# Patient Record
Sex: FEMALE | Race: WHITE | NOT HISPANIC OR LATINO
[De-identification: names, ages, dates, MRNs, and addresses within clinical notes are randomized per-mention and may not be internally consistent; named-entity substitution may affect disease eponyms.]

---

## 2017-09-03 ENCOUNTER — HEALTH MAINTENANCE LETTER (OUTPATIENT)
Age: 32
End: 2017-09-03

## 2020-08-10 ENCOUNTER — HOSPITAL ENCOUNTER (INPATIENT)
Facility: HOSPITAL | Age: 35
LOS: 8 days | Discharge: HOME | DRG: 957 | End: 2020-08-18
Attending: EMERGENCY MEDICINE | Admitting: SURGERY
Payer: COMMERCIAL

## 2020-08-10 ENCOUNTER — APPOINTMENT (OUTPATIENT)
Dept: RADIOLOGY | Facility: HOSPITAL | Age: 35
DRG: 957 | End: 2020-08-10
Payer: COMMERCIAL

## 2020-08-10 ENCOUNTER — APPOINTMENT (OUTPATIENT)
Dept: CT IMAGING | Facility: HOSPITAL | Age: 35
DRG: 957 | End: 2020-08-10
Payer: COMMERCIAL

## 2020-08-10 ENCOUNTER — HOSPITAL ENCOUNTER (OUTPATIENT)
Facility: HOSPITAL | Age: 35
Discharge: OTHER INSTITUTION | End: 2020-08-10
Payer: OTHER MISCELLANEOUS

## 2020-08-10 DIAGNOSIS — R41.82 AMS (ALTERED MENTAL STATUS): ICD-10-CM
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
## 2020-08-10 DIAGNOSIS — S27.0XXA TRAUMATIC FRACTURE OF RIBS WITH PNEUMOTHORAX, LEFT, CLOSED, INITIAL ENCOUNTER: ICD-10-CM

## 2020-08-10 DIAGNOSIS — S42.022A CLOSED DISPLACED FRACTURE OF SHAFT OF LEFT CLAVICLE, INITIAL ENCOUNTER: ICD-10-CM

## 2020-08-10 DIAGNOSIS — H53.8 BLURRED VISION, BILATERAL: ICD-10-CM

## 2020-08-10 DIAGNOSIS — S22.42XA TRAUMATIC FRACTURE OF RIBS WITH PNEUMOTHORAX, LEFT, CLOSED, INITIAL ENCOUNTER: ICD-10-CM

## 2020-08-10 DIAGNOSIS — S22.49XA MULTIPLE RIB FRACTURES: Primary | ICD-10-CM

## 2020-08-10 DIAGNOSIS — S36.112A CONTUSION OF LIVER, INITIAL ENCOUNTER: ICD-10-CM

## 2020-08-10 DIAGNOSIS — S37.019A: ICD-10-CM

## 2020-08-10 DIAGNOSIS — S09.90XA CLOSED HEAD INJURY, INITIAL ENCOUNTER: ICD-10-CM

## 2020-08-10 DIAGNOSIS — S27.329A PULMONARY CONTUSION: ICD-10-CM

## 2020-08-10 DIAGNOSIS — H53.2 DIPLOPIA: ICD-10-CM

## 2020-08-10 DIAGNOSIS — S42.101A RIGHT SCAPULA FRACTURE: ICD-10-CM

## 2020-08-10 PROBLEM — Z97.8 ENDOTRACHEAL TUBE PRESENT: Status: ACTIVE | Noted: 2020-08-10

## 2020-08-10 PROBLEM — E87.20 LACTIC ACID ACIDOSIS: Status: ACTIVE | Noted: 2020-08-10

## 2020-08-10 PROBLEM — J93.9 PNEUMOTHORAX, LEFT: Status: ACTIVE | Noted: 2020-08-10

## 2020-08-10 PROBLEM — R79.89 LFT ELEVATION: Status: ACTIVE | Noted: 2020-08-10

## 2020-08-10 PROBLEM — J96.01 ACUTE HYPOXEMIC RESPIRATORY FAILURE (CMS/HCC): Status: ACTIVE | Noted: 2020-08-10

## 2020-08-10 PROBLEM — S27.322A CONTUSION OF BOTH LUNGS: Status: ACTIVE | Noted: 2020-08-10

## 2020-08-10 LAB
ABO GROUP (TYPE) IN BLOOD: NORMAL
ALBUMIN SERPL-MCNC: 4.1 G/DL (ref 3.5–5.3)
ALP SERPL-CCNC: 86 U/L (ref 55–142)
ALT SERPL-CCNC: 720 U/L (ref 7–52)
AMPHET UR QL SCN: NORMAL
ANION GAP SERPL CALC-SCNC: 12 MMOL/L (ref 3–11)
ANTIBODY SCREEN: NORMAL
APTT PPP: 24.8 SECONDS (ref 25.1–36.5)
AST SERPL-CCNC: 1165 U/L
BACTERIA #/AREA URNS AUTO: ABNORMAL /HPF
BARBITURATES UR QL SCN: NORMAL
BASE EXCESS BLDA CALC-SCNC: -8.2 MMOL/L (ref -2–2)
BASOPHILS # BLD AUTO: 0.1 10*3/UL
BASOPHILS NFR BLD AUTO: 0 % (ref 0–2)
BENZODIAZ UR QL SCN: NORMAL
BILIRUB SERPL-MCNC: 0.45 MG/DL (ref 0.2–1.4)
BILIRUB UR QL STRIP.AUTO: NEGATIVE
BUN SERPL-MCNC: 14 MG/DL (ref 7–25)
CALCIUM ALBUM COR SERPL-MCNC: 7.9 MG/DL (ref 8.6–10.3)
CALCIUM SERPL-MCNC: 8 MG/DL (ref 8.6–10.3)
CANNABINOIDS UR QL SCN: NORMAL
CHLORIDE SERPL-SCNC: 106 MMOL/L (ref 98–107)
CLARITY UR: CLEAR
CO2 BLDA-SCNC: 16.7 MMOL/L (ref 19–24)
CO2 SERPL-SCNC: 18 MMOL/L (ref 21–32)
COCAINE UR QL SCN: NORMAL
COLOR UR: ABNORMAL
CREAT SERPL-MCNC: 0.97 MG/DL (ref 0.6–1.1)
D AG BLD QL: NORMAL
DELTA HIGH SENSITIVE TROPONIN I, LATE DRAW 2 HOUR: 5
DELTA HIGH SENSITIVITY TROPONIN I, 1 HOUR: 7.3
DEVICE (RT): ABNORMAL
EOSINOPHIL # BLD AUTO: 0.1 10*3/UL
EOSINOPHIL NFR BLD AUTO: 0 % (ref 0–3)
ERYTHROCYTE [DISTWIDTH] IN BLOOD BY AUTOMATED COUNT: 14.3 % (ref 11.5–14)
ERYTHROCYTE [DISTWIDTH] IN BLOOD BY AUTOMATED COUNT: 14.5 % (ref 11.5–14)
ETHANOL SERPL-MCNC: 32 MG/DL (ref 0–10)
FIO2: 40 %
GFR SERPL CREATININE-BSD FRML MDRD: 42 ML/MIN/1.73M*2
GLUCOSE BLDC GLUCOMTR-MCNC: 95 MG/DL (ref 70–105)
GLUCOSE SERPL-MCNC: 120 MG/DL (ref 70–105)
GLUCOSE UR STRIP.AUTO-MCNC: NEGATIVE MG/DL
HCG SERPL QL: NEGATIVE
HCO3 BLDA-SCNC: 17.9 MMOL/L (ref 23–29)
HCT VFR BLD AUTO: 35 % (ref 34–45)
HCT VFR BLD AUTO: 41.7 % (ref 34–45)
HGB BLD-MCNC: 11.9 G/DL (ref 11.5–15.5)
HGB BLD-MCNC: 13.9 G/DL (ref 11.5–15.5)
HGB UR QL STRIP.AUTO: ABNORMAL
INR BLD: 1
KETONES UR STRIP.AUTO-MCNC: NEGATIVE MG/DL
LACTATE BLDV-SCNC: 4.55 MMOL/L (ref 0.5–1.99)
LEUKOCYTE ESTERASE UR QL STRIP: NEGATIVE
LIPASE SERPL-CCNC: 27 U/L (ref 11–82)
LYMPHOCYTES # BLD AUTO: 2.1 10*3/UL
LYMPHOCYTES NFR BLD AUTO: 10 % (ref 11–47)
MCH RBC QN AUTO: 31.1 PG (ref 28–33)
MCH RBC QN AUTO: 31.7 PG (ref 28–33)
MCHC RBC AUTO-ENTMCNC: 33.3 G/DL (ref 32–36)
MCHC RBC AUTO-ENTMCNC: 34 G/DL (ref 32–36)
MCV RBC AUTO: 93.3 FL (ref 81–97)
MCV RBC AUTO: 93.5 FL (ref 81–97)
METHADONE UR QL SCN: NORMAL
METHAMPHET UR QL SCN: NORMAL
MODE (RT): ABNORMAL
MONOCYTES # BLD AUTO: 1.1 10*3/UL
MONOCYTES NFR BLD AUTO: 5 % (ref 3–11)
MV (RT): 7 L/MIN
NEUTROPHILS # BLD AUTO: 17.5 10*3/UL
NEUTROPHILS NFR BLD AUTO: 84 % (ref 41–81)
NITRITE UR QL STRIP.AUTO: NEGATIVE
OPIATES UR QL SCN: NORMAL
OXYCODONE UR QL SCN: NORMAL
PCO2 BLDA: 37.9 MMHG (ref 35–45)
PCP UR QL SCN: NORMAL
PEEP SET (RT): 8 CM/H2O
PH BLDA: 7.28 PH (ref 7.35–7.45)
PH UR STRIP.AUTO: 7 PH
PIP OBSERVED (RT): 26 CM/H2O
PLATELET # BLD AUTO: 233 10*3/UL (ref 140–350)
PLATELET # BLD AUTO: 351 10*3/UL (ref 140–350)
PMV BLD AUTO: 7.2 FL (ref 6.9–10.8)
PMV BLD AUTO: 7.2 FL (ref 6.9–10.8)
PO2 BLDA: 126 MMHG (ref 60–80)
POCT CTO2, ARTERIAL: 16.8 ML/DL (ref 15–24)
POCT HEMOGLOBIN (MEASURED), ARTERIAL: 12.3 G/DL (ref 11.5–15.5)
POCT OXYHEMOGLOBIN, ARTERIAL: 95.8 %
POTASSIUM SERPL-SCNC: 3.4 MMOL/L (ref 3.5–5.1)
PROPOXYPH UR QL SCN: NORMAL
PROT SERPL-MCNC: 6.7 G/DL (ref 6–8.3)
PROT UR STRIP.AUTO-MCNC: 100 MG/DL
PROTHROMBIN TIME: 11.2 SECONDS (ref 9.4–12.5)
RBC # BLD AUTO: 3.74 10*6/ΜL (ref 3.7–5.3)
RBC # BLD AUTO: 4.47 10*6/ΜL (ref 3.7–5.3)
RBC #/AREA URNS AUTO: ABNORMAL /HPF
RR SET (RT): 18 B/MIN
RR TOTAL (RT): 18 B/MIN
SARS-COV-2 RNA RESP QL NAA+PROBE: NEGATIVE
SECOND/CONFIRMATORY ABORH PERFORMED: NORMAL
SODIUM SERPL-SCNC: 136 MMOL/L (ref 135–145)
SP GR UR STRIP.AUTO: 1.01 (ref 1–1.03)
SPO2 (RT): 100 %
SQUAMOUS #/AREA URNS AUTO: ABNORMAL /HPF
TRICYCLICS UR QL SCN: NORMAL
TROPONIN I SERPL-MCNC: 10.1 PG/ML
TROPONIN I SERPL-MCNC: 12.4 PG/ML
TROPONIN I SERPL-MCNC: 17.4 PG/ML
UROBILINOGEN UR STRIP.AUTO-MCNC: <2 E.U./DL
VT OBSERVED (RT): 388 ML
VT SET (RT): 400 ML
WBC # BLD AUTO: 16.4 10*3/UL (ref 4.5–10.5)
WBC # BLD AUTO: 20.8 10*3/UL (ref 4.5–10.5)
WBC #/AREA URNS AUTO: ABNORMAL /HPF

## 2020-08-10 PROCEDURE — (BLANK) HC ROOM ICU SURGERY

## 2020-08-10 PROCEDURE — G0480 DRUG TEST DEF 1-7 CLASSES: HCPCS

## 2020-08-10 PROCEDURE — A0390 ADVANCED LIFE SUPPORT MILEAG: HCPCS | Mod: SI,QN

## 2020-08-10 PROCEDURE — 80053 COMPREHEN METABOLIC PANEL: CPT | Performed by: INTERNAL MEDICINE

## 2020-08-10 PROCEDURE — 5A1935Z RESPIRATORY VENTILATION, LESS THAN 24 CONSECUTIVE HOURS: ICD-10-PCS | Performed by: INTERNAL MEDICINE

## 2020-08-10 PROCEDURE — 83605 ASSAY OF LACTIC ACID: CPT

## 2020-08-10 PROCEDURE — 83690 ASSAY OF LIPASE: CPT

## 2020-08-10 PROCEDURE — 85730 THROMBOPLASTIN TIME PARTIAL: CPT

## 2020-08-10 PROCEDURE — 87635 SARS-COV-2 COVID-19 AMP PRB: CPT | Performed by: INTERNAL MEDICINE

## 2020-08-10 PROCEDURE — 6360000200 HC RX 636 W HCPCS (ALT 250 FOR IP): Performed by: EMERGENCY MEDICINE

## 2020-08-10 PROCEDURE — 85027 COMPLETE CBC AUTOMATED: CPT | Performed by: INTERNAL MEDICINE

## 2020-08-10 PROCEDURE — 2590000100 HC RX 259: Performed by: INTERNAL MEDICINE

## 2020-08-10 PROCEDURE — 83605 ASSAY OF LACTIC ACID: CPT | Performed by: INTERNAL MEDICINE

## 2020-08-10 PROCEDURE — 85610 PROTHROMBIN TIME: CPT

## 2020-08-10 PROCEDURE — 86885 COOMBS TEST INDIRECT QUAL: CPT | Performed by: EMERGENCY MEDICINE

## 2020-08-10 PROCEDURE — 99291 CRITICAL CARE FIRST HOUR: CPT | Performed by: INTERNAL MEDICINE

## 2020-08-10 PROCEDURE — 80306 DRUG TEST PRSMV INSTRMNT: CPT

## 2020-08-10 PROCEDURE — 36415 COLL VENOUS BLD VENIPUNCTURE: CPT

## 2020-08-10 PROCEDURE — 99221 1ST HOSP IP/OBS SF/LOW 40: CPT | Mod: NC | Performed by: PHYSICIAN ASSISTANT

## 2020-08-10 PROCEDURE — 2580000300 HC RX 258

## 2020-08-10 PROCEDURE — 84484 ASSAY OF TROPONIN QUANT: CPT

## 2020-08-10 PROCEDURE — 2500000200 HC RX 250 WO HCPCS: Performed by: EMERGENCY MEDICINE

## 2020-08-10 PROCEDURE — 71045 X-RAY EXAM CHEST 1 VIEW: CPT

## 2020-08-10 PROCEDURE — 94002 VENT MGMT INPAT INIT DAY: CPT

## 2020-08-10 PROCEDURE — 99291 CRITICAL CARE FIRST HOUR: CPT | Performed by: EMERGENCY MEDICINE

## 2020-08-10 PROCEDURE — 71260 CT THORAX DX C+: CPT | Mod: MG

## 2020-08-10 PROCEDURE — 6360000200 HC RX 636 W HCPCS (ALT 250 FOR IP)

## 2020-08-10 PROCEDURE — 6370000100 HC RX 637 (ALT 250 FOR IP): Performed by: NURSE PRACTITIONER

## 2020-08-10 PROCEDURE — (BLANK) HC ROOM ICU TRAUMA

## 2020-08-10 PROCEDURE — 96365 THER/PROPH/DIAG IV INF INIT: CPT

## 2020-08-10 PROCEDURE — 70450 CT HEAD/BRAIN W/O DYE: CPT | Mod: MG

## 2020-08-10 PROCEDURE — 84703 CHORIONIC GONADOTROPIN ASSAY: CPT

## 2020-08-10 PROCEDURE — 2580000300 HC RX 258: Performed by: NURSE PRACTITIONER

## 2020-08-10 PROCEDURE — 6360000200 HC RX 636 W HCPCS (ALT 250 FOR IP): Performed by: NURSE PRACTITIONER

## 2020-08-10 PROCEDURE — G0390 TRAUMA RESPONS W/HOSP CRITI: HCPCS

## 2020-08-10 PROCEDURE — 82947 ASSAY GLUCOSE BLOOD QUANT: CPT | Mod: QW

## 2020-08-10 PROCEDURE — 96375 TX/PRO/DX INJ NEW DRUG ADDON: CPT

## 2020-08-10 PROCEDURE — 80053 COMPREHEN METABOLIC PANEL: CPT

## 2020-08-10 PROCEDURE — 72125 CT NECK SPINE W/O DYE: CPT | Mod: MG

## 2020-08-10 PROCEDURE — 6360000200 HC RX 636 W HCPCS (ALT 250 FOR IP): Performed by: INTERNAL MEDICINE

## 2020-08-10 PROCEDURE — 82805 BLOOD GASES W/O2 SATURATION: CPT

## 2020-08-10 PROCEDURE — 81001 URINALYSIS AUTO W/SCOPE: CPT

## 2020-08-10 PROCEDURE — 73030 X-RAY EXAM OF SHOULDER: CPT | Mod: RT

## 2020-08-10 PROCEDURE — 2550000100 HC RX 255: Mod: JW | Performed by: EMERGENCY MEDICINE

## 2020-08-10 PROCEDURE — C9803 HOPD COVID-19 SPEC COLLECT: HCPCS | Performed by: INTERNAL MEDICINE

## 2020-08-10 PROCEDURE — 36600 WITHDRAWAL OF ARTERIAL BLOOD: CPT

## 2020-08-10 PROCEDURE — 85610 PROTHROMBIN TIME: CPT | Performed by: INTERNAL MEDICINE

## 2020-08-10 PROCEDURE — 99221 1ST HOSP IP/OBS SF/LOW 40: CPT | Performed by: SURGERY

## 2020-08-10 PROCEDURE — 85025 COMPLETE CBC W/AUTO DIFF WBC: CPT

## 2020-08-10 PROCEDURE — A0427 ALS1-EMERGENCY: HCPCS | Mod: SI,QN

## 2020-08-10 PROCEDURE — 80320 DRUG SCREEN QUANTALCOHOLS: CPT

## 2020-08-10 PROCEDURE — 99291 CRITICAL CARE FIRST HOUR: CPT

## 2020-08-10 PROCEDURE — 6370000100 HC RX 637 (ALT 250 FOR IP): Performed by: INTERNAL MEDICINE

## 2020-08-10 RX ORDER — LIDOCAINE 560 MG/1
1 PATCH PERCUTANEOUS; TOPICAL; TRANSDERMAL DAILY
Status: DISCONTINUED | OUTPATIENT
Start: 2020-08-10 | End: 2020-08-18 | Stop reason: HOSPADM

## 2020-08-10 RX ORDER — SODIUM CHLORIDE, SODIUM LACTATE, POTASSIUM CHLORIDE, CALCIUM CHLORIDE 600; 310; 30; 20 MG/100ML; MG/100ML; MG/100ML; MG/100ML
200 INJECTION, SOLUTION INTRAVENOUS CONTINUOUS
Status: DISCONTINUED | OUTPATIENT
Start: 2020-08-10 | End: 2020-08-11

## 2020-08-10 RX ORDER — DOCUSATE SODIUM 50 MG/5ML
100 LIQUID ORAL DAILY
Status: DISCONTINUED | OUTPATIENT
Start: 2020-08-10 | End: 2020-08-11

## 2020-08-10 RX ORDER — POTASSIUM CHLORIDE 7.45 MG/ML
10 INJECTION INTRAVENOUS ONCE
Status: COMPLETED | OUTPATIENT
Start: 2020-08-10 | End: 2020-08-11

## 2020-08-10 RX ORDER — POTASSIUM CHLORIDE 7.45 MG/ML
10 INJECTION INTRAVENOUS ONCE
Status: DISCONTINUED | OUTPATIENT
Start: 2020-08-11 | End: 2020-08-11

## 2020-08-10 RX ORDER — SODIUM CHLORIDE 9 MG/ML
25-50 INJECTION, SOLUTION INTRAVENOUS AS NEEDED
Status: DISCONTINUED | OUTPATIENT
Start: 2020-08-10 | End: 2020-08-18 | Stop reason: HOSPADM

## 2020-08-10 RX ORDER — FENTANYL CITRATE/PF 50 MCG/ML
100 PLASTIC BAG, INJECTION (ML) INTRAVENOUS
Status: DISCONTINUED | OUTPATIENT
Start: 2020-08-10 | End: 2020-08-11

## 2020-08-10 RX ORDER — CYCLOBENZAPRINE HCL 10 MG
10 TABLET ORAL 3 TIMES DAILY PRN
Status: DISCONTINUED | OUTPATIENT
Start: 2020-08-10 | End: 2020-08-11

## 2020-08-10 RX ORDER — ENOXAPARIN SODIUM 100 MG/ML
40 INJECTION SUBCUTANEOUS
Status: DISCONTINUED | OUTPATIENT
Start: 2020-08-10 | End: 2020-08-18 | Stop reason: HOSPADM

## 2020-08-10 RX ORDER — PROPOFOL 10 MG/ML
INJECTION, EMULSION INTRAVENOUS
Status: COMPLETED
Start: 2020-08-10 | End: 2020-08-10

## 2020-08-10 RX ORDER — HYDROMORPHONE HYDROCHLORIDE 1 MG/ML
.4-.6 INJECTION, SOLUTION INTRAMUSCULAR; INTRAVENOUS; SUBCUTANEOUS
Status: DISCONTINUED | OUTPATIENT
Start: 2020-08-10 | End: 2020-08-11

## 2020-08-10 RX ORDER — ONDANSETRON 4 MG/1
4 TABLET, FILM COATED ORAL EVERY 6 HOURS PRN
Status: DISCONTINUED | OUTPATIENT
Start: 2020-08-10 | End: 2020-08-18 | Stop reason: HOSPADM

## 2020-08-10 RX ORDER — POTASSIUM CHLORIDE 7.45 MG/ML
10 INJECTION INTRAVENOUS ONCE
Status: COMPLETED | OUTPATIENT
Start: 2020-08-11 | End: 2020-08-11

## 2020-08-10 RX ORDER — KETAMINE HYDROCHLORIDE 10 MG/ML
70 INJECTION, SOLUTION INTRAMUSCULAR; INTRAVENOUS ONCE
Status: DISCONTINUED | OUTPATIENT
Start: 2020-08-10 | End: 2020-08-10 | Stop reason: SDUPTHER

## 2020-08-10 RX ORDER — SODIUM CHLORIDE, SODIUM LACTATE, POTASSIUM CHLORIDE, AND CALCIUM CHLORIDE .6; .31; .03; .02 G/100ML; G/100ML; G/100ML; G/100ML
1000 INJECTION, SOLUTION INTRAVENOUS ONCE
Status: COMPLETED | OUTPATIENT
Start: 2020-08-10 | End: 2020-08-10

## 2020-08-10 RX ORDER — ACETAMINOPHEN 325 MG/1
650 TABLET ORAL EVERY 6 HOURS SCHEDULED
Status: DISCONTINUED | OUTPATIENT
Start: 2020-08-10 | End: 2020-08-10

## 2020-08-10 RX ORDER — DOCUSATE SODIUM 100 MG/1
100 CAPSULE, LIQUID FILLED ORAL DAILY
Status: DISCONTINUED | OUTPATIENT
Start: 2020-08-10 | End: 2020-08-10

## 2020-08-10 RX ORDER — SODIUM CHLORIDE 0.9 % (FLUSH) 0.9 %
3 SYRINGE (ML) INJECTION AS NEEDED
Status: DISCONTINUED | OUTPATIENT
Start: 2020-08-10 | End: 2020-08-18 | Stop reason: HOSPADM

## 2020-08-10 RX ORDER — ACETAMINOPHEN 650 MG/20.3ML
650 LIQUID ORAL EVERY 6 HOURS SCHEDULED
Status: DISCONTINUED | OUTPATIENT
Start: 2020-08-10 | End: 2020-08-11

## 2020-08-10 RX ORDER — KETAMINE HCL IN 0.9 % NACL 50 MG/5 ML
70 SYRINGE (ML) INTRAVENOUS ONCE
Status: COMPLETED | OUTPATIENT
Start: 2020-08-10 | End: 2020-08-10

## 2020-08-10 RX ORDER — SODIUM CHLORIDE AND POTASSIUM CHLORIDE 150; 900 MG/100ML; MG/100ML
100 INJECTION, SOLUTION INTRAVENOUS CONTINUOUS
Status: DISCONTINUED | OUTPATIENT
Start: 2020-08-10 | End: 2020-08-10

## 2020-08-10 RX ORDER — IOPAMIDOL 755 MG/ML
120 INJECTION, SOLUTION INTRAVASCULAR ONCE
Status: COMPLETED | OUTPATIENT
Start: 2020-08-10 | End: 2020-08-10

## 2020-08-10 RX ORDER — ONDANSETRON HYDROCHLORIDE 2 MG/ML
4 INJECTION, SOLUTION INTRAVENOUS EVERY 6 HOURS PRN
Status: DISCONTINUED | OUTPATIENT
Start: 2020-08-10 | End: 2020-08-18 | Stop reason: HOSPADM

## 2020-08-10 RX ORDER — PROPOFOL 10 MG/ML
5-50 INJECTION, EMULSION INTRAVENOUS
Status: DISCONTINUED | OUTPATIENT
Start: 2020-08-10 | End: 2020-08-11

## 2020-08-10 RX ORDER — POTASSIUM CHLORIDE 7.45 MG/ML
10 INJECTION INTRAVENOUS ONCE
Status: COMPLETED | OUTPATIENT
Start: 2020-08-10 | End: 2020-08-10

## 2020-08-10 RX ADMIN — PROPOFOL 50 MCG/KG/MIN: 10 INJECTION, EMULSION INTRAVENOUS at 21:50

## 2020-08-10 RX ADMIN — FENTANYL CITRATE 100 MCG: 50 INJECTION, SOLUTION INTRAMUSCULAR; INTRAVENOUS at 17:46

## 2020-08-10 RX ADMIN — SODIUM CHLORIDE, POTASSIUM CHLORIDE, SODIUM LACTATE AND CALCIUM CHLORIDE 1000 ML: 600; 310; 30; 20 INJECTION, SOLUTION INTRAVENOUS at 17:45

## 2020-08-10 RX ADMIN — PROPOFOL 20 MCG/KG/MIN: 10 INJECTION, EMULSION INTRAVENOUS at 18:30

## 2020-08-10 RX ADMIN — FENTANYL CITRATE 100 MCG: 50 INJECTION, SOLUTION INTRAMUSCULAR; INTRAVENOUS at 19:17

## 2020-08-10 RX ADMIN — FENTANYL CITRATE 100 MCG: 50 INJECTION, SOLUTION INTRAMUSCULAR; INTRAVENOUS at 21:06

## 2020-08-10 RX ADMIN — ONDANSETRON 4 MG: 2 INJECTION INTRAMUSCULAR; INTRAVENOUS at 22:23

## 2020-08-10 RX ADMIN — ENOXAPARIN SODIUM 40 MG: 40 INJECTION SUBCUTANEOUS at 21:53

## 2020-08-10 RX ADMIN — LIDOCAINE 1 PATCH: 560 PATCH PERCUTANEOUS; TOPICAL; TRANSDERMAL at 21:06

## 2020-08-10 RX ADMIN — POTASSIUM CHLORIDE AND SODIUM CHLORIDE 100 ML/HR: 900; 150 INJECTION, SOLUTION INTRAVENOUS at 20:15

## 2020-08-10 RX ADMIN — POTASSIUM CHLORIDE 10 MEQ: 7.46 INJECTION, SOLUTION INTRAVENOUS at 23:01

## 2020-08-10 RX ADMIN — HYDROMORPHONE HYDROCHLORIDE 0.5 MG: 1 INJECTION, SOLUTION INTRAMUSCULAR; INTRAVENOUS; SUBCUTANEOUS at 22:23

## 2020-08-10 RX ADMIN — Medication 70 MG: at 17:47

## 2020-08-10 RX ADMIN — ACETAMINOPHEN 650 MG: 650 LIQUID ORAL at 21:08

## 2020-08-10 RX ADMIN — IOPAMIDOL 120 ML: 755 INJECTION, SOLUTION INTRAVENOUS at 18:25

## 2020-08-10 RX ADMIN — DOCUSATE SODIUM 100 MG: 50 LIQUID ORAL at 21:08

## 2020-08-10 RX ADMIN — SODIUM CHLORIDE, POTASSIUM CHLORIDE, SODIUM LACTATE AND CALCIUM CHLORIDE 200 ML/HR: 600; 310; 30; 20 INJECTION, SOLUTION INTRAVENOUS at 21:49

## 2020-08-10 RX ADMIN — CYCLOBENZAPRINE 10 MG: 10 TABLET, FILM COATED ORAL at 21:53

## 2020-08-10 RX ADMIN — PROPOFOL 50 MCG/KG/MIN: 10 INJECTION, EMULSION INTRAVENOUS at 22:24

## 2020-08-10 RX ADMIN — POTASSIUM BICARBONATE 20 MEQ: 782 TABLET, EFFERVESCENT ORAL at 21:53
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
## 2020-08-11 ENCOUNTER — APPOINTMENT (OUTPATIENT)
Dept: RADIOLOGY | Facility: HOSPITAL | Age: 35
DRG: 957 | End: 2020-08-11
Payer: COMMERCIAL

## 2020-08-11 PROBLEM — S22.43XA MULTIPLE FRACTURES OF RIBS, BILATERAL, INITIAL ENCOUNTER FOR CLOSED FRACTURE: Status: ACTIVE | Noted: 2020-08-11

## 2020-08-11 PROBLEM — S37.019A RENAL CONTUSION: Status: ACTIVE | Noted: 2020-08-11

## 2020-08-11 PROBLEM — D72.829 LEUKOCYTOSIS: Status: ACTIVE | Noted: 2020-08-11

## 2020-08-11 PROBLEM — S42.101A RIGHT SCAPULA FRACTURE: Status: ACTIVE | Noted: 2020-08-11

## 2020-08-11 PROBLEM — S42.002A CLOSED FRACTURE OF LEFT CLAVICLE: Status: ACTIVE | Noted: 2020-08-11

## 2020-08-11 PROBLEM — R73.9 HYPERGLYCEMIA: Status: ACTIVE | Noted: 2020-08-11

## 2020-08-11 PROBLEM — E83.51 HYPOCALCEMIA: Status: ACTIVE | Noted: 2020-08-11

## 2020-08-11 PROBLEM — S00.03XA SCALP HEMATOMA: Status: ACTIVE | Noted: 2020-08-11

## 2020-08-11 LAB
ALBUMIN SERPL-MCNC: 3.1 G/DL (ref 3.5–5.3)
ALBUMIN SERPL-MCNC: 3.4 G/DL (ref 3.5–5.3)
ALP SERPL-CCNC: 60 U/L (ref 55–142)
ALP SERPL-CCNC: 66 U/L (ref 55–142)
ALT SERPL-CCNC: 453 U/L (ref 7–52)
ALT SERPL-CCNC: 528 U/L (ref 7–52)
ANION GAP SERPL CALC-SCNC: 10 MMOL/L (ref 3–11)
ANION GAP SERPL CALC-SCNC: 11 MMOL/L (ref 3–11)
AST SERPL-CCNC: 656 U/L
AST SERPL-CCNC: 902 U/L
BASOPHILS # BLD AUTO: 0.1 10*3/UL
BASOPHILS NFR BLD AUTO: 0 % (ref 0–2)
BILIRUB SERPL-MCNC: 0.35 MG/DL (ref 0.2–1.4)
BILIRUB SERPL-MCNC: 0.41 MG/DL (ref 0.2–1.4)
BUN SERPL-MCNC: 13 MG/DL (ref 7–25)
BUN SERPL-MCNC: 14 MG/DL (ref 7–25)
CALCIUM ALBUM COR SERPL-MCNC: 8.1 MG/DL (ref 8.6–10.3)
CALCIUM ALBUM COR SERPL-MCNC: 8.2 MG/DL (ref 8.6–10.3)
CALCIUM SERPL-MCNC: 7.4 MG/DL (ref 8.6–10.3)
CALCIUM SERPL-MCNC: 7.7 MG/DL (ref 8.6–10.3)
CHLORIDE SERPL-SCNC: 105 MMOL/L (ref 98–107)
CHLORIDE SERPL-SCNC: 106 MMOL/L (ref 98–107)
CO2 SERPL-SCNC: 21 MMOL/L (ref 21–32)
CO2 SERPL-SCNC: 22 MMOL/L (ref 21–32)
CREAT SERPL-MCNC: 0.79 MG/DL (ref 0.6–1.1)
CREAT SERPL-MCNC: 0.93 MG/DL (ref 0.6–1.1)
EOSINOPHIL # BLD AUTO: 0 10*3/UL
EOSINOPHIL NFR BLD AUTO: 0 % (ref 0–3)
ERYTHROCYTE [DISTWIDTH] IN BLOOD BY AUTOMATED COUNT: 13.9 % (ref 11.5–14)
GFR SERPL CREATININE-BSD FRML MDRD: 44 ML/MIN/1.73M*2
GFR SERPL CREATININE-BSD FRML MDRD: 53 ML/MIN/1.73M*2
GLUCOSE BLDC GLUCOMTR-MCNC: 109 MG/DL (ref 70–105)
GLUCOSE BLDC GLUCOMTR-MCNC: 115 MG/DL (ref 70–105)
GLUCOSE BLDC GLUCOMTR-MCNC: 124 MG/DL (ref 70–105)
GLUCOSE BLDC GLUCOMTR-MCNC: 136 MG/DL (ref 70–105)
GLUCOSE SERPL-MCNC: 120 MG/DL (ref 70–105)
GLUCOSE SERPL-MCNC: 126 MG/DL (ref 70–105)
HCT VFR BLD AUTO: 31.3 % (ref 34–45)
HGB BLD-MCNC: 10.7 G/DL (ref 11.5–15.5)
INR BLD: 1.1
LACTATE SERPL-SCNC: 2.7 MMOL/L (ref 0.5–2.2)
LYMPHOCYTES # BLD AUTO: 0.6 10*3/UL
LYMPHOCYTES NFR BLD AUTO: 4 % (ref 11–47)
MCH RBC QN AUTO: 31.5 PG (ref 28–33)
MCHC RBC AUTO-ENTMCNC: 34.2 G/DL (ref 32–36)
MCV RBC AUTO: 92.1 FL (ref 81–97)
MONOCYTES # BLD AUTO: 1.3 10*3/UL
MONOCYTES NFR BLD AUTO: 9 % (ref 3–11)
NEUTROPHILS # BLD AUTO: 12.8 10*3/UL
NEUTROPHILS NFR BLD AUTO: 87 % (ref 41–81)
PLATELET # BLD AUTO: 227 10*3/UL (ref 140–350)
PMV BLD AUTO: 7.1 FL (ref 6.9–10.8)
POTASSIUM SERPL-SCNC: 4.1 MMOL/L (ref 3.5–5.1)
POTASSIUM SERPL-SCNC: 4.5 MMOL/L (ref 3.5–5.1)
PROT SERPL-MCNC: 4.9 G/DL (ref 6–8.3)
PROT SERPL-MCNC: 5.4 G/DL (ref 6–8.3)
PROTHROMBIN TIME: 12.3 SECONDS (ref 9.4–12.5)
RBC # BLD AUTO: 3.4 10*6/ΜL (ref 3.7–5.3)
SODIUM SERPL-SCNC: 136 MMOL/L (ref 135–145)
SODIUM SERPL-SCNC: 139 MMOL/L (ref 135–145)
WBC # BLD AUTO: 14.8 10*3/UL (ref 4.5–10.5)

## 2020-08-11 PROCEDURE — 2580000300 HC RX 258

## 2020-08-11 PROCEDURE — 1110000100 HC ROOM PRIVATE W TELE

## 2020-08-11 PROCEDURE — 97162 PT EVAL MOD COMPLEX 30 MIN: CPT | Mod: GP

## 2020-08-11 PROCEDURE — 82947 ASSAY GLUCOSE BLOOD QUANT: CPT | Mod: QW

## 2020-08-11 PROCEDURE — 99233 SBSQ HOSP IP/OBS HIGH 50: CPT | Mod: GC | Performed by: STUDENT IN AN ORGANIZED HEALTH CARE EDUCATION/TRAINING PROGRAM

## 2020-08-11 PROCEDURE — 94799 UNLISTED PULMONARY SVC/PX: CPT

## 2020-08-11 PROCEDURE — 97530 THERAPEUTIC ACTIVITIES: CPT | Mod: GO | Performed by: OCCUPATIONAL THERAPIST

## 2020-08-11 PROCEDURE — 94003 VENT MGMT INPAT SUBQ DAY: CPT

## 2020-08-11 PROCEDURE — 6360000200 HC RX 636 W HCPCS (ALT 250 FOR IP): Performed by: EMERGENCY MEDICINE

## 2020-08-11 PROCEDURE — 6370000100 HC RX 637 (ALT 250 FOR IP): Performed by: NURSE PRACTITIONER

## 2020-08-11 PROCEDURE — 71045 X-RAY EXAM CHEST 1 VIEW: CPT

## 2020-08-11 PROCEDURE — 6360000200 HC RX 636 W HCPCS (ALT 250 FOR IP): Performed by: NURSE PRACTITIONER

## 2020-08-11 PROCEDURE — 2590000100 HC RX 259: Performed by: NURSE PRACTITIONER

## 2020-08-11 PROCEDURE — 80053 COMPREHEN METABOLIC PANEL: CPT | Performed by: NURSE PRACTITIONER

## 2020-08-11 PROCEDURE — 97166 OT EVAL MOD COMPLEX 45 MIN: CPT | Mod: GO | Performed by: OCCUPATIONAL THERAPIST

## 2020-08-11 PROCEDURE — 99222 1ST HOSP IP/OBS MODERATE 55: CPT | Mod: 57 | Performed by: PHYSICIAN ASSISTANT

## 2020-08-11 PROCEDURE — 6360000200 HC RX 636 W HCPCS (ALT 250 FOR IP)

## 2020-08-11 PROCEDURE — 85025 COMPLETE CBC W/AUTO DIFF WBC: CPT | Performed by: NURSE PRACTITIONER

## 2020-08-11 PROCEDURE — 2590000100 HC RX 259: Performed by: INTERNAL MEDICINE

## 2020-08-11 PROCEDURE — 97530 THERAPEUTIC ACTIVITIES: CPT | Mod: GP

## 2020-08-11 PROCEDURE — 6370000100 HC RX 637 (ALT 250 FOR IP): Performed by: INTERNAL MEDICINE

## 2020-08-11 PROCEDURE — 99232 SBSQ HOSP IP/OBS MODERATE 35: CPT | Performed by: NURSE PRACTITIONER

## 2020-08-11 PROCEDURE — 6360000200 HC RX 636 W HCPCS (ALT 250 FOR IP): Performed by: INTERNAL MEDICINE

## 2020-08-11 PROCEDURE — 36415 COLL VENOUS BLD VENIPUNCTURE: CPT | Performed by: NURSE PRACTITIONER

## 2020-08-11 PROCEDURE — 73120 X-RAY EXAM OF HAND: CPT | Mod: RT

## 2020-08-11 RX ORDER — HYDROCODONE BITARTRATE AND ACETAMINOPHEN 5; 325 MG/1; MG/1
1 TABLET ORAL EVERY 6 HOURS PRN
Status: DISCONTINUED | OUTPATIENT
Start: 2020-08-11 | End: 2020-08-11

## 2020-08-11 RX ORDER — PROPOFOL 10 MG/ML
INJECTION, EMULSION INTRAVENOUS
Status: COMPLETED
Start: 2020-08-11 | End: 2020-08-11

## 2020-08-11 RX ORDER — FENTANYL CITRATE/PF 50 MCG/ML
50 PLASTIC BAG, INJECTION (ML) INTRAVENOUS
Status: DISCONTINUED | OUTPATIENT
Start: 2020-08-11 | End: 2020-08-14

## 2020-08-11 RX ORDER — DOCUSATE SODIUM 100 MG/1
200 CAPSULE, LIQUID FILLED ORAL 2 TIMES DAILY
Status: DISCONTINUED | OUTPATIENT
Start: 2020-08-11 | End: 2020-08-11

## 2020-08-11 RX ORDER — SENNOSIDES 8.6 MG/1
8.6 TABLET ORAL 2 TIMES DAILY
Status: DISCONTINUED | OUTPATIENT
Start: 2020-08-11 | End: 2020-08-18

## 2020-08-11 RX ORDER — POLYETHYLENE GLYCOL (3350) 17 G/17G
17 POWDER, FOR SOLUTION ORAL DAILY
Status: DISCONTINUED | OUTPATIENT
Start: 2020-08-11 | End: 2020-08-18 | Stop reason: HOSPADM

## 2020-08-11 RX ORDER — GABAPENTIN 300 MG/1
300 CAPSULE ORAL 3 TIMES DAILY
Status: DISCONTINUED | OUTPATIENT
Start: 2020-08-11 | End: 2020-08-18

## 2020-08-11 RX ORDER — ACETAMINOPHEN 325 MG/1
650 TABLET ORAL EVERY 6 HOURS SCHEDULED
Status: DISCONTINUED | OUTPATIENT
Start: 2020-08-11 | End: 2020-08-18 | Stop reason: HOSPADM

## 2020-08-11 RX ORDER — OXYCODONE HYDROCHLORIDE 5 MG/1
5-10 TABLET ORAL EVERY 4 HOURS PRN
Status: DISCONTINUED | OUTPATIENT
Start: 2020-08-11 | End: 2020-08-17

## 2020-08-11 RX ADMIN — POTASSIUM CHLORIDE 10 MEQ: 7.46 INJECTION, SOLUTION INTRAVENOUS at 01:26

## 2020-08-11 RX ADMIN — LIDOCAINE 1 PATCH: 560 PATCH PERCUTANEOUS; TOPICAL; TRANSDERMAL at 08:00

## 2020-08-11 RX ADMIN — FENTANYL CITRATE 100 MCG: 50 INJECTION, SOLUTION INTRAMUSCULAR; INTRAVENOUS at 07:45

## 2020-08-11 RX ADMIN — POLYETHYLENE GLYCOL 3350 17 G: 17 POWDER, FOR SOLUTION ORAL at 14:25

## 2020-08-11 RX ADMIN — POTASSIUM CHLORIDE 10 MEQ: 7.46 INJECTION, SOLUTION INTRAVENOUS at 00:21

## 2020-08-11 RX ADMIN — GABAPENTIN 300 MG: 300 CAPSULE ORAL at 14:25

## 2020-08-11 RX ADMIN — Medication 8.6 MG: at 21:07

## 2020-08-11 RX ADMIN — Medication 8.6 MG: at 08:09

## 2020-08-11 RX ADMIN — DOCUSATE SODIUM 200 MG: 100 CAPSULE, LIQUID FILLED ORAL at 09:45

## 2020-08-11 RX ADMIN — ACETAMINOPHEN 650 MG: 325 TABLET ORAL at 17:02

## 2020-08-11 RX ADMIN — OXYCODONE HYDROCHLORIDE 10 MG: 5 TABLET ORAL at 21:24

## 2020-08-11 RX ADMIN — PROPOFOL 50 MCG/KG/MIN: 10 INJECTION, EMULSION INTRAVENOUS at 00:22

## 2020-08-11 RX ADMIN — GABAPENTIN 300 MG: 300 CAPSULE ORAL at 21:07

## 2020-08-11 RX ADMIN — GABAPENTIN 300 MG: 300 CAPSULE ORAL at 08:09

## 2020-08-11 RX ADMIN — FENTANYL CITRATE 50 MCG: 50 INJECTION, SOLUTION INTRAMUSCULAR; INTRAVENOUS at 04:54

## 2020-08-11 RX ADMIN — OXYCODONE HYDROCHLORIDE 5 MG: 5 TABLET ORAL at 10:04

## 2020-08-11 RX ADMIN — SODIUM CHLORIDE, POTASSIUM CHLORIDE, SODIUM LACTATE AND CALCIUM CHLORIDE 200 ML/HR: 600; 310; 30; 20 INJECTION, SOLUTION INTRAVENOUS at 02:52

## 2020-08-11 RX ADMIN — HYDROMORPHONE HYDROCHLORIDE 0.5 MG: 1 INJECTION, SOLUTION INTRAMUSCULAR; INTRAVENOUS; SUBCUTANEOUS at 01:27
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
## 2020-08-11 NOTE — PLAN OF CARE
Problem: Infection Control  Goal: MINIMIZE THE ACQUISITION AND TRANSMISSION OF INFECTIOUS AGENTS  Description: INTERVENTIONS:  1. Isolate patient with suspected/diagnosed communicable disease  2. Place on designated isolation precautions  3. Maintain isolation techniques  4. Perform hand hygiene before and after each patient care activity  5. [place] universal precautions  6. Wear PPE as directed for type of isolation  7. Administer antibiotic therapy as ordered  8. Clean the environment appropriately after each patient use  9. Clean patient care equipment after each patient use as it leaves the room  10. Limit number of visitors, as appropriate  Outcome: Progressing     Problem: Knowledge Deficit  Goal: Patient/family/caregiver demonstrates understanding of disease process, treatment plan, medications, and discharge instructions  Description: INTERVENTIONS:   1. Complete learning assessment and assess knowledge base  2. Provide teaching at level of understanding   3. Provide teaching via preferred learning methods  Outcome: Progressing     Problem: Potential for Compromised Skin Integrity  Goal: Skin Integrity is Maintained or Improved  Description: INTERVENTIONS:  1. Assess and monitor skin integrity  2. Collaborate with interdisciplinary team and initiate plans and interventions as needed  3. Alternate a full bath with partial baths for elderly   4. Monitor patient's hygiene practices   5. Collaborate with wound, ostomy, and continence nurse  Outcome: Progressing  Goal: Nutritional status is improving  Description: INTERVENTIONS:  1. Monitor and assess patient for malnutrition (ex- brittle hair, bruises, dry skin, pale skin and conjunctiva, muscle wasting, smooth red tongue, and disorientation)  2. Monitor patient's weight and dietary intake as ordered or per policy  3. Determine patient's food preferences and provide high-protein, high-caloric foods as appropriate  4. Assist patient with eating   5. Allow  adequate time for meals   6. Encourage patient to take dietary supplement as ordered   7. Collaborate with dietitian  8. Include patient/family/caregiver in decisions related to nutrition  Outcome: Progressing  Goal: MOBILITY IS MAINTAINED OR IMPROVED  Description: INTERVENTIONS  1. Collaborate with interdisciplinary team and initiate plan and interventions as ordered (PT/OT)  2. Encourage ambulation  3. Up to chair for meals  4. Monitor for signs of deconditioning  Outcome: Progressing     Problem: Urinary Incontinence  Goal: Perineal skin integrity is maintained or improved  Description: INTERVENTIONS:  1. Assess genitourinary system, perineal skin, labs (urinalysis), and history of incontinence to include past management, aggravating, and alleviating factors  2. Collaborate with interdisciplinary team including wound, ostomy, and continence nurse and initiate plans and interventions as needed  4. Consider urine containment device  5. Apply skin protectant   6. Develop skin care regimen  7. Provide privacy when changing patient's incontinence device to maintain their dignity  Outcome: Progressing     Problem: Neurosensory - Adult  Goal: Achieves stable or improved neurological status  Description: INTERVENTIONS  1. Assess for and report changes in neurological status  2. Initiate measures to prevent increased intracranial pressure  3. Maintain blood pressure and fluid volume within ordered parameters to optimize cerebral perfusion and minimize risk of hemorrhage  4. Monitor temperature, glucose, and sodium. Initiate appropriate interventions as ordered  Outcome: Progressing  Goal: Absence of seizures  Description: INTERVENTIONS  1. Monitor for seizure activity  2. Administer anti-seizure medications as ordered  3. Monitor neurological status  4. Assist patient in avoiding triggers, if identified  Outcome: Progressing  Goal: Remains free of injury related to seizures activity  Description: INTERVENTIONS  1.  Maintain airway, patient safety  and administer oxygen as ordered  2. Monitor patient for seizure activity, document and report duration and description of seizure to provider  3. If seizure occurs, turn patient to side and suction secretions as needed  4. Reorient patient post seizure  5. Seizure pads on all 4 side rails  6. Instruct patient/family to notify RN of any seizure activity  7. Instruct patient/family to call for assistance with activity based on assessment  Outcome: Progressing  Goal: Achieves maximal functionality and self care  Description: INTERVENTIONS  1. Monitor swallowing and airway patency with patient fatigue and changes in neurological status  2. Encourage and assist patient to increase activity and self care with guidance from PT/OT  3. Encourage visually impaired, hearing impaired and aphasic patients to use assistive/communication devices  Outcome: Progressing     Problem: Cardiovascular - Adult  Goal: Maintains optimal cardiac output and hemodynamic stability  Description: INTERVENTIONS:  1. Monitor vital signs and rhythm  2. Monitor for hypotension and other signs of decreased cardiac output  3. Administer and titrate ordered vasoactive medications to optimize hemodynamic stability  4. Monitor for fluid overload/dehydration, weight gain, shortness of breath and activity intolerance  5. Monitor arterial and/or venous puncture sites for bleeding and/or hematoma  6. Assess quality of pulses, capillary refill, edema, sensation, skin color and temperature  7. Assess for signs of decreased coronary artery perfusion - ex. angina  Outcome: Progressing  Goal: Absence of cardiac dysrhythmias or at baseline  Description: INTERVENTIONS:  1. Continuous cardiac monitoring, monitor vital signs, obtain 12 lead EKG if indicated  2. Administer antiarrhythmic and heart rate control medications as ordered  3. Initiate emergency measures for life threatening arrhythmias  4. Monitor electrolytes and administer  replacement therapy as ordered  Outcome: Progressing     Problem: Respiratory - Adult  Goal: Achieves optimal ventilation and oxygenation  Description: INTERVENTIONS:  1. Assess for changes in respiratory status  2. Assess for changes in mentation and behavior  3. Position to facilitate oxygenation and minimize respiratory effort  4. Oxygen supplementation based on oxygen saturation or ABGs  5. Assess patient's ability to cough effectively  6. Encourage broncho-pulmonary hygiene including cough, deep breathe  7. Assess the need for suctioning   8. Assess and instruct to report SOB or any respiratory difficulty  9. Respiratory Therapy support as indicated, including medications and treatment.  Outcome: Progressing  Goal: Achieves optimal ventilation and oxygenation with noninvasive CPAP/BiLEVEL support  Description: INTERVENTIONS:  1. Provide education to patient/family about rationale and expected outcomes associated with therapy  2. Position patient to facilitate optimal ventilation/oxygenation status and minimize respiratory effort  3. Position patient to reduce aspiration risk, elevate head of bed at least 35 degrees or higher, as applicable  4. Assess effectiveness of therapy on ventilation/oxygenation status based on oxygen saturation and/or arterial blood gases, as indicated  5. Assess patient for changes in respiratory and physiological status  6. Auscultate breath sounds and assess chest excursion, as indicated  7. Assess patient for changes in mentation and behavior  8. Routinely monitor equipment for proper performance and settings  9. Assess and monitor skin condition, in relationship to the respiratory interface  10. Assure equipment alarm volume is adequate for the patient's environment  11. Immediately respond to equipment alarm, to assess patient and/or cause for alarm  12. Follow universal infection control/hospital policy(ies)/standards  Outcome: Progressing     Problem: Gastrointestinal -  Adult  Goal: Minimal or absence of nausea and vomiting  Description: INTERVENTIONS:  1. Ensure adequate hydration  2. Monitor intake and output  3. Maintain NPO status until nausea and vomiting are resolved  4. Nasogastric tube to low intermittent suction as ordered  5. Administer ordered antiemetic medications as needed  6. Provide nonpharmacologic comfort measures as appropriate  7. Advance diet as ordered  8. Nutrition consult as indicated   Outcome: Progressing  Goal: Maintains or returns to baseline digestive function  Description: INTERVENTIONS:  1. Assess bowel function  2. Ensure adequate hydration  3. Administer ordered medications as needed  4. Encourage mobilization and activity  5. Nutrition consult as indicated  6. Assess hydration and nutritional status  7. Assess characteristics and frequency of stool  8. Monitor for metabolic panel imbalances  9. Assess for treatment effectiveness  Outcome: Progressing  Goal: Maintains adequate nutritional intake  Description: INTERVENTIONS:  1. Monitor percentage of each meal consumed  2. Identify factors contributing to decreased intake, treat as appropriate  3. Assist with meals as needed  4. Monitor I&O, weight and lab values  5. Obtain nutritional consult as indicated  6. Administer alternative nutrition interventions as ordered  Outcome: Progressing  Goal: Establish and maintain optimal ostomy function  Description: INTERVENTIONS:  1. Assess bowel function  2. Encourage mobilization and activity  3. Assess ostomy stoma characteristics  4. Assess skin surrounding ostomy stoma  5. Empty/Change ostomy pouch as needed  6. Provide ostomy care  7. Consult Wound Care/Ostomy Nurses as indicated  8. Nutrition consult as indicated  Outcome: Progressing  Goal: Maintains Optimal Tissue Perfusion  Description: INTERVENTIONS:  1. Monitor for increased abdominal girth, firmness or intra-abdominal pressure  2. Monitor nutritional intake, intake/output, and weight  3. Assess  for signs of dehydration  4. Assess blood pressure, heart rate, and oxygen saturation  5. Assess skin color, capillary refill and edema  6. Monitor metabolic panels, blood count panels, and coagulations panels as ordered  7. Assess bowel function  8. Assess for blood in emesis and stool  Outcome: Progressing  Goal: Nutrient intake appropriate for improving, restoring or maintaining nutritional needs  Description: INTERVENTIONS:  1. Assess nutritional status  2. Monitor oral intake, labs, and treatment plans  3. Provide appropriate diets, oral nutritional supplements, and vitamin/mineral supplements  4. Monitor, and adjust tube feedings and TPN/PPN based on assessed needs  5. Provide specific nutrition education as appropriate  Outcome: Progressing     Problem: Genitourinary - Adult  Goal: Absence of urinary retention  Description: INTERVENTIONS:  1. Assess patient’s ability to void and empty bladder.  2. Monitor intake/output and perform bladder scan if indicated.  3. Place urinary catheter per order and initiate and maintain CAUTI bundle.  4. Administer medications to alleviate retention as needed.  5. Discuss catheterization for long term situations as appropriate.    Outcome: Progressing  Goal: Urinary catheter remains patent  Description: INTERVENTIONS:  1. Assess patency of urinary catheter.  2. Irrigate catheter per order if indicated and notify provider if unable to irrigate.  3. Assess need for a larger catheter size or a 3-way catheter for continuous bladder irrigation.  Outcome: Progressing  Goal: Absence of Urinary Infection  Description: INTERVENTIONS:  1. Assess urinary status for burning, urgency, frequency, and pain  2. Assess urine for color, cloudiness, odor, and amount  3. Monitor intake/output  4. Monitor lab values  5. Obtain urinalysis as ordered  6. Assess for neurological status changes    Outcome: Progressing     Problem: Metabolic/Fluid and Electrolytes - Adult  Goal: Electrolytes maintained  within normal limits  Description: INTERVENTIONS:  1. Monitor labs and assess patient for signs and symptoms of electrolyte imbalances  2. Administer electrolyte replacement as ordered  3. Monitor response to electrolyte replacements, including repeat lab results as appropriate  4. Fluid restriction as ordered  5. Instruct patient on fluid and nutrition restrictions as appropriate  Outcome: Progressing  Goal: Maintain Optimal Renal Function and Hemodynamic Stability  Description: INTERVENTIONS:  1. Monitor labs and assess for signs and symptoms of volume excess or deficit  2. Monitor intake, output and patient weight  3. Monitor urine specific gravity, serum osmolarity and serum sodium as indicated or ordered  4. Monitor response to interventions for patient's volume status, including labs, urine output, blood pressure (other measures as available)  5. Encourage oral intake as appropriate  6. Instruct patient on fluid and nutrition restrictions as appropriate  Outcome: Progressing  Goal: Glucose maintained within prescribed range  Description: INTERVENTIONS:  1. Monitor blood glucose as ordered  2. Assess for signs and symptoms of hyperglycemia and hypoglycemia  3. Administer ordered medications to maintain glucose within target range  4. Assess barriers to adequate nutritional intake and initiate nutrition consult as needed  5. Assess baseline knowledge and provide education as indicated  6. Monitor exercise as may reduce the requirements for insulin  Outcome: Progressing     Problem: Skin/Tissue Integrity - Adult  Goal: Skin integrity remains intact  Description: INTERVENTIONS  1. Assess and document risk factors for pressure ulcer development  2. Assess and document skin integrity  3. Monitor for areas of redness and/or skin breakdown  4. Initiate pressure ulcer prevention measures as indicated  Outcome: Progressing  Goal: Incisions, wounds, or drain sites healing without S/S of infection  Description:  INTERVENTIONS  1. Assess and document risk factors for skin breakdown  2. Assess and document skin integrity  3. Assess and document dressing/incision, wound bed, drain sites and surrounding tissue  4. Implement wound care per orders  Outcome: Progressing  Goal: Oral mucous membranes remain intact  Description: INTERVENTIONS  1. Assess oral mucosa and hygiene practices  2. Implement preventative oral hygiene regimen  3. Implement oral medicated treatments as ordered  Outcome: Progressing     Problem: Hematologic - Adult  Goal: Maintains hematologic stability  Description: INTERVENTIONS  1. Assess for signs and symptoms of bleeding or hemorrhage  2. Monitor labs  3. Administer supportive blood products/factors as ordered and appropriate  4. Administer medications as ordered  5. Initiate bleeding precautions as indicated  6. Educate patient/family to report signs/symptoms of bleeding  Outcome: Progressing     Problem: Musculoskeletal - Adult  Goal: Return mobility to safest level of function  Description: INTERVENTIONS:  1. Assess patient stability and activity tolerance for standing, transferring and ambulating w/ or w/o assistive devices  2. Assist with transfers and ambulation using safe practices  3. Ensure adequate protection for wounds/incisions during mobilization  4. Obtain PT/OT and other consults as needed  5. Apply Continuous Passive Motion per order to increase flexion toward goal  6. Instruct patient/family in ordered activity level  Outcome: Progressing  Goal: Maintain proper alignment of affected body part  Description: INTERVENTIONS:  1. Support and protect limb and body alignment per provider order  2. Instruct and reinforce with patient and family use of appropriate assistive device and precautions (e.g. spinal or hip dislocation precautions)  Outcome: Progressing  Goal: Return ADL status to a safe level of function  Description: INTERVENTIONS:  1. Assess patient's ADL deficits and provide assistive  devices as needed  2. Obtain PT/OT consults as needed  3. Assist and instruct patient to increase activity and self care  Outcome: Progressing     Problem: Safety - Medical Restraint  Goal: Remains free of injury from restraints (Restraint for Interference with Medical Device)  Description: INTERVENTIONS:  1. Determine that other, less restrictive measures have been tried or would not be effective before applying the restraint  2. Evaluate the patient's condition at the time of restraint application  3. Inform patient/family regarding the reason for restraint  4. Monitor safety, psychosocial status, comfort, nutrition and hydration  5. Assess continued need for restraints  6. Identify and implement measures to help patient regain control  Outcome: Progressing
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
## 2020-08-11 NOTE — PLAN OF CARE
Problem: Knowledge Deficit  Goal: Patient/family/caregiver demonstrates understanding of disease process, treatment plan, medications, and discharge instructions  Description: INTERVENTIONS:   1. Complete learning assessment and assess knowledge base  2. Provide teaching at level of understanding   3. Provide teaching via preferred learning methods  Outcome: Progressing     Problem: Potential for Compromised Skin Integrity  Goal: Skin Integrity is Maintained or Improved  Description: INTERVENTIONS:  1. Assess and monitor skin integrity  2. Collaborate with interdisciplinary team and initiate plans and interventions as needed  3. Alternate a full bath with partial baths for elderly   4. Monitor patient's hygiene practices   5. Collaborate with wound, ostomy, and continence nurse  Outcome: Progressing  Goal: Nutritional status is improving  Description: INTERVENTIONS:  1. Monitor and assess patient for malnutrition (ex- brittle hair, bruises, dry skin, pale skin and conjunctiva, muscle wasting, smooth red tongue, and disorientation)  2. Monitor patient's weight and dietary intake as ordered or per policy  3. Determine patient's food preferences and provide high-protein, high-caloric foods as appropriate  4. Assist patient with eating   5. Allow adequate time for meals   6. Encourage patient to take dietary supplement as ordered   7. Collaborate with dietitian  8. Include patient/family/caregiver in decisions related to nutrition  Outcome: Progressing  Goal: MOBILITY IS MAINTAINED OR IMPROVED  Description: INTERVENTIONS  1. Collaborate with interdisciplinary team and initiate plan and interventions as ordered (PT/OT)  2. Encourage ambulation  3. Up to chair for meals  4. Monitor for signs of deconditioning  Outcome: Progressing     Problem: Neurosensory - Adult  Goal: Achieves stable or improved neurological status  Description: INTERVENTIONS  1. Assess for and report changes in neurological status  2. Initiate  measures to prevent increased intracranial pressure  3. Maintain blood pressure and fluid volume within ordered parameters to optimize cerebral perfusion and minimize risk of hemorrhage  4. Monitor temperature, glucose, and sodium. Initiate appropriate interventions as ordered  Outcome: Progressing  Goal: Absence of seizures  Description: INTERVENTIONS  1. Monitor for seizure activity  2. Administer anti-seizure medications as ordered  3. Monitor neurological status  4. Assist patient in avoiding triggers, if identified  Outcome: Progressing  Goal: Remains free of injury related to seizures activity  Description: INTERVENTIONS  1. Maintain airway, patient safety  and administer oxygen as ordered  2. Monitor patient for seizure activity, document and report duration and description of seizure to provider  3. If seizure occurs, turn patient to side and suction secretions as needed  4. Reorient patient post seizure  5. Seizure pads on all 4 side rails  6. Instruct patient/family to notify RN of any seizure activity  7. Instruct patient/family to call for assistance with activity based on assessment  Outcome: Progressing  Goal: Achieves maximal functionality and self care  Description: INTERVENTIONS  1. Monitor swallowing and airway patency with patient fatigue and changes in neurological status  2. Encourage and assist patient to increase activity and self care with guidance from PT/OT  3. Encourage visually impaired, hearing impaired and aphasic patients to use assistive/communication devices  Outcome: Progressing     Problem: Cardiovascular - Adult  Goal: Maintains optimal cardiac output and hemodynamic stability  Description: INTERVENTIONS:  1. Monitor vital signs and rhythm  2. Monitor for hypotension and other signs of decreased cardiac output  3. Administer and titrate ordered vasoactive medications to optimize hemodynamic stability  4. Monitor for fluid overload/dehydration, weight gain, shortness of breath and  activity intolerance  5. Monitor arterial and/or venous puncture sites for bleeding and/or hematoma  6. Assess quality of pulses, capillary refill, edema, sensation, skin color and temperature  7. Assess for signs of decreased coronary artery perfusion - ex. angina  Outcome: Progressing  Goal: Absence of cardiac dysrhythmias or at baseline  Description: INTERVENTIONS:  1. Continuous cardiac monitoring, monitor vital signs, obtain 12 lead EKG if indicated  2. Administer antiarrhythmic and heart rate control medications as ordered  3. Initiate emergency measures for life threatening arrhythmias  4. Monitor electrolytes and administer replacement therapy as ordered  Outcome: Progressing     Problem: Respiratory - Adult  Goal: Achieves optimal ventilation and oxygenation  Description: INTERVENTIONS:  1. Assess for changes in respiratory status  2. Assess for changes in mentation and behavior  3. Position to facilitate oxygenation and minimize respiratory effort  4. Oxygen supplementation based on oxygen saturation or ABGs  5. Assess patient's ability to cough effectively  6. Encourage broncho-pulmonary hygiene including cough, deep breathe  7. Assess the need for suctioning   8. Assess and instruct to report SOB or any respiratory difficulty  9. Respiratory Therapy support as indicated, including medications and treatment.  Outcome: Progressing     Problem: Gastrointestinal - Adult  Goal: Minimal or absence of nausea and vomiting  Description: INTERVENTIONS:  1. Ensure adequate hydration  2. Monitor intake and output  3. Maintain NPO status until nausea and vomiting are resolved  4. Nasogastric tube to low intermittent suction as ordered  5. Administer ordered antiemetic medications as needed  6. Provide nonpharmacologic comfort measures as appropriate  7. Advance diet as ordered  8. Nutrition consult as indicated   Outcome: Progressing  Goal: Maintains or returns to baseline digestive function  Description:  INTERVENTIONS:  1. Assess bowel function  2. Ensure adequate hydration  3. Administer ordered medications as needed  4. Encourage mobilization and activity  5. Nutrition consult as indicated  6. Assess hydration and nutritional status  7. Assess characteristics and frequency of stool  8. Monitor for metabolic panel imbalances  9. Assess for treatment effectiveness  Outcome: Progressing  Goal: Maintains adequate nutritional intake  Description: INTERVENTIONS:  1. Monitor percentage of each meal consumed  2. Identify factors contributing to decreased intake, treat as appropriate  3. Assist with meals as needed  4. Monitor I&O, weight and lab values  5. Obtain nutritional consult as indicated  6. Administer alternative nutrition interventions as ordered  Outcome: Progressing  Goal: Establish and maintain optimal ostomy function  Description: INTERVENTIONS:  1. Assess bowel function  2. Encourage mobilization and activity  3. Assess ostomy stoma characteristics  4. Assess skin surrounding ostomy stoma  5. Empty/Change ostomy pouch as needed  6. Provide ostomy care  7. Consult Wound Care/Ostomy Nurses as indicated  8. Nutrition consult as indicated  Outcome: Progressing  Goal: Maintains Optimal Tissue Perfusion  Description: INTERVENTIONS:  1. Monitor for increased abdominal girth, firmness or intra-abdominal pressure  2. Monitor nutritional intake, intake/output, and weight  3. Assess for signs of dehydration  4. Assess blood pressure, heart rate, and oxygen saturation  5. Assess skin color, capillary refill and edema  6. Monitor metabolic panels, blood count panels, and coagulations panels as ordered  7. Assess bowel function  8. Assess for blood in emesis and stool  Outcome: Progressing  Goal: Nutrient intake appropriate for improving, restoring or maintaining nutritional needs  Description: INTERVENTIONS:  1. Assess nutritional status  2. Monitor oral intake, labs, and treatment plans  3. Provide appropriate diets,  oral nutritional supplements, and vitamin/mineral supplements  4. Monitor, and adjust tube feedings and TPN/PPN based on assessed needs  5. Provide specific nutrition education as appropriate  Outcome: Progressing     Problem: Genitourinary - Adult  Goal: Absence of urinary retention  Description: INTERVENTIONS:  1. Assess patient’s ability to void and empty bladder.  2. Monitor intake/output and perform bladder scan if indicated.  3. Place urinary catheter per order and initiate and maintain CAUTI bundle.  4. Administer medications to alleviate retention as needed.  5. Discuss catheterization for long term situations as appropriate.    Outcome: Progressing  Goal: Urinary catheter remains patent  Description: INTERVENTIONS:  1. Assess patency of urinary catheter.  2. Irrigate catheter per order if indicated and notify provider if unable to irrigate.  3. Assess need for a larger catheter size or a 3-way catheter for continuous bladder irrigation.  Outcome: Progressing  Goal: Absence of Urinary Infection  Description: INTERVENTIONS:  1. Assess urinary status for burning, urgency, frequency, and pain  2. Assess urine for color, cloudiness, odor, and amount  3. Monitor intake/output  4. Monitor lab values  5. Obtain urinalysis as ordered  6. Assess for neurological status changes    Outcome: Progressing     Problem: Metabolic/Fluid and Electrolytes - Adult  Goal: Electrolytes maintained within normal limits  Description: INTERVENTIONS:  1. Monitor labs and assess patient for signs and symptoms of electrolyte imbalances  2. Administer electrolyte replacement as ordered  3. Monitor response to electrolyte replacements, including repeat lab results as appropriate  4. Fluid restriction as ordered  5. Instruct patient on fluid and nutrition restrictions as appropriate  Outcome: Progressing  Goal: Maintain Optimal Renal Function and Hemodynamic Stability  Description: INTERVENTIONS:  1. Monitor labs and assess for signs and  symptoms of volume excess or deficit  2. Monitor intake, output and patient weight  3. Monitor urine specific gravity, serum osmolarity and serum sodium as indicated or ordered  4. Monitor response to interventions for patient's volume status, including labs, urine output, blood pressure (other measures as available)  5. Encourage oral intake as appropriate  6. Instruct patient on fluid and nutrition restrictions as appropriate  Outcome: Progressing  Goal: Glucose maintained within prescribed range  Description: INTERVENTIONS:  1. Monitor blood glucose as ordered  2. Assess for signs and symptoms of hyperglycemia and hypoglycemia  3. Administer ordered medications to maintain glucose within target range  4. Assess barriers to adequate nutritional intake and initiate nutrition consult as needed  5. Assess baseline knowledge and provide education as indicated  6. Monitor exercise as may reduce the requirements for insulin  Outcome: Progressing     Problem: Skin/Tissue Integrity - Adult  Goal: Skin integrity remains intact  Description: INTERVENTIONS  1. Assess and document risk factors for pressure ulcer development  2. Assess and document skin integrity  3. Monitor for areas of redness and/or skin breakdown  4. Initiate pressure ulcer prevention measures as indicated  Outcome: Progressing  Goal: Incisions, wounds, or drain sites healing without S/S of infection  Description: INTERVENTIONS  1. Assess and document risk factors for skin breakdown  2. Assess and document skin integrity  3. Assess and document dressing/incision, wound bed, drain sites and surrounding tissue  4. Implement wound care per orders  Outcome: Progressing  Goal: Oral mucous membranes remain intact  Description: INTERVENTIONS  1. Assess oral mucosa and hygiene practices  2. Implement preventative oral hygiene regimen  3. Implement oral medicated treatments as ordered  Outcome: Progressing     Problem: Hematologic - Adult  Goal: Maintains  hematologic stability  Description: INTERVENTIONS  1. Assess for signs and symptoms of bleeding or hemorrhage  2. Monitor labs  3. Administer supportive blood products/factors as ordered and appropriate  4. Administer medications as ordered  5. Initiate bleeding precautions as indicated  6. Educate patient/family to report signs/symptoms of bleeding  Outcome: Progressing     Problem: Musculoskeletal - Adult  Goal: Return mobility to safest level of function  Description: INTERVENTIONS:  1. Assess patient stability and activity tolerance for standing, transferring and ambulating w/ or w/o assistive devices  2. Assist with transfers and ambulation using safe practices  3. Ensure adequate protection for wounds/incisions during mobilization  4. Obtain PT/OT and other consults as needed  5. Apply Continuous Passive Motion per order to increase flexion toward goal  6. Instruct patient/family in ordered activity level  Outcome: Progressing  Goal: Maintain proper alignment of affected body part  Description: INTERVENTIONS:  1. Support and protect limb and body alignment per provider order  2. Instruct and reinforce with patient and family use of appropriate assistive device and precautions (e.g. spinal or hip dislocation precautions)  Outcome: Progressing  Goal: Return ADL status to a safe level of function  Description: INTERVENTIONS:  1. Assess patient's ADL deficits and provide assistive devices as needed  2. Obtain PT/OT consults as needed  3. Assist and instruct patient to increase activity and self care  Outcome: Progressing     Problem: Safety Adult - Fall  Goal: Free from fall injury  Description: INTERVENTIONS:    Inpatient - Please reference Cares/Safety Flowsheet under [place] Fall Risk for interventions.  Pediatrics - Please reference Peds Daily Cares/Safety Flowsheet under [place] Pediatric Fall Assessment Fall Bundle for interventions  LD/OB - Please reference OB Shift Screening Flowsheet under OB Fall Risk for  interventions.  Outcome: Progressing

## 2020-08-11 NOTE — PLAN OF CARE
Problem: Safety - Medical Restraint  Goal: Remains free of injury from restraints (Restraint for Interference with Medical Device)  Description: INTERVENTIONS:  1. Determine that other, less restrictive measures have been tried or would not be effective before applying the restraint  2. Evaluate the patient's condition at the time of restraint application  3. Inform patient/family regarding the reason for restraint  4. Monitor safety, psychosocial status, comfort, nutrition and hydration  5. Assess continued need for restraints  6. Identify and implement measures to help patient regain control  Outcome: Progressing  Flowsheets (Taken 8/11/2020 0422)  Remains free of injury from restraints (restraint for interference with medical device):   Determine that other, less restrictive measures have been tried or would not be effective before applying the restraint   Evaluate the patient's condition at the time of restraint application   Monitor safety, psychosocial status, comfort, nutrition and hydration   Inform patient/family regarding the reason for restraint   Identify and implement measures to help patient regain control   Assess continued need for restraints

## 2020-08-11 NOTE — INTERDISCIPLINARY/THERAPY
08/11/20 0947   Time Calculation   Start Time 0947   Stop Time 1004   Time Calculation (min) 17 min   OT Last Visit   OT Received On 08/11/20   General   Chart Reviewed Yes   Therapy Treatment Diagnosis longterm, R scapula fx, L clavicle , rib fx   OT Treatment Duration (Min) 17 Minutes   Is this a Co-Treatment? Yes  (PT)   Precautions   Other Precautions NWB bilateral UE, possible fx R hand, check x-ray ( 8/10).    Home Living   Home Living Comments Pt lethargic,  provided minimal information .  Pt is from [place], lives in a house with SO,  stairs in house did not states.    Prior Function   Prior Function Comments Pt here for Hewlettremberto monsalve, was indep prior to admit.     Subjective Comments   Subjective Comments RN ok's OT tx, pt seen for initial eval.  pt in bed at start and end of tx with all needs within reach.     Pain Assessment   Pain Assessment Eyes Closed/Patient Asleep   Pain Score   (pain in bilateral UE, did not rate. )   Cognition   Arousal/Alertness Impaired   Cognition Comment Pt with eyes closed, slow to respond,  provided minimal information.     Bed Mobility   Bed Mobility Assessed   Bed Mobility 1   Bed Mobility From 1 Supine   Bed Mobility Type 1 To and from   Bed Mobility to 1 Short sit   Level of Assistance 1 Dependent   Bed Mobility Comments 1 Pt did not assist with transition,  ortho requested pt be NWB for bilateral UE at this time.    Transfers   Transfer Not Assessed   Ambulation   Ambulation Not Assessed   Balance   Balance Impaired   Static Sitting Balance   Static Sitting Balance Surface Compliant   Static Sitting-Balance Support No upper extremity supported   Static Sitting-Level of Assistance Min assist x 1   Static Sitting-Comment/# of Minutes sat EOB x 7 mins with min assist for balance.  cues for NWB in bilateral UE>    LE Dressing   LE Dressing   (dependent to don socks. )   RUE Assessment   RUE Assessment X   RUE AROM (degrees)   RUE AROM Comment pt did not lift against gravity.  R  scapula fx,  plan for surgical fixation    LUE Assessment   LUE Assessment X   LUE AROM (degrees)   LUE AROM Comment Did not lift against gravity,  clavicle fx.     Light Touch   RUE Light Touch Grossly intact   LUE Light Touch Grossly intact   ICU Early Mobility   Current ICU Mobility Level Level I   ICU Mobility Level I (Goals & Progression) Patient Does Not Meets Criteria to Advance to Mobility Level II?   ICU Early Mobility OT Only Comments sat EOB x 7 mins with min assist.     Activity Tolerance   Position Sitting   Standing Endurance Patient limited by fatigue;Patient limited by pain   Assessment   Rehab Potential Good   Progress   (eval 8/10 )   Problem List Decreased ADL status;Decreased upper extremity range of motion;Decreased upper extremity strength;Decreased endurance;Decreased functional mobility;Decreased fine motor control;Decreased gross motor control;Decreased IADLs   Recommendations for Therapy Continue skilled therapy   Assessment Comment Pt with decreased arousal,  pain and fatigue are limiting factors.   Pt is not lifting arms against gravity .  Decreased indep with bed mobility and sitting balance.  Pt will benefit from skilled OT to progress early mobility protocol    Therapeutic Interventions (Time Spent in Minutes)   $ Therapeutic Activity Dynamic 9   Plan   Plan Comment EM 1,  EOB<  fx transfers,  check x-ray for R hand fx.     Treatment Interventions ADL retraining;Therapeutic activity;Therapeutic exercise;UE strengthening/ROM;Endurance training   OT Frequency 3-5x/wk   Date POC Due 08/18/20
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
## 2020-08-11 NOTE — NURSING END OF SHIFT
"ICU Nursing Shift Summary:    Patient: [facility]  MRN: [id number]  : 1899, Age: 89 y.o.    Location: [address] [place] [address]    Admit date: 8/10/2020  Primary Care Provider: Pcp No  Attending Provider: Mehran Sierra MD    20    Nursing Goals         Narrative Summary of Progress Towards Clinical Goals:  Received right after shift change. Pt following simple commands. Dr. Hunt here to see patient and right shoulder xray ordered. Showed right scapular fracture apparently. Pt vomited once during the night. Undigested food. This was right after giving flexeril, potassium, and tylenol. OG back to LIS. Fio2 requirements unchanged. A woman named Yojana zepeda who is reportedly the patients mother. She said the patients name is \"Nabilafrances Booth with  1985\". Waiting until after extubation to verify her name    Nursing Concerns/Thoughts for MD:      New Patient or Family Concerns/Issues:      Shift Summary:    Major Event(s):     Brief Narratives  · Changes in VS and Rhythm: NSR. Bp wnl  · Changes in Exam:        Significant Events & Communications to Providers (last 12 hours)      Last 5 Values    No documentation.              Oxygen Usage (last 12 hours)      Last 5 Values     Row Name 08/10/20 1745 08/10/20 1750 08/10/20 1800 08/10/20 1810 08/10/20 1815       Oxygen Therapy    SpO2  95 %  98 %  100 %  100 %  100 %    Row Name 08/10/20 1820 08/10/20 1825 08/10/20 1830 08/10/20 1835 08/10/20 1845       Oxygen Therapy    SpO2  100 %  100 %  100 %  100 %  100 %    FiO2 (%)  --  --  --  --  40 %    Row Name 08/10/20 1900 08/10/20 1915 08/10/20 1930 08/10/20 1945 08/10/20 195       Oxygen Therapy    SpO2  100 %  100 %  100 %  100 %  100 %    O2 Delivery Interface  Endotracheal tube  Endotracheal tube  Endotracheal tube  Endotracheal tube  Endotracheal tube    FiO2 (%)  40 %  40 %  40 %  40 %  30 %    Vent Mode  --  --  --  --  VC/AC    Row Name 08/10/20 2000 08/10/20 2015 08/10/20 2030 08/10/20 2100 " 08/10/20 2200       Oxygen Therapy    SpO2  100 %  100 %  99 %  100 %  99 %    O2 Delivery Interface  Endotracheal tube  Endotracheal tube  Endotracheal tube  Endotracheal tube  Endotracheal tube    FiO2 (%)  30 %  30 %  30 %  30 %  30 %    Vent Mode  --  --  --  VC/AC  VC/AC    Row Name 08/10/20 2252 08/10/20 2300 08/11/20 0000 08/11/20 0100 08/11/20 0200       Oxygen Therapy    SpO2  100 %  99 %  100 %  100 %  100 %    O2 Delivery Interface  --  Endotracheal tube  Endotracheal tube  Endotracheal tube  Endotracheal tube    FiO2 (%)  30 %  30 %  30 %  30 %  30 %    Vent Mode  VC/AC  VC/AC  VC/AC  VC/AC  VC/AC    Row Name 08/11/20 0300 08/11/20 0309 08/11/20 0400 08/11/20 0447 08/11/20 0500       Oxygen Therapy    SpO2  98 %  99 %  99 %  99 %  97 %    O2 Delivery Interface  Endotracheal tube  --  Endotracheal tube  --  Endotracheal tube    FiO2 (%)  30 %  30 %  30 %  30 %  30 %    Vent Mode  VC/AC  VC/AC  VC/AC  SPN-CPAP/PS  SPN-CPAP/PS    Row Name 08/11/20 0525                   Oxygen Therapy    SpO2  100 %        O2 Delivery Interface  Nasal cannula        O2 Flow Rate (L/min)  2 L/min                   Mobility (last 12 hours)      Last 5 Values     Row Name 08/10/20 1958                   Mobility    Anti-Embolism Devices  Bilateral;AE calf pump        Anti-Embolism Intervention  On            Urethral Catheter    Active Urethral Catheter     Name:   Placement date:   Placement time:   Site:   Days:    Urethral Catheter Latex;Temperature probe 16 Fr.   08/10/20    1743     less than 1            Active Lines    Active Central venous catheter / Peripherally inserted central catheter / Implantable Port / Hemodialysis catheter / Midline Catheter     None              Infusing Medications   Medication Dose Last Rate   • LR  200 mL/hr 200 mL/hr (08/11/20 0525)   • propofol  5-50 mcg/kg/min 50 mcg/kg/min (08/11/20 0335)       Current LOC: Intensive Care    Mitchel Dailey RN  08/11/20 5:43 AM

## 2020-08-11 NOTE — CROSS COVER NOTE
Patient was reexamined post CT. CT demonstrates a scalp hematoma without evidence of any intracranial pathology, no cervical spine fractures, multiple bilateral rib fractures and a small left pneumothorax.  A left-sided chest tube was curled in the subcutaneous tissues and not into the chest cavity.  This was subsequently removed in the ED.  Patient also noted to have small liver hypodensities and renal hypodensities consistent with contusions.  She will be admitted to the ICU in the trauma service.

## 2020-08-11 NOTE — NURSING NOTE
"Yojana Caraballo called who claimed to be patients mother. She verified a couple of tattoos that the patient has and this matched the description. Yojana apparently lives in [place] and is planning to come visit. Patients name is reportedly \"Nabila Booth\" with  \"1985\". Pt does answer to the name Nabila by opening her eyes.   "
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
## 2020-08-11 NOTE — PLAN OF CARE
Problem: Knowledge Deficit  Goal: Patient/family/caregiver demonstrates understanding of disease process, treatment plan, medications, and discharge instructions  Description: INTERVENTIONS:   1. Complete learning assessment and assess knowledge base  2. Provide teaching at level of understanding   3. Provide teaching via preferred learning methods  Outcome: Progressing     Problem: Potential for Compromised Skin Integrity  Goal: Skin Integrity is Maintained or Improved  Description: INTERVENTIONS:  1. Assess and monitor skin integrity  2. Collaborate with interdisciplinary team and initiate plans and interventions as needed  3. Alternate a full bath with partial baths for elderly   4. Monitor patient's hygiene practices   5. Collaborate with wound, ostomy, and continence nurse  Outcome: Progressing  Goal: Nutritional status is improving  Description: INTERVENTIONS:  1. Monitor and assess patient for malnutrition (ex- brittle hair, bruises, dry skin, pale skin and conjunctiva, muscle wasting, smooth red tongue, and disorientation)  2. Monitor patient's weight and dietary intake as ordered or per policy  3. Determine patient's food preferences and provide high-protein, high-caloric foods as appropriate  4. Assist patient with eating   5. Allow adequate time for meals   6. Encourage patient to take dietary supplement as ordered   7. Collaborate with dietitian  8. Include patient/family/caregiver in decisions related to nutrition  Outcome: Progressing  Goal: MOBILITY IS MAINTAINED OR IMPROVED  Description: INTERVENTIONS  1. Collaborate with interdisciplinary team and initiate plan and interventions as ordered (PT/OT)  2. Encourage ambulation  3. Up to chair for meals  4. Monitor for signs of deconditioning  Outcome: Progressing     Problem: Neurosensory - Adult  Goal: Achieves stable or improved neurological status  Description: INTERVENTIONS  1. Assess for and report changes in neurological status  2. Initiate  measures to prevent increased intracranial pressure  3. Maintain blood pressure and fluid volume within ordered parameters to optimize cerebral perfusion and minimize risk of hemorrhage  4. Monitor temperature, glucose, and sodium. Initiate appropriate interventions as ordered  Outcome: Progressing  Goal: Absence of seizures  Description: INTERVENTIONS  1. Monitor for seizure activity  2. Administer anti-seizure medications as ordered  3. Monitor neurological status  4. Assist patient in avoiding triggers, if identified  Outcome: Progressing  Goal: Remains free of injury related to seizures activity  Description: INTERVENTIONS  1. Maintain airway, patient safety  and administer oxygen as ordered  2. Monitor patient for seizure activity, document and report duration and description of seizure to provider  3. If seizure occurs, turn patient to side and suction secretions as needed  4. Reorient patient post seizure  5. Seizure pads on all 4 side rails  6. Instruct patient/family to notify RN of any seizure activity  7. Instruct patient/family to call for assistance with activity based on assessment  Outcome: Progressing  Goal: Achieves maximal functionality and self care  Description: INTERVENTIONS  1. Monitor swallowing and airway patency with patient fatigue and changes in neurological status  2. Encourage and assist patient to increase activity and self care with guidance from PT/OT  3. Encourage visually impaired, hearing impaired and aphasic patients to use assistive/communication devices  Outcome: Progressing     Problem: Cardiovascular - Adult  Goal: Maintains optimal cardiac output and hemodynamic stability  Description: INTERVENTIONS:  1. Monitor vital signs and rhythm  2. Monitor for hypotension and other signs of decreased cardiac output  3. Administer and titrate ordered vasoactive medications to optimize hemodynamic stability  4. Monitor for fluid overload/dehydration, weight gain, shortness of breath and  activity intolerance  5. Monitor arterial and/or venous puncture sites for bleeding and/or hematoma  6. Assess quality of pulses, capillary refill, edema, sensation, skin color and temperature  7. Assess for signs of decreased coronary artery perfusion - ex. angina  Outcome: Progressing  Goal: Absence of cardiac dysrhythmias or at baseline  Description: INTERVENTIONS:  1. Continuous cardiac monitoring, monitor vital signs, obtain 12 lead EKG if indicated  2. Administer antiarrhythmic and heart rate control medications as ordered  3. Initiate emergency measures for life threatening arrhythmias  4. Monitor electrolytes and administer replacement therapy as ordered  Outcome: Progressing     Problem: Respiratory - Adult  Goal: Achieves optimal ventilation and oxygenation  Description: INTERVENTIONS:  1. Assess for changes in respiratory status  2. Assess for changes in mentation and behavior  3. Position to facilitate oxygenation and minimize respiratory effort  4. Oxygen supplementation based on oxygen saturation or ABGs  5. Assess patient's ability to cough effectively  6. Encourage broncho-pulmonary hygiene including cough, deep breathe  7. Assess the need for suctioning   8. Assess and instruct to report SOB or any respiratory difficulty  9. Respiratory Therapy support as indicated, including medications and treatment.  Outcome: Progressing     Problem: Gastrointestinal - Adult  Goal: Minimal or absence of nausea and vomiting  Description: INTERVENTIONS:  1. Ensure adequate hydration  2. Monitor intake and output  3. Maintain NPO status until nausea and vomiting are resolved  4. Nasogastric tube to low intermittent suction as ordered  5. Administer ordered antiemetic medications as needed  6. Provide nonpharmacologic comfort measures as appropriate  7. Advance diet as ordered  8. Nutrition consult as indicated   Outcome: Progressing  Goal: Maintains or returns to baseline digestive function  Description:  INTERVENTIONS:  1. Assess bowel function  2. Ensure adequate hydration  3. Administer ordered medications as needed  4. Encourage mobilization and activity  5. Nutrition consult as indicated  6. Assess hydration and nutritional status  7. Assess characteristics and frequency of stool  8. Monitor for metabolic panel imbalances  9. Assess for treatment effectiveness  Outcome: Progressing  Goal: Maintains adequate nutritional intake  Description: INTERVENTIONS:  1. Monitor percentage of each meal consumed  2. Identify factors contributing to decreased intake, treat as appropriate  3. Assist with meals as needed  4. Monitor I&O, weight and lab values  5. Obtain nutritional consult as indicated  6. Administer alternative nutrition interventions as ordered  Outcome: Progressing  Goal: Establish and maintain optimal ostomy function  Description: INTERVENTIONS:  1. Assess bowel function  2. Encourage mobilization and activity  3. Assess ostomy stoma characteristics  4. Assess skin surrounding ostomy stoma  5. Empty/Change ostomy pouch as needed  6. Provide ostomy care  7. Consult Wound Care/Ostomy Nurses as indicated  8. Nutrition consult as indicated  Outcome: Progressing  Goal: Maintains Optimal Tissue Perfusion  Description: INTERVENTIONS:  1. Monitor for increased abdominal girth, firmness or intra-abdominal pressure  2. Monitor nutritional intake, intake/output, and weight  3. Assess for signs of dehydration  4. Assess blood pressure, heart rate, and oxygen saturation  5. Assess skin color, capillary refill and edema  6. Monitor metabolic panels, blood count panels, and coagulations panels as ordered  7. Assess bowel function  8. Assess for blood in emesis and stool  Outcome: Progressing  Goal: Nutrient intake appropriate for improving, restoring or maintaining nutritional needs  Description: INTERVENTIONS:  1. Assess nutritional status  2. Monitor oral intake, labs, and treatment plans  3. Provide appropriate diets,  oral nutritional supplements, and vitamin/mineral supplements  4. Monitor, and adjust tube feedings and TPN/PPN based on assessed needs  5. Provide specific nutrition education as appropriate  Outcome: Progressing     Problem: Genitourinary - Adult  Goal: Absence of urinary retention  Description: INTERVENTIONS:  1. Assess patient’s ability to void and empty bladder.  2. Monitor intake/output and perform bladder scan if indicated.  3. Place urinary catheter per order and initiate and maintain CAUTI bundle.  4. Administer medications to alleviate retention as needed.  5. Discuss catheterization for long term situations as appropriate.    Outcome: Progressing  Goal: Urinary catheter remains patent  Description: INTERVENTIONS:  1. Assess patency of urinary catheter.  2. Irrigate catheter per order if indicated and notify provider if unable to irrigate.  3. Assess need for a larger catheter size or a 3-way catheter for continuous bladder irrigation.  Outcome: Progressing  Goal: Absence of Urinary Infection  Description: INTERVENTIONS:  1. Assess urinary status for burning, urgency, frequency, and pain  2. Assess urine for color, cloudiness, odor, and amount  3. Monitor intake/output  4. Monitor lab values  5. Obtain urinalysis as ordered  6. Assess for neurological status changes    Outcome: Progressing     Problem: Metabolic/Fluid and Electrolytes - Adult  Goal: Electrolytes maintained within normal limits  Description: INTERVENTIONS:  1. Monitor labs and assess patient for signs and symptoms of electrolyte imbalances  2. Administer electrolyte replacement as ordered  3. Monitor response to electrolyte replacements, including repeat lab results as appropriate  4. Fluid restriction as ordered  5. Instruct patient on fluid and nutrition restrictions as appropriate  Outcome: Progressing  Goal: Maintain Optimal Renal Function and Hemodynamic Stability  Description: INTERVENTIONS:  1. Monitor labs and assess for signs and  symptoms of volume excess or deficit  2. Monitor intake, output and patient weight  3. Monitor urine specific gravity, serum osmolarity and serum sodium as indicated or ordered  4. Monitor response to interventions for patient's volume status, including labs, urine output, blood pressure (other measures as available)  5. Encourage oral intake as appropriate  6. Instruct patient on fluid and nutrition restrictions as appropriate  Outcome: Progressing  Goal: Glucose maintained within prescribed range  Description: INTERVENTIONS:  1. Monitor blood glucose as ordered  2. Assess for signs and symptoms of hyperglycemia and hypoglycemia  3. Administer ordered medications to maintain glucose within target range  4. Assess barriers to adequate nutritional intake and initiate nutrition consult as needed  5. Assess baseline knowledge and provide education as indicated  6. Monitor exercise as may reduce the requirements for insulin  Outcome: Progressing     Problem: Skin/Tissue Integrity - Adult  Goal: Skin integrity remains intact  Description: INTERVENTIONS  1. Assess and document risk factors for pressure ulcer development  2. Assess and document skin integrity  3. Monitor for areas of redness and/or skin breakdown  4. Initiate pressure ulcer prevention measures as indicated  Outcome: Progressing  Goal: Incisions, wounds, or drain sites healing without S/S of infection  Description: INTERVENTIONS  1. Assess and document risk factors for skin breakdown  2. Assess and document skin integrity  3. Assess and document dressing/incision, wound bed, drain sites and surrounding tissue  4. Implement wound care per orders  Outcome: Progressing  Goal: Oral mucous membranes remain intact  Description: INTERVENTIONS  1. Assess oral mucosa and hygiene practices  2. Implement preventative oral hygiene regimen  3. Implement oral medicated treatments as ordered  Outcome: Progressing     Problem: Hematologic - Adult  Goal: Maintains  hematologic stability  Description: INTERVENTIONS  1. Assess for signs and symptoms of bleeding or hemorrhage  2. Monitor labs  3. Administer supportive blood products/factors as ordered and appropriate  4. Administer medications as ordered  5. Initiate bleeding precautions as indicated  6. Educate patient/family to report signs/symptoms of bleeding  Outcome: Progressing     Problem: Musculoskeletal - Adult  Goal: Return mobility to safest level of function  Description: INTERVENTIONS:  1. Assess patient stability and activity tolerance for standing, transferring and ambulating w/ or w/o assistive devices  2. Assist with transfers and ambulation using safe practices  3. Ensure adequate protection for wounds/incisions during mobilization  4. Obtain PT/OT and other consults as needed  5. Apply Continuous Passive Motion per order to increase flexion toward goal  6. Instruct patient/family in ordered activity level  Outcome: Progressing  Goal: Maintain proper alignment of affected body part  Description: INTERVENTIONS:  1. Support and protect limb and body alignment per provider order  2. Instruct and reinforce with patient and family use of appropriate assistive device and precautions (e.g. spinal or hip dislocation precautions)  Outcome: Progressing  Goal: Return ADL status to a safe level of function  Description: INTERVENTIONS:  1. Assess patient's ADL deficits and provide assistive devices as needed  2. Obtain PT/OT consults as needed  3. Assist and instruct patient to increase activity and self care  Outcome: Progressing

## 2020-08-12 ENCOUNTER — ANESTHESIA (OUTPATIENT)
Dept: OPERATING ROOM | Facility: HOSPITAL | Age: 35
DRG: 957 | End: 2020-08-12
Payer: COMMERCIAL

## 2020-08-12 ENCOUNTER — ANESTHESIA EVENT (OUTPATIENT)
Dept: OPERATING ROOM | Facility: HOSPITAL | Age: 35
DRG: 957 | End: 2020-08-12
Payer: COMMERCIAL

## 2020-08-12 LAB
ANION GAP SERPL CALC-SCNC: 6 MMOL/L (ref 3–11)
BACTERIA #/AREA URNS AUTO: NORMAL /HPF
BILIRUB UR QL STRIP.AUTO: NEGATIVE
BUN SERPL-MCNC: 9 MG/DL (ref 7–25)
C TRACH DNA SPEC QL NAA+PROBE: NEGATIVE
CALCIUM SERPL-MCNC: 8.5 MG/DL (ref 8.6–10.3)
CHLORIDE SERPL-SCNC: 105 MMOL/L (ref 98–107)
CLARITY UR: ABNORMAL
CO2 SERPL-SCNC: 26 MMOL/L (ref 21–32)
COLOR UR: YELLOW
CREAT SERPL-MCNC: 0.53 MG/DL (ref 0.6–1.1)
ERYTHROCYTE [DISTWIDTH] IN BLOOD BY AUTOMATED COUNT: 14.1 % (ref 11.5–14)
GFR SERPL CREATININE-BSD FRML MDRD: 125 ML/MIN/1.73M*2
GLUCOSE SERPL-MCNC: 120 MG/DL (ref 70–105)
GLUCOSE UR STRIP.AUTO-MCNC: NEGATIVE MG/DL
HCT VFR BLD AUTO: 28.7 % (ref 34–45)
HGB BLD-MCNC: 9.8 G/DL (ref 11.5–15.5)
HGB UR QL STRIP.AUTO: ABNORMAL
KETONES UR STRIP.AUTO-MCNC: NEGATIVE MG/DL
LEUKOCYTE ESTERASE UR QL STRIP: NEGATIVE
MCH RBC QN AUTO: 31.8 PG (ref 28–33)
MCHC RBC AUTO-ENTMCNC: 34.1 G/DL (ref 32–36)
MCV RBC AUTO: 93.2 FL (ref 81–97)
N GONORRHOEA DNA SPEC QL NAA+PROBE: NEGATIVE
NITRITE UR QL STRIP.AUTO: NEGATIVE
PH UR STRIP.AUTO: 7 PH
PLATELET # BLD AUTO: 191 10*3/UL (ref 140–350)
PMV BLD AUTO: 7.3 FL (ref 6.9–10.8)
POTASSIUM SERPL-SCNC: 3.7 MMOL/L (ref 3.5–5.1)
PROT UR STRIP.AUTO-MCNC: NEGATIVE MG/DL
RBC # BLD AUTO: 3.08 10*6/ΜL (ref 3.7–5.3)
RBC #/AREA URNS AUTO: NORMAL /HPF
SODIUM SERPL-SCNC: 137 MMOL/L (ref 135–145)
SP GR UR STRIP.AUTO: 1.01 (ref 1–1.03)
SQUAMOUS #/AREA URNS AUTO: NORMAL /HPF
T VAGINALIS DNA VAG QL PROBE+SIG AMP: NEGATIVE
UROBILINOGEN UR STRIP.AUTO-MCNC: <2 E.U./DL
WBC # BLD AUTO: 11.3 10*3/UL (ref 4.5–10.5)
WBC #/AREA URNS AUTO: NORMAL /HPF

## 2020-08-12 PROCEDURE — 2580000300 HC RX 258: Performed by: NURSE PRACTITIONER

## 2020-08-12 PROCEDURE — 2580000300 HC RX 258: Performed by: PHYSICIAN ASSISTANT

## 2020-08-12 PROCEDURE — 6360000200 HC RX 636 W HCPCS (ALT 250 FOR IP): Performed by: NURSE PRACTITIONER

## 2020-08-12 PROCEDURE — 87591 N.GONORRHOEAE DNA AMP PROB: CPT | Performed by: INTERNAL MEDICINE

## 2020-08-12 PROCEDURE — 6370000100 HC RX 637 (ALT 250 FOR IP): Performed by: NURSE PRACTITIONER

## 2020-08-12 PROCEDURE — 6360000200 HC RX 636 W HCPCS (ALT 250 FOR IP): Performed by: INTERNAL MEDICINE

## 2020-08-12 PROCEDURE — 99232 SBSQ HOSP IP/OBS MODERATE 35: CPT | Performed by: INTERNAL MEDICINE

## 2020-08-12 PROCEDURE — 94799 UNLISTED PULMONARY SVC/PX: CPT

## 2020-08-12 PROCEDURE — 6370000100 HC RX 637 (ALT 250 FOR IP): Performed by: INTERNAL MEDICINE

## 2020-08-12 PROCEDURE — 81001 URINALYSIS AUTO W/SCOPE: CPT | Performed by: INTERNAL MEDICINE

## 2020-08-12 PROCEDURE — 2590000100 HC RX 259: Performed by: INTERNAL MEDICINE

## 2020-08-12 PROCEDURE — 1110000100 HC ROOM PRIVATE W TELE

## 2020-08-12 PROCEDURE — 80048 BASIC METABOLIC PNL TOTAL CA: CPT | Performed by: NURSE PRACTITIONER

## 2020-08-12 PROCEDURE — 36415 COLL VENOUS BLD VENIPUNCTURE: CPT | Performed by: NURSE PRACTITIONER

## 2020-08-12 PROCEDURE — 85027 COMPLETE CBC AUTOMATED: CPT | Performed by: NURSE PRACTITIONER

## 2020-08-12 PROCEDURE — 99232 SBSQ HOSP IP/OBS MODERATE 35: CPT | Performed by: NURSE PRACTITIONER

## 2020-08-12 PROCEDURE — 87661 TRICHOMONAS VAGINALIS AMPLIF: CPT | Performed by: INTERNAL MEDICINE

## 2020-08-12 PROCEDURE — 87491 CHLMYD TRACH DNA AMP PROBE: CPT | Performed by: INTERNAL MEDICINE

## 2020-08-12 RX ORDER — SODIUM CHLORIDE 0.9 % (FLUSH) 0.9 %
2 SYRINGE (ML) INJECTION AS NEEDED
Status: DISCONTINUED | OUTPATIENT
Start: 2020-08-12 | End: 2020-08-12 | Stop reason: HOSPADM

## 2020-08-12 RX ORDER — SODIUM CHLORIDE, SODIUM LACTATE, POTASSIUM CHLORIDE, CALCIUM CHLORIDE 600; 310; 30; 20 MG/100ML; MG/100ML; MG/100ML; MG/100ML
15 INJECTION, SOLUTION INTRAVENOUS CONTINUOUS
Status: DISCONTINUED | OUTPATIENT
Start: 2020-08-12 | End: 2020-08-15

## 2020-08-12 RX ORDER — SODIUM CHLORIDE, SODIUM LACTATE, POTASSIUM CHLORIDE, CALCIUM CHLORIDE 600; 310; 30; 20 MG/100ML; MG/100ML; MG/100ML; MG/100ML
100 INJECTION, SOLUTION INTRAVENOUS CONTINUOUS
Status: DISCONTINUED | OUTPATIENT
Start: 2020-08-12 | End: 2020-08-13

## 2020-08-12 RX ORDER — SODIUM CHLORIDE 0.9 % (FLUSH) 0.9 %
2 SYRINGE (ML) INJECTION EVERY 8 HOURS SCHEDULED
Status: DISCONTINUED | OUTPATIENT
Start: 2020-08-12 | End: 2020-08-12 | Stop reason: HOSPADM

## 2020-08-12 RX ORDER — TAMSULOSIN HYDROCHLORIDE 0.4 MG/1
0.4 CAPSULE ORAL
Status: DISCONTINUED | OUTPATIENT
Start: 2020-08-12 | End: 2020-08-18 | Stop reason: HOSPADM

## 2020-08-12 RX ADMIN — ACETAMINOPHEN 650 MG: 325 TABLET ORAL at 00:16

## 2020-08-12 RX ADMIN — ACETAMINOPHEN 650 MG: 325 TABLET ORAL at 11:22

## 2020-08-12 RX ADMIN — SODIUM CHLORIDE, POTASSIUM CHLORIDE, SODIUM LACTATE AND CALCIUM CHLORIDE 125 ML/HR: 600; 310; 30; 20 INJECTION, SOLUTION INTRAVENOUS at 16:10

## 2020-08-12 RX ADMIN — ONDANSETRON HYDROCHLORIDE 4 MG: 4 TABLET, FILM COATED ORAL at 19:56

## 2020-08-12 RX ADMIN — FENTANYL CITRATE 50 MCG: 50 INJECTION, SOLUTION INTRAMUSCULAR; INTRAVENOUS at 22:07

## 2020-08-12 RX ADMIN — ACETAMINOPHEN 650 MG: 325 TABLET ORAL at 07:14

## 2020-08-12 RX ADMIN — Medication 8.6 MG: at 20:00

## 2020-08-12 RX ADMIN — GABAPENTIN 300 MG: 300 CAPSULE ORAL at 20:00

## 2020-08-12 RX ADMIN — GABAPENTIN 300 MG: 300 CAPSULE ORAL at 15:08

## 2020-08-12 RX ADMIN — FENTANYL CITRATE 50 MCG: 50 INJECTION, SOLUTION INTRAMUSCULAR; INTRAVENOUS at 07:15

## 2020-08-12 RX ADMIN — TAMSULOSIN HYDROCHLORIDE 0.4 MG: 0.4 CAPSULE ORAL at 10:46

## 2020-08-12 RX ADMIN — OXYCODONE HYDROCHLORIDE 10 MG: 5 TABLET ORAL at 11:21

## 2020-08-12 RX ADMIN — ACETAMINOPHEN 650 MG: 325 TABLET ORAL at 18:06

## 2020-08-12 RX ADMIN — ENOXAPARIN SODIUM 40 MG: 40 INJECTION SUBCUTANEOUS at 12:57

## 2020-08-12 RX ADMIN — OXYCODONE HYDROCHLORIDE 10 MG: 5 TABLET ORAL at 19:55

## 2020-08-12 RX ADMIN — SODIUM CHLORIDE, POTASSIUM CHLORIDE, SODIUM LACTATE AND CALCIUM CHLORIDE 125 ML/HR: 600; 310; 30; 20 INJECTION, SOLUTION INTRAVENOUS at 08:26

## 2020-08-12 RX ADMIN — FENTANYL CITRATE 50 MCG: 50 INJECTION, SOLUTION INTRAMUSCULAR; INTRAVENOUS at 12:56

## 2020-08-12 ASSESSMENT — ENCOUNTER SYMPTOMS: SHORTNESS OF BREATH: 0
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
## 2020-08-12 NOTE — NURSING END OF SHIFT
Nursing End of Shift Summary:    Patient: Nabila Booth  MRN: [id number]  : 1985, Age: 34 y.o.    Location: [address] [place] [address]    Nursing Goals  Clinical Goals for the Shift: Maintain safety, manage pain, promote rest.     Narrative Summary of Progress Toward Clinical Goals:  Patient remained safe all shift. NPO after midnight. Pain controled with PRN medication. Turn Q2H.     Barriers to Goals/Nursing Concerns:  Yes    New Patient or Family Concerns/Issues:  No    Shift Summary:      Significant Events & Communications to Providers (last 12 hours)      Last 5 Values    No documentation.              Oxygen Usage (last 12 hours)      Last 5 Values     Row Name 20                   Oxygen Weaning Trial by Nursing    Is Patient on Room Air OR on the Same Amount of O2 as at Home?  No        [facility]  No                   Mobility (last 12 hours)      Last 5 Values     Row Name 20                   Mobility    Anti-Embolism Devices  Bilateral;AE calf pump        Anti-Embolism Intervention  On            Urethral Catheter    Active Urethral Catheter     None            Active Lines    Active Central venous catheter / Peripherally inserted central catheter / Implantable Port / Hemodialysis catheter / Midline Catheter     None              Infusing Medications   Medication Dose Last Rate     PRN Medications   Medication Dose Last Dose   • fentaNYL citrate (PF)  50 mcg     • oxyCODONE  5-10 mg 10 mg at 204   • sodium chloride  3 mL     • ondansetron  4 mg      Or   • ondansetron  4 mg 4 mg at 08/10/20 2223   • sodium chloride 0.9% (NS)  25-50 mL       _________________________  Samia Herr RN  20 5:17 AM

## 2020-08-12 NOTE — MEDICATION HISTORY SPECIALIST NOTES
[facility] IPOF [id number]    CSN: [id number]  : [id number]    Patient unable to verify medications at time of interview.   Med list updated per resources available at this time:      Medication History Specialist will continue to follow-up for 3 days. See scanned documents for medication resources to date. Informed provider of unable to assess status.
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
## 2020-08-12 NOTE — NURSING END OF SHIFT
ICU Nursing Shift Summary:    Patient: Nabila Booth  MRN: [id number]  : 1985, Age: 34 y.o.    [facility]    Admit date: 8/10/2020  Primary Care Provider: Pcp No  Attending Provider: Mehran Sierra MD    20    Nursing Goals     Promote Comfort, manage pain.    Narrative Summary of Progress Towards Clinical Goals:   Pt. Slept most of the day, but was overall still slightly lethargic.    Nursing Concerns/Thoughts for MD:  None    New Patient or Family Concerns/Issues:  None    Shift Summary:    Major Event(s): None    Brief Narratives  · Changes in VS and Rhythm: None   · Changes in Exam: None       Significant Events & Communications to Providers (last 12 hours)      Last 5 Values    No documentation.              Oxygen Usage (last 12 hours)      Last 5 Values     Row Name 20 0700 20 0800 20 0900 20 1000 20 1100       Oxygen Therapy    SpO2  98 %  91 %  93 %  91 %  94 %    O2 Delivery Interface  Nasal cannula  Nasal cannula  Nasal cannula  Nasal cannula  --    O2 Flow Rate (L/min)  2 L/min  0.5 L/min  0.5 L/min  0.5 L/min  --    Row Name 20 1200 20 1300 20 1400 20 1500 20 1600       Oxygen Therapy    SpO2  91 %  (!) 89 %  90 %  92 %  91 %    O2 Delivery Interface  Nasal cannula  --  Nasal cannula  Nasal cannula  Nasal cannula    O2 Flow Rate (L/min)  --  --  0.5 L/min  0.5 L/min  0.5 L/min               Mobility (last 12 hours)      Last 5 Values     Row Name 20 0900                   Mobility    Activity  Dangle        Anti-Embolism Devices  Bilateral;AE calf pump        Anti-Embolism Intervention  On            Urethral Catheter    Active Urethral Catheter     None            Active Lines    Active Central venous catheter / Peripherally inserted central catheter / Implantable Port / Hemodialysis catheter / Midline Catheter     None              Infusing Medications   Medication Dose Last Rate       Current LOC: General  Med/Surg    Kierra Barclay RN  08/11/20 6:24 PM
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
## 2020-08-12 NOTE — ANESTHESIA PREPROCEDURE EVALUATION
"Pre-Procedure Assessment    Patient: Nabila Booth, female, 34 y.o.    Ht Readings from Last 1 Encounters:   08/10/20 1.676 m (5' 6\")     Wt Readings from Last 1 Encounters:   08/11/20 81.7 kg (180 lb 1.9 oz)       Last Vitals  BP      Temp      Pulse     Resp      SpO2      Pain Score 8 (08/12/20 0715)       Problem list reviewed and Medical history reviewed    No history of anesthetic complications:          Airway   Mallampati: IV  TM distance: >3 FB  Neck ROM: full      Dental      Pulmonary - negative ROS    breath sounds clear to auscultation  (-) shortness of breath    ROS comment: Has been on 1L NC, stable per nursing  Cardiovascular - negative ROS  (-) past MI, angina    Rhythm: regular  Rate: normal  ROS comment: Hemodynamically stable per nurse    Mental Status/Neuro/Psych    Pt is lethargic.        Comments: Baseline cognitive delay? (nursing)    GI/Hepatic/Renal    (+) liver disease,   (-) renal disease    Comments: Urinary retention    Cr slightly low    Endo/Other      Comments: anemic  Abdominal      Other findings: Poor effort from an exam point     Social History     Tobacco Use   • Smoking status: Not on file   Substance Use Topics   • Alcohol use: Not on file      Hematology   WBC   Date Value Ref Range Status   08/12/2020 11.3 (H) 4.5 - 10.5 10*3/uL Final     RBC   Date Value Ref Range Status   08/12/2020 3.08 (L) 3.70 - 5.30 10*6/µL Final     MCV   Date Value Ref Range Status   08/12/2020 93.2 81.0 - 97.0 fL Final     Hemoglobin   Date Value Ref Range Status   08/12/2020 9.8 (L) 11.5 - 15.5 g/dL Final     Hematocrit   Date Value Ref Range Status   08/12/2020 28.7 (L) 34.0 - 45.0 % Final     Platelets   Date Value Ref Range Status   08/12/2020 191 140 - 350 10*3/uL Final      Coagulation   Protime   Date Value Ref Range Status   08/10/2020 12.3 9.4 - 12.5 seconds Final     PTT   Date Value Ref Range Status   08/10/2020 24.8 (L) 25.1 - 36.5 SECONDS Final     INR   Date Value Ref Range Status "   08/10/2020 1.1 <=1.1 Final      General Chemistry   POC Glucose   Date Value Ref Range Status   08/11/2020 115 (H) 70 - 105 mg/dL Final     Calcium   Date Value Ref Range Status   08/12/2020 8.5 (L) 8.6 - 10.3 mg/dL Final     BUN   Date Value Ref Range Status   08/12/2020 9 7 - 25 mg/dL Final     Creatinine   Date Value Ref Range Status   08/12/2020 0.53 (L) 0.60 - 1.10 mg/dL Final     Glucose   Date Value Ref Range Status   08/12/2020 120 (H) 70 - 105 mg/dL Final     Sodium   Date Value Ref Range Status   08/12/2020 137 135 - 145 mmol/L Final     Potassium   Date Value Ref Range Status   08/12/2020 3.7 3.5 - 5.1 mmol/L Final     CO2   Date Value Ref Range Status   08/12/2020 26 21 - 32 mmol/L Final     Chloride   Date Value Ref Range Status   08/12/2020 105 98 - 107 mmol/L Final     Anesthesia Plan    ASA 2   NPO status reviewed: > 6 hours    General         Induction: intravenous   Airway Planning: oral ET tube    Additional Comments: Have Glidescope available      Plan for postoperative opioid use.    Anesthetic plan and risks discussed with patient.      Plan discussed with CRNA.
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
## 2020-08-12 NOTE — PLAN OF CARE
Problem: Knowledge Deficit  Goal: Patient/family/caregiver demonstrates understanding of disease process, treatment plan, medications, and discharge instructions  Description: INTERVENTIONS:   1. Complete learning assessment and assess knowledge base  2. Provide teaching at level of understanding   3. Provide teaching via preferred learning methods  8/11/2020 2203 by Samia Herr RN  Outcome: Progressing  8/11/2020 2201 by Samia Herr RN  Flowsheets (Taken 8/11/2020 2201)  Patient/family/caregiver demonstrates understanding of disease process, treatment plan, medications, and discharge instructions:   Complete learning assessment and assess knowledge base   Provide teaching at level of understanding   Provide teaching via preferred learning methods     Problem: Potential for Compromised Skin Integrity  Goal: Skin Integrity is Maintained or Improved  Description: INTERVENTIONS:  1. Assess and monitor skin integrity  2. Collaborate with interdisciplinary team and initiate plans and interventions as needed  3. Alternate a full bath with partial baths for elderly   4. Monitor patient's hygiene practices   5. Collaborate with wound, ostomy, and continence nurse  8/11/2020 2203 by Samia Herr RN  Outcome: Progressing  8/11/2020 2201 by Samia Herr RN  Flowsheets (Taken 8/11/2020 2201)  Skin integrity is maintained or improved:   Assess and monitor skin integrity   Collaborate with interdisciplinary team and initiate plans and interventions as needed   Alternate a full bath with partial baths for elderly   Monitor patient's hygiene practices   Collaborate with wound, ostomy, and continence nurse  Goal: Nutritional status is improving  Description: INTERVENTIONS:  1. Monitor and assess patient for malnutrition (ex- brittle hair, bruises, dry skin, pale skin and conjunctiva, muscle wasting, smooth red tongue, and disorientation)  2. Monitor patient's weight and dietary intake as ordered or per policy  3.  Determine patient's food preferences and provide high-protein, high-caloric foods as appropriate  4. Assist patient with eating   5. Allow adequate time for meals   6. Encourage patient to take dietary supplement as ordered   7. Collaborate with dietitian  8. Include patient/family/caregiver in decisions related to nutrition  8/11/2020 2203 by Samia Herr RN  Outcome: Progressing  8/11/2020 2201 by Samia Herr RN  Flowsheets (Taken 8/11/2020 2201)  Nutritional status is improving:   Monitor and assess patient for malnutrition (ex- brittle hair, bruises, dry skin, pale skin and conjunctiva, muscle wasting, smooth red tongue, and disorientation)   Monitor patient's weight and dietary intake as ordered or per policy   Determine patient's food preferences and provide high-protein, high-caloric foods as appropriate   Assist patient with eating   Allow adequate time for meals   Encourage patient to take dietary supplement as ordered   Collaborate with dietitian   Include patient/family/caregiver in decisions related to nutrition  Goal: MOBILITY IS MAINTAINED OR IMPROVED  Description: INTERVENTIONS  1. Collaborate with interdisciplinary team and initiate plan and interventions as ordered (PT/OT)  2. Encourage ambulation  3. Up to chair for meals  4. Monitor for signs of deconditioning  8/11/2020 2203 by Samia Herr RN  Outcome: Progressing  8/11/2020 2201 by Samia Herr RN  Flowsheets (Taken 8/11/2020 2201)  Mobility is Maintained or Improved:   Collaborate with interdisciplinary team and initiate plan and interventions as ordered (PT/OT)   Encourage ambulation   Up to chair for meals   Monitor for signs of deconditioning     Problem: Neurosensory - Adult  Goal: Achieves stable or improved neurological status  Description: INTERVENTIONS  1. Assess for and report changes in neurological status  2. Initiate measures to prevent increased intracranial pressure  3. Maintain blood pressure and fluid volume  within ordered parameters to optimize cerebral perfusion and minimize risk of hemorrhage  4. Monitor temperature, glucose, and sodium. Initiate appropriate interventions as ordered  8/11/2020 2203 by Samia Herr RN  Outcome: Progressing  8/11/2020 2201 by Samia Herr RN  Flowsheets (Taken 8/11/2020 2201)  Achieves stable or improved neurological status:   Assess for and report changes in neurological status   Initiate measures to prevent increased intracranial pressure   Monitor temperature, glucose, and sodium. Initiate appropriate interventions as ordered   Maintain blood pressure and fluid volume within ordered parameters to optimize cerebral perfusion and minimize risk of hemorrhage  Goal: Absence of seizures  Description: INTERVENTIONS  1. Monitor for seizure activity  2. Administer anti-seizure medications as ordered  3. Monitor neurological status  4. Assist patient in avoiding triggers, if identified  8/11/2020 2203 by Samia Herr RN  Outcome: Progressing  8/11/2020 2201 by Samia Herr RN  Flowsheets (Taken 8/11/2020 2201)  Absence of seizures:   Monitor for seizure activity   Administer anti-seizure medications as ordered   Monitor neurological status   Assist patient in avoiding triggers, if identified  Goal: Remains free of injury related to seizures activity  Description: INTERVENTIONS  1. Maintain airway, patient safety  and administer oxygen as ordered  2. Monitor patient for seizure activity, document and report duration and description of seizure to provider  3. If seizure occurs, turn patient to side and suction secretions as needed  4. Reorient patient post seizure  5. Seizure pads on all 4 side rails  6. Instruct patient/family to notify RN of any seizure activity  7. Instruct patient/family to call for assistance with activity based on assessment  8/11/2020 2203 by Samia Herr RN  Outcome: Progressing  8/11/2020 2201 by Samia Herr RN  Flowsheets (Taken 8/11/2020  2201)  Remains free of injury related to seizure activity:   Maintain airway, patient safety  and administer oxygen as ordered   Monitor patient for seizure activity, document and report duration and description of seizure to provider   If seizure occurs, turn patient to side and suction secretions as needed   Reorient patient post seizure   Seizure pads on all 4 side rails   Instruct patient/family to notify RN of any seizure activity   Instruct patient/family to call for assistance with activity based on assessment  Goal: Achieves maximal functionality and self care  Description: INTERVENTIONS  1. Monitor swallowing and airway patency with patient fatigue and changes in neurological status  2. Encourage and assist patient to increase activity and self care with guidance from PT/OT  3. Encourage visually impaired, hearing impaired and aphasic patients to use assistive/communication devices  8/11/2020 2203 by Samia Herr RN  Outcome: Progressing  8/11/2020 2201 by Samia Herr RN  Flowsheets (Taken 8/11/2020 2201)  Achieves maximal functionality and self care:   Monitor swallowing and airway patency with patient fatigue and changes in neurological status   Encourage and assist patient to increase activity and self care with guidance from PT/OT   Encourage visually impaired, hearing impaired and aphasic patients to use assistive/communication devices     Problem: Cardiovascular - Adult  Goal: Maintains optimal cardiac output and hemodynamic stability  Description: INTERVENTIONS:  1. Monitor vital signs and rhythm  2. Monitor for hypotension and other signs of decreased cardiac output  3. Administer and titrate ordered vasoactive medications to optimize hemodynamic stability  4. Monitor for fluid overload/dehydration, weight gain, shortness of breath and activity intolerance  5. Monitor arterial and/or venous puncture sites for bleeding and/or hematoma  6. Assess quality of pulses, capillary refill, edema,  sensation, skin color and temperature  7. Assess for signs of decreased coronary artery perfusion - ex. angina  8/11/2020 2203 by Samia Herr RN  Outcome: Progressing  8/11/2020 2201 by Samia Herr RN  Flowsheets (Taken 8/11/2020 2201)  Maintain optimal cardiac output and hemodynamic stability:   Monitor vital signs and rhythm   Monitor for hypotension and other signs of decreased cardiac output   Administer and titrate ordered vasoactive medications to optimize hemodynamic stability   Monitor for fluid overload/dehydration, weight gain, shortness of breath and activity intolerance   Assess quality of pulses, capillary refill, edema, sensation, skin color and temperature   Monitor arterial and/or venous puncture sites for bleeding and/or hematoma   Assess for signs of decreased coronary artery perfusion - ex. angina  Goal: Absence of cardiac dysrhythmias or at baseline  Description: INTERVENTIONS:  1. Continuous cardiac monitoring, monitor vital signs, obtain 12 lead EKG if indicated  2. Administer antiarrhythmic and heart rate control medications as ordered  3. Initiate emergency measures for life threatening arrhythmias  4. Monitor electrolytes and administer replacement therapy as ordered  8/11/2020 2203 by Samia Herr RN  Outcome: Progressing  8/11/2020 2201 by Samia Herr RN  Flowsheets (Taken 8/11/2020 2201)  Absence of cardiac dysrhythmias or at baseline:   Continuous cardiac monitoring, monitor vital signs, obtain 12 lead EKG if indicated   Administer antiarrhythmic and heart rate control medications as ordered   Initiate emergency measures for life threatening arrhythmias   Monitor electrolytes and administer replacement therapy as ordered     Problem: Respiratory - Adult  Goal: Achieves optimal ventilation and oxygenation  Description: INTERVENTIONS:  1. Assess for changes in respiratory status  2. Assess for changes in mentation and behavior  3. Position to facilitate oxygenation and  minimize respiratory effort  4. Oxygen supplementation based on oxygen saturation or ABGs  5. Assess patient's ability to cough effectively  6. Encourage broncho-pulmonary hygiene including cough, deep breathe  7. Assess the need for suctioning   8. Assess and instruct to report SOB or any respiratory difficulty  9. Respiratory Therapy support as indicated, including medications and treatment.  8/11/2020 2203 by Samia Herr RN  Outcome: Progressing  8/11/2020 2201 by Samia Herr RN  Flowsheets (Taken 8/11/2020 2201)  Achieves optimal ventilation and oxygenation:   Assess for changes in respiratory status   Assess for changes in mentation and behavior   Position to facilitate oxygenation and minimize respiratory effort   Oxygen supplementation based on oxygen saturation or arterial blood gases   Assess patient's ability to cough effectively   Encourage broncho-pulmonary hygiene including cough, deep breathe   Assess the need for suctioning   Assess and instruct to report shortness of breath or any respiratory difficulty   Respiratory Therapy support as indicated, including medications and treatment     Problem: Gastrointestinal - Adult  Goal: Minimal or absence of nausea and vomiting  Description: INTERVENTIONS:  1. Ensure adequate hydration  2. Monitor intake and output  3. Maintain NPO status until nausea and vomiting are resolved  4. Nasogastric tube to low intermittent suction as ordered  5. Administer ordered antiemetic medications as needed  6. Provide nonpharmacologic comfort measures as appropriate  7. Advance diet as ordered  8. Nutrition consult as indicated   8/11/2020 2203 by Samia Herr RN  Outcome: Progressing  8/11/2020 2201 by Samia Herr RN  Flowsheets (Taken 8/11/2020 2201)  Minimal or absence of nausea and vomiting:   Ensure adequate hydration   Monitor intake and output   Maintain NPO status until nausea and vomiting are resolved   Nasogastric tube to low intermittent suction  as ordered   Administer ordered antiemetic medications as needed   Provide nonpharmacologic comfort measures as appropriate   Advance diet as ordered   Nutrition consult as indicated  Goal: Maintains or returns to baseline digestive function  Description: INTERVENTIONS:  1. Assess bowel function  2. Ensure adequate hydration  3. Administer ordered medications as needed  4. Encourage mobilization and activity  5. Nutrition consult as indicated  6. Assess hydration and nutritional status  7. Assess characteristics and frequency of stool  8. Monitor for metabolic panel imbalances  9. Assess for treatment effectiveness  8/11/2020 2203 by Samia Herr RN  Outcome: Progressing  8/11/2020 2201 by Samia Herr RN  Flowsheets (Taken 8/11/2020 2201)  Maintains or returns to baseline bowel function:   Assess bowel function   Ensure adequate hydration   Administer ordered medications as needed   Encourage mobilization and activity   Nutrition consult as indicated   Assess hydration and nutritional status   Assess characteristics and frequency of stool   Assess for treatment effectiveness   Monitor for metabolic panel imbalances  Goal: Maintains adequate nutritional intake  Description: INTERVENTIONS:  1. Monitor percentage of each meal consumed  2. Identify factors contributing to decreased intake, treat as appropriate  3. Assist with meals as needed  4. Monitor I&O, weight and lab values  5. Obtain nutritional consult as indicated  6. Administer alternative nutrition interventions as ordered  8/11/2020 2203 by Samia Herr RN  Outcome: Progressing  8/11/2020 2201 by Samia Herr RN  Flowsheets (Taken 8/11/2020 2201)  Maintains adequate nutritional intake:   Monitor percentage of each meal consumed   Identify factors contributing to decreased intake, treat as appropriate   Assist with meals as needed   Monitor I&O, weight and lab values   Obtain nutritional consult as indicated   Administer alternative nutrition  interventions as ordered  Goal: Establish and maintain optimal ostomy function  Description: INTERVENTIONS:  1. Assess bowel function  2. Encourage mobilization and activity  3. Assess ostomy stoma characteristics  4. Assess skin surrounding ostomy stoma  5. Empty/Change ostomy pouch as needed  6. Provide ostomy care  7. Consult Wound Care/Ostomy Nurses as indicated  8. Nutrition consult as indicated  8/11/2020 2203 by Samia Herr RN  Outcome: Progressing  8/11/2020 2201 by Samia Herr RN  Flowsheets (Taken 8/11/2020 2201)  Establish and maintain optimal ostomy function:   Assess bowel function   Encourage mobilization and activity   Assess ostomy stoma characteristics   Assess skin surrounding ostomy stoma   Empty/Change ostomy pouch as needed   Provide ostomy care   Consult Wound Care/Ostomy Nurse as indicated   Nutrition consult as indicated  Goal: Maintains Optimal Tissue Perfusion  Description: INTERVENTIONS:  1. Monitor for increased abdominal girth, firmness or intra-abdominal pressure  2. Monitor nutritional intake, intake/output, and weight  3. Assess for signs of dehydration  4. Assess blood pressure, heart rate, and oxygen saturation  5. Assess skin color, capillary refill and edema  6. Monitor metabolic panels, blood count panels, and coagulations panels as ordered  7. Assess bowel function  8. Assess for blood in emesis and stool  8/11/2020 2203 by Samia Herr RN  Outcome: Progressing  8/11/2020 2201 by Samia Herr RN  Flowsheets (Taken 8/11/2020 2201)  Maintains Optimal Tissue Perfusion:   Monitor for increased abdominal girth, firmness or intra-abdominal pressure   Monitor nutritional intake, intake/output, and weight   Assess for signs of dehydration   Assess blood pressure, heart rate, and oxygen saturation   Assess skin color, capillary refill and edema  Goal: Nutrient intake appropriate for improving, restoring or maintaining nutritional needs  Description: INTERVENTIONS:  1.  Assess nutritional status  2. Monitor oral intake, labs, and treatment plans  3. Provide appropriate diets, oral nutritional supplements, and vitamin/mineral supplements  4. Monitor, and adjust tube feedings and TPN/PPN based on assessed needs  5. Provide specific nutrition education as appropriate  Outcome: Progressing     Problem: Genitourinary - Adult  Goal: Absence of urinary retention  Description: INTERVENTIONS:  1. Assess patient’s ability to void and empty bladder.  2. Monitor intake/output and perform bladder scan if indicated.  3. Place urinary catheter per order and initiate and maintain CAUTI bundle.  4. Administer medications to alleviate retention as needed.  5. Discuss catheterization for long term situations as appropriate.    Outcome: Progressing  Goal: Urinary catheter remains patent  Description: INTERVENTIONS:  1. Assess patency of urinary catheter.  2. Irrigate catheter per order if indicated and notify provider if unable to irrigate.  3. Assess need for a larger catheter size or a 3-way catheter for continuous bladder irrigation.  Outcome: Progressing  Goal: Absence of Urinary Infection  Description: INTERVENTIONS:  1. Assess urinary status for burning, urgency, frequency, and pain  2. Assess urine for color, cloudiness, odor, and amount  3. Monitor intake/output  4. Monitor lab values  5. Obtain urinalysis as ordered  6. Assess for neurological status changes    Outcome: Progressing     Problem: Metabolic/Fluid and Electrolytes - Adult  Goal: Electrolytes maintained within normal limits  Description: INTERVENTIONS:  1. Monitor labs and assess patient for signs and symptoms of electrolyte imbalances  2. Administer electrolyte replacement as ordered  3. Monitor response to electrolyte replacements, including repeat lab results as appropriate  4. Fluid restriction as ordered  5. Instruct patient on fluid and nutrition restrictions as appropriate  Outcome: Progressing  Goal: Maintain Optimal Renal  Function and Hemodynamic Stability  Description: INTERVENTIONS:  1. Monitor labs and assess for signs and symptoms of volume excess or deficit  2. Monitor intake, output and patient weight  3. Monitor urine specific gravity, serum osmolarity and serum sodium as indicated or ordered  4. Monitor response to interventions for patient's volume status, including labs, urine output, blood pressure (other measures as available)  5. Encourage oral intake as appropriate  6. Instruct patient on fluid and nutrition restrictions as appropriate  Outcome: Progressing  Goal: Glucose maintained within prescribed range  Description: INTERVENTIONS:  1. Monitor blood glucose as ordered  2. Assess for signs and symptoms of hyperglycemia and hypoglycemia  3. Administer ordered medications to maintain glucose within target range  4. Assess barriers to adequate nutritional intake and initiate nutrition consult as needed  5. Assess baseline knowledge and provide education as indicated  6. Monitor exercise as may reduce the requirements for insulin  Outcome: Progressing     Problem: Skin/Tissue Integrity - Adult  Goal: Skin integrity remains intact  Description: INTERVENTIONS  1. Assess and document risk factors for pressure ulcer development  2. Assess and document skin integrity  3. Monitor for areas of redness and/or skin breakdown  4. Initiate pressure ulcer prevention measures as indicated  Outcome: Progressing  Goal: Incisions, wounds, or drain sites healing without S/S of infection  Description: INTERVENTIONS  1. Assess and document risk factors for skin breakdown  2. Assess and document skin integrity  3. Assess and document dressing/incision, wound bed, drain sites and surrounding tissue  4. Implement wound care per orders  Outcome: Progressing  Goal: Oral mucous membranes remain intact  Description: INTERVENTIONS  1. Assess oral mucosa and hygiene practices  2. Implement preventative oral hygiene regimen  3. Implement oral  medicated treatments as ordered  Outcome: Progressing     Problem: Hematologic - Adult  Goal: Maintains hematologic stability  Description: INTERVENTIONS  1. Assess for signs and symptoms of bleeding or hemorrhage  2. Monitor labs  3. Administer supportive blood products/factors as ordered and appropriate  4. Administer medications as ordered  5. Initiate bleeding precautions as indicated  6. Educate patient/family to report signs/symptoms of bleeding  Outcome: Progressing     Problem: Musculoskeletal - Adult  Goal: Return mobility to safest level of function  Description: INTERVENTIONS:  1. Assess patient stability and activity tolerance for standing, transferring and ambulating w/ or w/o assistive devices  2. Assist with transfers and ambulation using safe practices  3. Ensure adequate protection for wounds/incisions during mobilization  4. Obtain PT/OT and other consults as needed  5. Apply Continuous Passive Motion per order to increase flexion toward goal  6. Instruct patient/family in ordered activity level  Outcome: Progressing  Goal: Maintain proper alignment of affected body part  Description: INTERVENTIONS:  1. Support and protect limb and body alignment per provider order  2. Instruct and reinforce with patient and family use of appropriate assistive device and precautions (e.g. spinal or hip dislocation precautions)  Outcome: Progressing  Goal: Return ADL status to a safe level of function  Description: INTERVENTIONS:  1. Assess patient's ADL deficits and provide assistive devices as needed  2. Obtain PT/OT consults as needed  3. Assist and instruct patient to increase activity and self care  Outcome: Progressing     Problem: Safety Adult - Fall  Goal: Free from fall injury  Description: INTERVENTIONS:    Inpatient - Please reference Cares/Safety Flowsheet under [place] Fall Risk for interventions.  Pediatrics - Please reference Peds Daily Cares/Safety Flowsheet under [place] Pediatric Fall Assessment  Fall Bundle for interventions  LD/OB - Please reference OB Shift Screening Flowsheet under OB Fall Risk for interventions.  Outcome: Progressing

## 2020-08-12 NOTE — INTERDISCIPLINARY/THERAPY
08/12/20 0846   Subjective Comments   Subjective Comments Pt having ORIF of R scapula today. PT will continue to treat once post-op orders have been received.    Plan   Plan Comment Need new post-op orders
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
## 2020-08-13 ENCOUNTER — APPOINTMENT (OUTPATIENT)
Dept: FLUOROSCOPY | Facility: HOSPITAL | Age: 35
DRG: 957 | End: 2020-08-13
Payer: COMMERCIAL

## 2020-08-13 ENCOUNTER — APPOINTMENT (OUTPATIENT)
Dept: RADIOLOGY | Facility: HOSPITAL | Age: 35
DRG: 957 | End: 2020-08-13
Payer: COMMERCIAL

## 2020-08-13 LAB
ANION GAP SERPL CALC-SCNC: 5 MMOL/L (ref 3–11)
BUN SERPL-MCNC: 7 MG/DL (ref 7–25)
CALCIUM SERPL-MCNC: 8.8 MG/DL (ref 8.6–10.3)
CHLORIDE SERPL-SCNC: 105 MMOL/L (ref 98–107)
CO2 SERPL-SCNC: 27 MMOL/L (ref 21–32)
CREAT SERPL-MCNC: 0.5 MG/DL (ref 0.6–1.1)
ERYTHROCYTE [DISTWIDTH] IN BLOOD BY AUTOMATED COUNT: 13.9 % (ref 11.5–14)
GFR SERPL CREATININE-BSD FRML MDRD: 127 ML/MIN/1.73M*2
GLUCOSE SERPL-MCNC: 103 MG/DL (ref 70–105)
HCT VFR BLD AUTO: 27 % (ref 34–45)
HGB BLD-MCNC: 9.3 G/DL (ref 11.5–15.5)
MAGNESIUM SERPL-MCNC: 1.7 MG/DL (ref 1.8–2.4)
MCH RBC QN AUTO: 32 PG (ref 28–33)
MCHC RBC AUTO-ENTMCNC: 34.4 G/DL (ref 32–36)
MCV RBC AUTO: 93 FL (ref 81–97)
PLATELET # BLD AUTO: 194 10*3/UL (ref 140–350)
PMV BLD AUTO: 7.4 FL (ref 6.9–10.8)
POTASSIUM SERPL-SCNC: 3.8 MMOL/L (ref 3.5–5.1)
RBC # BLD AUTO: 2.9 10*6/ΜL (ref 3.7–5.3)
SODIUM SERPL-SCNC: 137 MMOL/L (ref 135–145)
WBC # BLD AUTO: 10.3 10*3/UL (ref 4.5–10.5)

## 2020-08-13 PROCEDURE — 2500000200 HC RX 250 WO HCPCS: Performed by: NURSE ANESTHETIST, CERTIFIED REGISTERED

## 2020-08-13 PROCEDURE — 23585 OPTX SCAPULAR FX W/INT FIXJ: CPT | Performed by: ORTHOPAEDIC SURGERY

## 2020-08-13 PROCEDURE — 6360000200 HC RX 636 W HCPCS (ALT 250 FOR IP): Performed by: NURSE PRACTITIONER

## 2020-08-13 PROCEDURE — 6360000200 HC RX 636 W HCPCS (ALT 250 FOR IP)

## 2020-08-13 PROCEDURE — 83735 ASSAY OF MAGNESIUM: CPT | Performed by: NURSE PRACTITIONER

## 2020-08-13 PROCEDURE — 2580000300 HC RX 258: Performed by: NURSE PRACTITIONER

## 2020-08-13 PROCEDURE — 0PS504Z REPOSITION RIGHT SCAPULA WITH INTERNAL FIXATION DEVICE, OPEN APPROACH: ICD-10-PCS | Performed by: ORTHOPAEDIC SURGERY

## 2020-08-13 PROCEDURE — 36415 COLL VENOUS BLD VENIPUNCTURE: CPT | Performed by: NURSE PRACTITIONER

## 2020-08-13 PROCEDURE — (BLANK) HC RECOVERY PHASE-1 1ST  HOUR ACUITY LEVEL 3: Performed by: ORTHOPAEDIC SURGERY

## 2020-08-13 PROCEDURE — 1110000100 HC ROOM PRIVATE W TELE

## 2020-08-13 PROCEDURE — 6360000200 HC RX 636 W HCPCS (ALT 250 FOR IP): Mod: JW | Performed by: NURSE ANESTHETIST, CERTIFIED REGISTERED

## 2020-08-13 PROCEDURE — 99231 SBSQ HOSP IP/OBS SF/LOW 25: CPT | Performed by: PHYSICIAN ASSISTANT

## 2020-08-13 PROCEDURE — (BLANK) HC RECOVERY PHASE-1 EACH ADDITIONAL  1/2 HOUR ACUITY LEVEL 3: Performed by: ORTHOPAEDIC SURGERY

## 2020-08-13 PROCEDURE — (BLANK) HC OR LEVEL 3 PROC EACH ADDITIONAL MIN: Performed by: ORTHOPAEDIC SURGERY

## 2020-08-13 PROCEDURE — 2580000300 HC RX 258: Performed by: PHYSICIAN ASSISTANT

## 2020-08-13 PROCEDURE — 00450 ANES PX CLAV&SCAPULA NOS: CPT | Performed by: NURSE ANESTHETIST, CERTIFIED REGISTERED

## 2020-08-13 PROCEDURE — 6370000100 HC RX 637 (ALT 250 FOR IP): Performed by: NURSE PRACTITIONER

## 2020-08-13 PROCEDURE — 6370000100 HC RX 637 (ALT 250 FOR IP): Performed by: INTERNAL MEDICINE

## 2020-08-13 PROCEDURE — (BLANK) HC OR LEVEL 3 PROC 1ST 15MIN: Performed by: ORTHOPAEDIC SURGERY

## 2020-08-13 PROCEDURE — 2590000100 HC RX 259: Performed by: INTERNAL MEDICINE

## 2020-08-13 PROCEDURE — 6360000200 HC RX 636 W HCPCS (ALT 250 FOR IP): Performed by: INTERNAL MEDICINE

## 2020-08-13 PROCEDURE — 76000 FLUOROSCOPY <1 HR PHYS/QHP: CPT | Mod: NO READ

## 2020-08-13 PROCEDURE — (BLANK) HC GENERAL ANESTHESIA FACILITY CHARGE EACH ADDITIONAL MIN: Performed by: ORTHOPAEDIC SURGERY

## 2020-08-13 PROCEDURE — C1713 ANCHOR/SCREW BN/BN,TIS/BN: HCPCS | Performed by: ORTHOPAEDIC SURGERY

## 2020-08-13 PROCEDURE — 73030 X-RAY EXAM OF SHOULDER: CPT | Mod: RT

## 2020-08-13 PROCEDURE — 2590000100 HC RX 259: Performed by: ORTHOPAEDIC SURGERY

## 2020-08-13 PROCEDURE — 6360000200 HC RX 636 W HCPCS (ALT 250 FOR IP): Performed by: NURSE ANESTHETIST, CERTIFIED REGISTERED

## 2020-08-13 PROCEDURE — 80048 BASIC METABOLIC PNL TOTAL CA: CPT | Performed by: NURSE PRACTITIONER

## 2020-08-13 PROCEDURE — 85027 COMPLETE CBC AUTOMATED: CPT | Performed by: NURSE PRACTITIONER

## 2020-08-13 PROCEDURE — (BLANK) HC GENERAL ANESTHESIA FACILITY CHARGE 1ST 15 MIN: Performed by: ORTHOPAEDIC SURGERY

## 2020-08-13 DEVICE — IMPLANTABLE DEVICE: Type: IMPLANTABLE DEVICE | Site: SCAPULA | Status: FUNCTIONAL

## 2020-08-13 DEVICE — SCREW CORT 2.7 X 22MM SLF TAP W/T8 STARDRIVE RECESS: Type: IMPLANTABLE DEVICE | Site: SCAPULA | Status: FUNCTIONAL

## 2020-08-13 DEVICE — SCREW CORTEX SS 2.7MM X 12MM T8 STARDRIVE RECESS: Type: IMPLANTABLE DEVICE | Site: SCAPULA | Status: FUNCTIONAL

## 2020-08-13 DEVICE — SCREW CORT 2.7 X 10MM SLF TAP W/T25 STARDRIVE RECESS: Type: IMPLANTABLE DEVICE | Site: SCAPULA | Status: FUNCTIONAL

## 2020-08-13 DEVICE — SCREW CORT 2.7 X 24MM SLF TAP W/T8 STARDRIVE RECESS: Type: IMPLANTABLE DEVICE | Site: SCAPULA | Status: FUNCTIONAL

## 2020-08-13 DEVICE — SCREW CORTEX SS 2.7MM X 14MM T8 STARDRIVE RECESS: Type: IMPLANTABLE DEVICE | Site: SCAPULA | Status: FUNCTIONAL

## 2020-08-13 RX ORDER — ADHESIVE BANDAGE
30 BANDAGE TOPICAL DAILY PRN
Status: DISCONTINUED | OUTPATIENT
Start: 2020-08-13 | End: 2020-08-18 | Stop reason: HOSPADM

## 2020-08-13 RX ORDER — CEFAZOLIN SODIUM 1 G/3ML
INJECTION, POWDER, FOR SOLUTION INTRAMUSCULAR; INTRAVENOUS AS NEEDED
Status: DISCONTINUED | OUTPATIENT
Start: 2020-08-13 | End: 2020-08-13 | Stop reason: SURG

## 2020-08-13 RX ORDER — PROPOFOL 10 MG/ML
INJECTION, EMULSION INTRAVENOUS AS NEEDED
Status: DISCONTINUED | OUTPATIENT
Start: 2020-08-13 | End: 2020-08-13 | Stop reason: SURG

## 2020-08-13 RX ORDER — AMOXICILLIN 250 MG
1 CAPSULE ORAL 2 TIMES DAILY
Status: DISCONTINUED | OUTPATIENT
Start: 2020-08-13 | End: 2020-08-18 | Stop reason: HOSPADM

## 2020-08-13 RX ORDER — ONDANSETRON HYDROCHLORIDE 2 MG/ML
INJECTION, SOLUTION INTRAVENOUS AS NEEDED
Status: DISCONTINUED | OUTPATIENT
Start: 2020-08-13 | End: 2020-08-13 | Stop reason: SURG

## 2020-08-13 RX ORDER — HYDROMORPHONE HYDROCHLORIDE 2 MG/ML
INJECTION, SOLUTION INTRAMUSCULAR; INTRAVENOUS; SUBCUTANEOUS AS NEEDED
Status: DISCONTINUED | OUTPATIENT
Start: 2020-08-13 | End: 2020-08-13 | Stop reason: SURG

## 2020-08-13 RX ORDER — HYDROMORPHONE HYDROCHLORIDE 1 MG/ML
0.5 INJECTION, SOLUTION INTRAMUSCULAR; INTRAVENOUS; SUBCUTANEOUS EVERY 5 MIN PRN
Status: DISCONTINUED | OUTPATIENT
Start: 2020-08-13 | End: 2020-08-13 | Stop reason: HOSPADM

## 2020-08-13 RX ORDER — ONDANSETRON HYDROCHLORIDE 2 MG/ML
4 INJECTION, SOLUTION INTRAVENOUS ONCE AS NEEDED
Status: DISCONTINUED | OUTPATIENT
Start: 2020-08-13 | End: 2020-08-13 | Stop reason: HOSPADM

## 2020-08-13 RX ORDER — FENTANYL CITRATE/PF 50 MCG/ML
50 PLASTIC BAG, INJECTION (ML) INTRAVENOUS EVERY 5 MIN PRN
Status: DISCONTINUED | OUTPATIENT
Start: 2020-08-13 | End: 2020-08-13 | Stop reason: HOSPADM

## 2020-08-13 RX ORDER — HYDROMORPHONE HYDROCHLORIDE 1 MG/ML
INJECTION, SOLUTION INTRAMUSCULAR; INTRAVENOUS; SUBCUTANEOUS
Status: COMPLETED
Start: 2020-08-13 | End: 2020-08-13

## 2020-08-13 RX ORDER — ROCURONIUM BROMIDE 50 MG/5 ML
SYRINGE (ML) INTRAVENOUS AS NEEDED
Status: DISCONTINUED | OUTPATIENT
Start: 2020-08-13 | End: 2020-08-13 | Stop reason: SURG

## 2020-08-13 RX ORDER — LIDOCAINE HYDROCHLORIDE 20 MG/ML
INJECTION, SOLUTION EPIDURAL; INFILTRATION; INTRACAUDAL; PERINEURAL AS NEEDED
Status: DISCONTINUED | OUTPATIENT
Start: 2020-08-13 | End: 2020-08-13 | Stop reason: SURG

## 2020-08-13 RX ORDER — FENTANYL CITRATE/PF 50 MCG/ML
PLASTIC BAG, INJECTION (ML) INTRAVENOUS AS NEEDED
Status: DISCONTINUED | OUTPATIENT
Start: 2020-08-13 | End: 2020-08-13 | Stop reason: SURG

## 2020-08-13 RX ORDER — ESMOLOL HYDROCHLORIDE 10 MG/ML
INJECTION INTRAVENOUS AS NEEDED
Status: DISCONTINUED | OUTPATIENT
Start: 2020-08-13 | End: 2020-08-13 | Stop reason: SURG

## 2020-08-13 RX ADMIN — HYDROMORPHONE HYDROCHLORIDE 0.5 MG: 1 INJECTION, SOLUTION INTRAMUSCULAR; INTRAVENOUS; SUBCUTANEOUS at 14:28

## 2020-08-13 RX ADMIN — SODIUM CHLORIDE, POTASSIUM CHLORIDE, SODIUM LACTATE AND CALCIUM CHLORIDE 125 ML/HR: 600; 310; 30; 20 INJECTION, SOLUTION INTRAVENOUS at 15:16

## 2020-08-13 RX ADMIN — ESMOLOL HYDROCHLORIDE 30 MG: 10 INJECTION, SOLUTION INTRAVENOUS at 09:08

## 2020-08-13 RX ADMIN — HYDROMORPHONE HYDROCHLORIDE 0.4 MG: 2 INJECTION, SOLUTION INTRAMUSCULAR; INTRAVENOUS; SUBCUTANEOUS at 11:19

## 2020-08-13 RX ADMIN — FENTANYL CITRATE 50 MCG: 50 INJECTION, SOLUTION INTRAMUSCULAR; INTRAVENOUS at 20:06

## 2020-08-13 RX ADMIN — GABAPENTIN 300 MG: 300 CAPSULE ORAL at 20:17

## 2020-08-13 RX ADMIN — OXYCODONE HYDROCHLORIDE 10 MG: 5 TABLET ORAL at 03:40

## 2020-08-13 RX ADMIN — ONDANSETRON 4 MG: 2 INJECTION INTRAMUSCULAR; INTRAVENOUS at 12:23

## 2020-08-13 RX ADMIN — MAGNESIUM SULFATE HEPTAHYDRATE 3 G: 500 INJECTION, SOLUTION INTRAMUSCULAR; INTRAVENOUS at 08:58

## 2020-08-13 RX ADMIN — SODIUM CHLORIDE, POTASSIUM CHLORIDE, SODIUM LACTATE AND CALCIUM CHLORIDE 125 ML/HR: 600; 310; 30; 20 INJECTION, SOLUTION INTRAVENOUS at 23:21

## 2020-08-13 RX ADMIN — TAMSULOSIN HYDROCHLORIDE 0.4 MG: 0.4 CAPSULE ORAL at 17:25

## 2020-08-13 RX ADMIN — ESMOLOL HYDROCHLORIDE 30 MG: 10 INJECTION, SOLUTION INTRAVENOUS at 11:22

## 2020-08-13 RX ADMIN — LIDOCAINE HYDROCHLORIDE 60 MG: 20 INJECTION, SOLUTION EPIDURAL; INFILTRATION; INTRACAUDAL; PERINEURAL at 09:04

## 2020-08-13 RX ADMIN — HYDROMORPHONE HYDROCHLORIDE 0.5 MG: 1 INJECTION, SOLUTION INTRAMUSCULAR; INTRAVENOUS; SUBCUTANEOUS at 14:54

## 2020-08-13 RX ADMIN — FENTANYL CITRATE 50 MCG: 50 INJECTION, SOLUTION INTRAMUSCULAR; INTRAVENOUS at 22:33

## 2020-08-13 RX ADMIN — ACETAMINOPHEN 650 MG: 325 TABLET ORAL at 17:25

## 2020-08-13 RX ADMIN — ACETAMINOPHEN 650 MG: 325 TABLET ORAL at 23:22

## 2020-08-13 RX ADMIN — FENTANYL CITRATE 100 MCG: 0.05 INJECTION, SOLUTION INTRAMUSCULAR; INTRAVENOUS at 09:54

## 2020-08-13 RX ADMIN — FENTANYL CITRATE 50 MCG: 0.05 INJECTION, SOLUTION INTRAMUSCULAR; INTRAVENOUS at 09:36

## 2020-08-13 RX ADMIN — Medication 40 MG: at 09:04

## 2020-08-13 RX ADMIN — PROPOFOL 150 MG: 10 INJECTION, EMULSION INTRAVENOUS at 09:04

## 2020-08-13 RX ADMIN — HYDROMORPHONE HYDROCHLORIDE 0.2 MG: 2 INJECTION, SOLUTION INTRAMUSCULAR; INTRAVENOUS; SUBCUTANEOUS at 12:39

## 2020-08-13 RX ADMIN — CEFAZOLIN SODIUM 2 G: 1 INJECTION, POWDER, FOR SOLUTION INTRAMUSCULAR; INTRAVENOUS at 09:40

## 2020-08-13 RX ADMIN — SODIUM CHLORIDE, POTASSIUM CHLORIDE, SODIUM LACTATE AND CALCIUM CHLORIDE: 600; 310; 30; 20 INJECTION, SOLUTION INTRAVENOUS at 12:23

## 2020-08-13 RX ADMIN — OXYCODONE HYDROCHLORIDE 5 MG: 5 TABLET ORAL at 17:25

## 2020-08-13 RX ADMIN — DOCUSATE SODIUM 50MG AND SENNOSIDES 8.6MG 1 TABLET: 8.6; 5 TABLET, FILM COATED ORAL at 20:17

## 2020-08-13 RX ADMIN — ESMOLOL HYDROCHLORIDE 30 MG: 10 INJECTION, SOLUTION INTRAVENOUS at 11:57

## 2020-08-13 RX ADMIN — HYDROMORPHONE HYDROCHLORIDE 0.4 MG: 2 INJECTION, SOLUTION INTRAMUSCULAR; INTRAVENOUS; SUBCUTANEOUS at 11:38

## 2020-08-13 RX ADMIN — HYDROMORPHONE HYDROCHLORIDE 0.2 MG: 2 INJECTION, SOLUTION INTRAMUSCULAR; INTRAVENOUS; SUBCUTANEOUS at 12:25

## 2020-08-13 RX ADMIN — HYDROMORPHONE HYDROCHLORIDE 0.4 MG: 2 INJECTION, SOLUTION INTRAMUSCULAR; INTRAVENOUS; SUBCUTANEOUS at 10:21

## 2020-08-13 RX ADMIN — FENTANYL CITRATE 100 MCG: 0.05 INJECTION, SOLUTION INTRAMUSCULAR; INTRAVENOUS at 09:04

## 2020-08-13 RX ADMIN — FENTANYL CITRATE 50 MCG: 50 INJECTION, SOLUTION INTRAMUSCULAR; INTRAVENOUS at 16:36

## 2020-08-13 RX ADMIN — SUGAMMADEX 200 MG: 100 INJECTION, SOLUTION INTRAVENOUS at 13:01

## 2020-08-13 RX ADMIN — FENTANYL CITRATE 50 MCG: 50 INJECTION, SOLUTION INTRAMUSCULAR; INTRAVENOUS at 23:49

## 2020-08-13 RX ADMIN — Medication 8.6 MG: at 20:17

## 2020-08-13 RX ADMIN — OXYCODONE HYDROCHLORIDE 10 MG: 5 TABLET ORAL at 21:27

## 2020-08-13 RX ADMIN — HYDROMORPHONE HYDROCHLORIDE 0.4 MG: 2 INJECTION, SOLUTION INTRAMUSCULAR; INTRAVENOUS; SUBCUTANEOUS at 11:03

## 2020-08-13 RX ADMIN — MAGNESIUM SULFATE HEPTAHYDRATE 3 G: 500 INJECTION, SOLUTION INTRAMUSCULAR; INTRAVENOUS at 08:40

## 2020-08-13 ASSESSMENT — PAIN DESCRIPTION - DESCRIPTORS: DESCRIPTORS: ACHING;SHOOTING

## 2020-08-13 ASSESSMENT — ACTIVITIES OF DAILY LIVING (ADL): ADEQUATE_TO_COMPLETE_ADL: YES

## 2020-08-13 NOTE — BRIEF OP NOTE
Brief Op Note:    Nabila INIGUEZ Booth  8/10/2020 - 8/13/2020    Pre-op Diagnosis     * Right scapula fracture [S42.101A]       Post-op Diagnosis     * Right scapula fracture [S42.101A]    Procedures:    * OPEN REDUCTION INTERNAL FIXATION RIGHT SCAPULA    Surgeon(s):  Cecilio Renteria MD    Anesthesia:  Anesthesiologist: AUGUSTO Wills MD  CRNA: Rick Ga CRNA; Mehran Solis CRNA  General      Pain Block:      Staff:   Circulator: Madelyn Wagner RN  Radiology Technologist: RT Julissa  Relief Circulator: Tremaine Mancini RN  Relief Scrub: Stephie Serna  Scrub Person: Michelle Rogers  First Assist: Jurgen Rios; Sonal Vazquez RN    Estimated Blood Loss:  No blood loss documented.    Specimens:  No specimens collected during this procedure.      Drains:  * No LDAs found *    Complications:      Cecilio Renteria MD  Phone Number: [phone]    Date: 8/13/2020  Time: 12:52 PM

## 2020-08-13 NOTE — INTERDISCIPLINARY/THERAPY
08/13/20 1215   Subjective Comments   Subjective Comments Pt not seen on this date d/t ORIF of R scapula; will require new orders post op; will continue to follow.

## 2020-08-13 NOTE — PERIOPERATIVE NURSING NOTE
Attempted to update mother Yojana via phone on the procedure progress.  No answer at this time.  Will continue to attempt her via phone throughout the procedure.

## 2020-08-13 NOTE — NURSING END OF SHIFT
Nursing End of Shift Summary:    Patient: Nabila Booth  MRN: [id number]  : 1985, Age: 34 y.o.    Location: [address] [place] [address]    Nursing Goals  Clinical Goals for the Shift: Maintain safety, manage pain, promote rest.     Narrative Summary of Progress Toward Clinical Goals:  Patient remained safe all shift with good pain management of PRN medications. Patient slept most of shift. Patient more alert compared to previous night shift. Patient able to void without needing bladder scans, or straight cath. Did have some bloody discharge from vagina, possible menstrual cycle.     Barriers to Goals/Nursing Concerns:  Yes    New Patient or Family Concerns/Issues:  No  Shift Summary:      Significant Events & Communications to Providers (last 12 hours)      Last 5 Values    No documentation.              Oxygen Usage (last 12 hours)      Last 5 Values     Row Name 20                   Oxygen Weaning Trial by Nursing    Is Patient on Room Air OR on the Same Amount of O2 as at Home?  No                   Mobility (last 12 hours)      Last 5 Values     Row Name 20                   Mobility    Anti-Embolism Devices  Bilateral;AE calf pump        Anti-Embolism Intervention  On            Urethral Catheter    Active Urethral Catheter     None            Active Lines    Active Central venous catheter / Peripherally inserted central catheter / Implantable Port / Hemodialysis catheter / Midline Catheter     None              Infusing Medications   Medication Dose Last Rate   • LR  125 mL/hr 125 mL/hr (20 0505)   • LR  100 mL/hr       PRN Medications   Medication Dose Last Dose   • fentaNYL citrate (PF)  50 mcg 50 mcg at 20 2207   • oxyCODONE  5-10 mg 10 mg at 20 0340   • sodium chloride  3 mL     • ondansetron  4 mg 4 mg at 20 1956    Or   • ondansetron  4 mg 4 mg at 08/10/20 2223   • sodium chloride 0.9% (NS)  25-50 mL       _________________________  Samia Herr RN  20 5:40  AM

## 2020-08-13 NOTE — PERIOPERATIVE NURSING NOTE
0820: Met patient in [facility], no family at bedside.  Patient requested that staff update her mother throughout the procedure and at completion.     955: Updated mother Yojana, via phone at the start of the procedure.  Informed her that staff would continue to update her every hour that the patient is in the procedure and that the surgeon will update her via phone at the completion of the procedure.
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
## 2020-08-13 NOTE — ANESTHESIA POSTPROCEDURE EVALUATION
Patient: Nabila Booth    Procedure Summary     Date:  08/13/20 Room / Location:  [facility] OR  / [facility] OR    Anesthesia Start:  0858 Anesthesia Stop:  1314    Procedure:  OPEN REDUCTION INTERNAL FIXATION RIGHT SCAPULA (Right Shoulder) Diagnosis:       Right scapula fracture      (Right scapula fracture [S42.101A])    Surgeon:  Cecilio Renteria MD Responsible Provider:  AUGUSTO Wills MD    Anesthesia Type:  general ASA Status:  2          Anesthesia Type: general    Last vitals  Vitals Value Taken Time   /81 8/13/2020  2:30 PM   Temp 36.3 °C (97.3 °F) 8/13/2020  2:30 PM   Pulse 123 8/13/2020  1:50 PM   Resp 24 8/13/2020  1:50 PM   SpO2 96 % 8/13/2020  2:30 PM   Pain Score 6 8/13/2020  2:28 PM       Anesthesia Post Evaluation    Patient location during evaluation: PACU  Patient participation: complete - patient participated  Level of consciousness: awake and alert  Pain management: adequate  Airway patency: patent  Anesthetic complications: no  Cardiovascular status: acceptable  Respiratory status: acceptable  Hydration status: acceptable  May dismiss recovered patient based on consultation with the appropriate physicians and/or meeting appropriate discharge criteria      Cosmetic?

## 2020-08-13 NOTE — FAX COVER SHEET
Fax Transmission  ----------------------------------------------------------------------------------------------------------------------  Facility Name:  [facility]  Address:   [address] [place] [address], Zip:  [place]   [address]  Tax ID:   [id number]  NPI:    [id number]      Attention: UR        Comments: [facility] has become [facility]: Find out more at [email or web address]      Insurance: [facility]  Auth/Cert Number: pending  Fax:[phone]        Please call with any questions.        Thanks,         Lizzie Lindo RN, Case Management- Utilization Review  [address] [place] [address]  p:  [phone]    f: [phone]    e: [email or web address]    This facsimile message is CONFIDENTIAL and may contain -privileged information and/or Protected Health Information (PHI) as defined in the federal Health Insurance Portability and Accountability Act, as amended.  This facsimile is  intended ONLY for the use of the individual or company named.  If the reader is NOT the intended recipient, or the employee or agent responsible to deliver it to the intended recipient, you are hereby notified that any dissemination, distribution, or copying of this communication is prohibited.  If you have received this communication in error, please immediately notify us by telephone so that we may arrange for the return of the original message.
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
## 2020-08-13 NOTE — PERIOPERATIVE NURSING NOTE
At end procedure xeroform 4x4s and tegaderm applied to laceration to left side of patient's neck.  Bacitracin applied to abrasions on the center of the chest between breast.

## 2020-08-13 NOTE — INTERDISCIPLINARY/THERAPY
08/13/20 0845   General   Chart Reviewed Yes   Missed Time Reason Surgery   Subjective Comments   Subjective Comments Pt scheduled to OR today for OPEN REDUCTION INTERNAL FIXATION RIGHT SCAPULA . PT will f/u as able and appropriate and kindly request p-op orders.

## 2020-08-13 NOTE — PLAN OF CARE
Problem: Knowledge Deficit  Goal: Patient/family/caregiver demonstrates understanding of disease process, treatment plan, medications, and discharge instructions  Description: INTERVENTIONS:   1. Complete learning assessment and assess knowledge base  2. Provide teaching at level of understanding   3. Provide teaching via preferred learning methods  Outcome: Progressing  Flowsheets (Taken 8/11/2020 2201 by Samia Herr RN)  Patient/family/caregiver demonstrates understanding of disease process, treatment plan, medications, and discharge instructions:   Complete learning assessment and assess knowledge base   Provide teaching at level of understanding   Provide teaching via preferred learning methods     Problem: Potential for Compromised Skin Integrity  Goal: Skin Integrity is Maintained or Improved  Description: INTERVENTIONS:  1. Assess and monitor skin integrity  2. Collaborate with interdisciplinary team and initiate plans and interventions as needed  3. Alternate a full bath with partial baths for elderly   4. Monitor patient's hygiene practices   5. Collaborate with wound, ostomy, and continence nurse  Outcome: Progressing  Flowsheets (Taken 8/11/2020 2201 by Samia Herr RN)  Skin integrity is maintained or improved:   Assess and monitor skin integrity   Collaborate with interdisciplinary team and initiate plans and interventions as needed   Alternate a full bath with partial baths for elderly   Monitor patient's hygiene practices   Collaborate with wound, ostomy, and continence nurse  Goal: Nutritional status is improving  Description: INTERVENTIONS:  1. Monitor and assess patient for malnutrition (ex- brittle hair, bruises, dry skin, pale skin and conjunctiva, muscle wasting, smooth red tongue, and disorientation)  2. Monitor patient's weight and dietary intake as ordered or per policy  3. Determine patient's food preferences and provide high-protein, high-caloric foods as appropriate  4. Assist  patient with eating   5. Allow adequate time for meals   6. Encourage patient to take dietary supplement as ordered   7. Collaborate with dietitian  8. Include patient/family/caregiver in decisions related to nutrition  Outcome: Progressing  Flowsheets (Taken 8/11/2020 2201 by Samia Herr RN)  Nutritional status is improving:   Monitor and assess patient for malnutrition (ex- brittle hair, bruises, dry skin, pale skin and conjunctiva, muscle wasting, smooth red tongue, and disorientation)   Monitor patient's weight and dietary intake as ordered or per policy   Determine patient's food preferences and provide high-protein, high-caloric foods as appropriate   Assist patient with eating   Allow adequate time for meals   Encourage patient to take dietary supplement as ordered   Collaborate with dietitian   Include patient/family/caregiver in decisions related to nutrition  Goal: MOBILITY IS MAINTAINED OR IMPROVED  Description: INTERVENTIONS  1. Collaborate with interdisciplinary team and initiate plan and interventions as ordered (PT/OT)  2. Encourage ambulation  3. Up to chair for meals  4. Monitor for signs of deconditioning  Outcome: Progressing  Flowsheets (Taken 8/11/2020 2201 by Samia Herr RN)  Mobility is Maintained or Improved:   Collaborate with interdisciplinary team and initiate plan and interventions as ordered (PT/OT)   Encourage ambulation   Up to chair for meals   Monitor for signs of deconditioning     Problem: Respiratory - Adult  Goal: Achieves optimal ventilation and oxygenation  Description: INTERVENTIONS:  1. Assess for changes in respiratory status  2. Assess for changes in mentation and behavior  3. Position to facilitate oxygenation and minimize respiratory effort  4. Oxygen supplementation based on oxygen saturation or ABGs  5. Assess patient's ability to cough effectively  6. Encourage broncho-pulmonary hygiene including cough, deep breathe  7. Assess the need for suctioning   8.  Assess and instruct to report SOB or any respiratory difficulty  9. Respiratory Therapy support as indicated, including medications and treatment.  Outcome: Progressing  Flowsheets (Taken 8/11/2020 2201 by Samia Herr RN)  Achieves optimal ventilation and oxygenation:   Assess for changes in respiratory status   Assess for changes in mentation and behavior   Position to facilitate oxygenation and minimize respiratory effort   Oxygen supplementation based on oxygen saturation or arterial blood gases   Assess patient's ability to cough effectively   Encourage broncho-pulmonary hygiene including cough, deep breathe   Assess the need for suctioning   Assess and instruct to report shortness of breath or any respiratory difficulty   Respiratory Therapy support as indicated, including medications and treatment     Problem: Genitourinary - Adult  Goal: Absence of urinary retention  Description: INTERVENTIONS:  1. Assess patient’s ability to void and empty bladder.  2. Monitor intake/output and perform bladder scan if indicated.  3. Place urinary catheter per order and initiate and maintain CAUTI bundle.  4. Administer medications to alleviate retention as needed.  5. Discuss catheterization for long term situations as appropriate.    Outcome: Progressing  Flowsheets (Taken 8/13/2020 0710)  Absence of urinary retention:   Assess patient’s ability to void and empty bladder   Discuss catheterization for long term situations as appropriate   Place urinary catheter per order and initiate and maintain CAUTI bundle.   Monitor intake/output and perform bladder scan if indicated   Administer medications to alleviate retention as needed  Goal: Absence of Urinary Infection  Description: INTERVENTIONS:  1. Assess urinary status for burning, urgency, frequency, and pain  2. Assess urine for color, cloudiness, odor, and amount  3. Monitor intake/output  4. Monitor lab values  5. Obtain urinalysis as ordered  6. Assess for  neurological status changes    Outcome: Progressing  Flowsheets (Taken 8/13/2020 0710)  Absence of Urinary Infection:   Assess urinary status for burning, urgency, frequency, and pain   Monitor intake/output   Assess urine for color, cloudiness, odor, and amount     Problem: Hematologic - Adult  Goal: Maintains hematologic stability  Description: INTERVENTIONS  1. Assess for signs and symptoms of bleeding or hemorrhage  2. Monitor labs  3. Administer supportive blood products/factors as ordered and appropriate  4. Administer medications as ordered  5. Initiate bleeding precautions as indicated  6. Educate patient/family to report signs/symptoms of bleeding  Outcome: Progressing  Flowsheets (Taken 8/13/2020 0710)  Maintains hematologic stability:   Assess for signs and symptoms of bleeding or hemorrhage   Initiate bleeding precautions as indicated   Monitor labs   [place] medications as ordered     Problem: Safety Adult - Fall  Goal: Free from fall injury  Description: INTERVENTIONS:    Inpatient - Please reference Cares/Safety Flowsheet under [place] Fall Risk for interventions.  Pediatrics - Please reference Peds Daily Cares/Safety Flowsheet under [facility] Pediatric Fall Assessment Fall Bundle for interventions  LD/OB - Please reference OB Shift Screening Flowsheet under OB Fall Risk for interventions.  Outcome: Progressing
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
## 2020-08-13 NOTE — PERIOPERATIVE NURSING NOTE
Mother updated via phone. Dr. Epps contacted re: tachycardia. Will continue to watch for another 20 minutes. If no change will transfer to room. Right arm positioned on pillow. Patient upright in bed.

## 2020-08-13 NOTE — PERIOPERATIVE NURSING NOTE
Attempted to update mother Yojana via phone on the procedure progress.  Will continue to attempt to update via phone throughout the procedure.

## 2020-08-14 ENCOUNTER — ANCILLARY PROCEDURE (OUTPATIENT)
Dept: ULTRASOUND IMAGING | Facility: HOSPITAL | Age: 35
DRG: 957 | End: 2020-08-14
Payer: COMMERCIAL

## 2020-08-14 LAB
ANION GAP SERPL CALC-SCNC: 6 MMOL/L (ref 3–11)
BASOPHILS # BLD AUTO: 0.1 10*3/UL
BASOPHILS NFR BLD AUTO: 0 % (ref 0–2)
BUN SERPL-MCNC: 5 MG/DL (ref 7–25)
CALCIUM SERPL-MCNC: 8.5 MG/DL (ref 8.6–10.3)
CHLORIDE SERPL-SCNC: 102 MMOL/L (ref 98–107)
CO2 SERPL-SCNC: 26 MMOL/L (ref 21–32)
CREAT SERPL-MCNC: 0.52 MG/DL (ref 0.6–1.1)
EOSINOPHIL # BLD AUTO: 0.1 10*3/UL
EOSINOPHIL NFR BLD AUTO: 1 % (ref 0–3)
ERYTHROCYTE [DISTWIDTH] IN BLOOD BY AUTOMATED COUNT: 13.7 % (ref 11.5–14)
GFR SERPL CREATININE-BSD FRML MDRD: 126 ML/MIN/1.73M*2
GLUCOSE SERPL-MCNC: 113 MG/DL (ref 70–105)
HCT VFR BLD AUTO: 26.4 % (ref 34–45)
HGB BLD-MCNC: 9.2 G/DL (ref 11.5–15.5)
LYMPHOCYTES # BLD AUTO: 0.9 10*3/UL
LYMPHOCYTES NFR BLD AUTO: 7 % (ref 11–47)
MAGNESIUM SERPL-MCNC: 1.8 MG/DL (ref 1.8–2.4)
MCH RBC QN AUTO: 32.4 PG (ref 28–33)
MCHC RBC AUTO-ENTMCNC: 34.8 G/DL (ref 32–36)
MCV RBC AUTO: 93.1 FL (ref 81–97)
MONOCYTES # BLD AUTO: 0.8 10*3/UL
MONOCYTES NFR BLD AUTO: 7 % (ref 3–11)
NEUTROPHILS # BLD AUTO: 10.8 10*3/UL
NEUTROPHILS NFR BLD AUTO: 85 % (ref 41–81)
PLATELET # BLD AUTO: 211 10*3/UL (ref 140–350)
PMV BLD AUTO: 7.2 FL (ref 6.9–10.8)
POTASSIUM SERPL-SCNC: 3.9 MMOL/L (ref 3.5–5.1)
RBC # BLD AUTO: 2.83 10*6/ΜL (ref 3.7–5.3)
SODIUM SERPL-SCNC: 134 MMOL/L (ref 135–145)
WBC # BLD AUTO: 12.7 10*3/UL (ref 4.5–10.5)

## 2020-08-14 PROCEDURE — 1110000100 HC ROOM PRIVATE W TELE

## 2020-08-14 PROCEDURE — 85025 COMPLETE CBC W/AUTO DIFF WBC: CPT | Performed by: ORTHOPAEDIC SURGERY

## 2020-08-14 PROCEDURE — 97530 THERAPEUTIC ACTIVITIES: CPT | Mod: GP

## 2020-08-14 PROCEDURE — 2580000300 HC RX 258: Performed by: PHYSICIAN ASSISTANT

## 2020-08-14 PROCEDURE — 99024 POSTOP FOLLOW-UP VISIT: CPT | Performed by: PHYSICIAN ASSISTANT

## 2020-08-14 PROCEDURE — 6370000100 HC RX 637 (ALT 250 FOR IP): Performed by: PHYSICIAN ASSISTANT

## 2020-08-14 PROCEDURE — (BLANK) HC ROOM PRIVATE

## 2020-08-14 PROCEDURE — 97110 THERAPEUTIC EXERCISES: CPT | Mod: GP

## 2020-08-14 PROCEDURE — 80048 BASIC METABOLIC PNL TOTAL CA: CPT | Performed by: NURSE PRACTITIONER

## 2020-08-14 PROCEDURE — 97530 THERAPEUTIC ACTIVITIES: CPT | Mod: GO | Performed by: OCCUPATIONAL THERAPIST

## 2020-08-14 PROCEDURE — 6360000200 HC RX 636 W HCPCS (ALT 250 FOR IP): Performed by: INTERNAL MEDICINE

## 2020-08-14 PROCEDURE — 6370000100 HC RX 637 (ALT 250 FOR IP): Performed by: NURSE PRACTITIONER

## 2020-08-14 PROCEDURE — 2590000100 HC RX 259: Performed by: PHYSICIAN ASSISTANT

## 2020-08-14 PROCEDURE — 6360000200 HC RX 636 W HCPCS (ALT 250 FOR IP): Performed by: NURSE PRACTITIONER

## 2020-08-14 PROCEDURE — 76937 US GUIDE VASCULAR ACCESS: CPT

## 2020-08-14 PROCEDURE — 36415 COLL VENOUS BLD VENIPUNCTURE: CPT | Performed by: ORTHOPAEDIC SURGERY

## 2020-08-14 PROCEDURE — 6360000200 HC RX 636 W HCPCS (ALT 250 FOR IP): Performed by: PHYSICIAN ASSISTANT

## 2020-08-14 PROCEDURE — 83735 ASSAY OF MAGNESIUM: CPT | Performed by: NURSE PRACTITIONER

## 2020-08-14 PROCEDURE — 2580000300 HC RX 258: Performed by: NURSE PRACTITIONER

## 2020-08-14 PROCEDURE — 2590000100 HC RX 259: Performed by: ORTHOPAEDIC SURGERY

## 2020-08-14 PROCEDURE — 2590000100 HC RX 259: Performed by: INTERNAL MEDICINE

## 2020-08-14 PROCEDURE — 97110 THERAPEUTIC EXERCISES: CPT | Mod: GO | Performed by: OCCUPATIONAL THERAPIST

## 2020-08-14 PROCEDURE — 99231 SBSQ HOSP IP/OBS SF/LOW 25: CPT | Performed by: PHYSICIAN ASSISTANT

## 2020-08-14 PROCEDURE — 6370000100 HC RX 637 (ALT 250 FOR IP): Performed by: INTERNAL MEDICINE

## 2020-08-14 RX ORDER — MORPHINE SULFATE 1 MG/ML
INJECTION INTRAVENOUS CONTINUOUS
Status: DISCONTINUED | OUTPATIENT
Start: 2020-08-14 | End: 2020-08-17

## 2020-08-14 RX ORDER — CYCLOBENZAPRINE HCL 10 MG
10 TABLET ORAL 3 TIMES DAILY PRN
Status: DISCONTINUED | OUTPATIENT
Start: 2020-08-14 | End: 2020-08-18 | Stop reason: HOSPADM

## 2020-08-14 RX ORDER — DOCUSATE SODIUM 100 MG/1
200 CAPSULE, LIQUID FILLED ORAL 2 TIMES DAILY
Status: DISCONTINUED | OUTPATIENT
Start: 2020-08-14 | End: 2020-08-18 | Stop reason: HOSPADM

## 2020-08-14 RX ORDER — NALOXONE HYDROCHLORIDE 0.4 MG/ML
0.2 INJECTION, SOLUTION INTRAMUSCULAR; INTRAVENOUS; SUBCUTANEOUS AS NEEDED
Status: DISCONTINUED | OUTPATIENT
Start: 2020-08-14 | End: 2020-08-18 | Stop reason: HOSPADM

## 2020-08-14 RX ORDER — KETOROLAC TROMETHAMINE 15 MG/ML
15 INJECTION, SOLUTION INTRAMUSCULAR; INTRAVENOUS EVERY 6 HOURS SCHEDULED
Status: COMPLETED | OUTPATIENT
Start: 2020-08-14 | End: 2020-08-17

## 2020-08-14 RX ADMIN — OXYCODONE HYDROCHLORIDE 10 MG: 5 TABLET ORAL at 02:10

## 2020-08-14 RX ADMIN — OXYCODONE HYDROCHLORIDE 5 MG: 5 TABLET ORAL at 13:19

## 2020-08-14 RX ADMIN — DOCUSATE SODIUM 200 MG: 100 CAPSULE, LIQUID FILLED ORAL at 20:47

## 2020-08-14 RX ADMIN — SODIUM CHLORIDE 25 ML: 9 INJECTION, SOLUTION INTRAVENOUS at 09:45

## 2020-08-14 RX ADMIN — Medication 8.6 MG: at 20:47

## 2020-08-14 RX ADMIN — OXYCODONE HYDROCHLORIDE 10 MG: 5 TABLET ORAL at 06:23

## 2020-08-14 RX ADMIN — SODIUM CHLORIDE, POTASSIUM CHLORIDE, SODIUM LACTATE AND CALCIUM CHLORIDE 125 ML/HR: 600; 310; 30; 20 INJECTION, SOLUTION INTRAVENOUS at 07:40

## 2020-08-14 RX ADMIN — LIDOCAINE 1 PATCH: 560 PATCH PERCUTANEOUS; TOPICAL; TRANSDERMAL at 08:38

## 2020-08-14 RX ADMIN — GABAPENTIN 300 MG: 300 CAPSULE ORAL at 08:37

## 2020-08-14 RX ADMIN — DOCUSATE SODIUM 50MG AND SENNOSIDES 8.6MG 1 TABLET: 8.6; 5 TABLET, FILM COATED ORAL at 08:37

## 2020-08-14 RX ADMIN — KETOROLAC TROMETHAMINE 15 MG: 15 INJECTION, SOLUTION INTRAMUSCULAR; INTRAVENOUS at 13:18

## 2020-08-14 RX ADMIN — TAMSULOSIN HYDROCHLORIDE 0.4 MG: 0.4 CAPSULE ORAL at 17:46

## 2020-08-14 RX ADMIN — DOCUSATE SODIUM 50MG AND SENNOSIDES 8.6MG 1 TABLET: 8.6; 5 TABLET, FILM COATED ORAL at 20:47

## 2020-08-14 RX ADMIN — DOCUSATE SODIUM 200 MG: 100 CAPSULE, LIQUID FILLED ORAL at 08:37

## 2020-08-14 RX ADMIN — Medication 8.6 MG: at 08:37

## 2020-08-14 RX ADMIN — POLYETHYLENE GLYCOL 3350 17 G: 17 POWDER, FOR SOLUTION ORAL at 08:37

## 2020-08-14 RX ADMIN — ACETAMINOPHEN 650 MG: 325 TABLET ORAL at 05:01

## 2020-08-14 RX ADMIN — CYCLOBENZAPRINE 10 MG: 10 TABLET, FILM COATED ORAL at 08:37

## 2020-08-14 RX ADMIN — MORPHINE SULFATE: 1 INJECTION INTRAVENOUS at 09:46

## 2020-08-14 RX ADMIN — ACETAMINOPHEN 650 MG: 325 TABLET ORAL at 13:18

## 2020-08-14 RX ADMIN — FENTANYL CITRATE 50 MCG: 50 INJECTION, SOLUTION INTRAMUSCULAR; INTRAVENOUS at 01:07

## 2020-08-14 RX ADMIN — ENOXAPARIN SODIUM 40 MG: 40 INJECTION SUBCUTANEOUS at 13:18

## 2020-08-14 RX ADMIN — KETOROLAC TROMETHAMINE 15 MG: 15 INJECTION, SOLUTION INTRAMUSCULAR; INTRAVENOUS at 08:38

## 2020-08-14 RX ADMIN — FENTANYL CITRATE 50 MCG: 50 INJECTION, SOLUTION INTRAMUSCULAR; INTRAVENOUS at 04:58

## 2020-08-14 RX ADMIN — ACETAMINOPHEN 650 MG: 325 TABLET ORAL at 17:46

## 2020-08-14 RX ADMIN — FENTANYL CITRATE 50 MCG: 50 INJECTION, SOLUTION INTRAMUSCULAR; INTRAVENOUS at 03:15

## 2020-08-14 RX ADMIN — GABAPENTIN 300 MG: 300 CAPSULE ORAL at 15:00

## 2020-08-14 RX ADMIN — GABAPENTIN 300 MG: 300 CAPSULE ORAL at 20:47

## 2020-08-14 RX ADMIN — FENTANYL CITRATE 50 MCG: 50 INJECTION, SOLUTION INTRAMUSCULAR; INTRAVENOUS at 07:37

## 2020-08-14 RX ADMIN — KETOROLAC TROMETHAMINE 15 MG: 15 INJECTION, SOLUTION INTRAMUSCULAR; INTRAVENOUS at 17:46

## 2020-08-14 ASSESSMENT — PAIN DESCRIPTION - DESCRIPTORS: DESCRIPTORS: ACHING;THROBBING

## 2020-08-14 NOTE — PLAN OF CARE
Problem: Inadequate Oral Intake (2.1)  Description: Etiology: AMS  Signs/Symptoms:   poor intakes since admit  Goal: Food and/or Nutrient Delivery (ND)  Description: Intakes at least 75% during admit, wt stable  Outcome: Not Progressing- new interventions  Intervnetions  Meals and Snacks (ND-1): General/healthful diet  Nutrition Supplement Therapy (ND-3): Medical Food Supplement Therapy
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
## 2020-08-14 NOTE — PLAN OF CARE
Problem: Knowledge Deficit  Goal: Patient/family/caregiver demonstrates understanding of disease process, treatment plan, medications, and discharge instructions  Description: INTERVENTIONS:   1. Complete learning assessment and assess knowledge base  2. Provide teaching at level of understanding   3. Provide teaching via preferred learning methods  Outcome: Not Progressing     Problem: Potential for Compromised Skin Integrity  Goal: Skin Integrity is Maintained or Improved  Description: INTERVENTIONS:  1. Assess and monitor skin integrity  2. Collaborate with interdisciplinary team and initiate plans and interventions as needed  3. Alternate a full bath with partial baths for elderly   4. Monitor patient's hygiene practices   5. Collaborate with wound, ostomy, and continence nurse  Outcome: Not Progressing  Goal: Nutritional status is improving  Description: INTERVENTIONS:  1. Monitor and assess patient for malnutrition (ex- brittle hair, bruises, dry skin, pale skin and conjunctiva, muscle wasting, smooth red tongue, and disorientation)  2. Monitor patient's weight and dietary intake as ordered or per policy  3. Determine patient's food preferences and provide high-protein, high-caloric foods as appropriate  4. Assist patient with eating   5. Allow adequate time for meals   6. Encourage patient to take dietary supplement as ordered   7. Collaborate with dietitian  8. Include patient/family/caregiver in decisions related to nutrition  Outcome: Not Progressing  Goal: MOBILITY IS MAINTAINED OR IMPROVED  Description: INTERVENTIONS  1. Collaborate with interdisciplinary team and initiate plan and interventions as ordered (PT/OT)  2. Encourage ambulation  3. Up to chair for meals  4. Monitor for signs of deconditioning  Outcome: Not Progressing     Problem: Neurosensory - Adult  Goal: Achieves stable or improved neurological status  Description: INTERVENTIONS  1. Assess for and report changes in neurological  status  2. Initiate measures to prevent increased intracranial pressure  3. Maintain blood pressure and fluid volume within ordered parameters to optimize cerebral perfusion and minimize risk of hemorrhage  4. Monitor temperature, glucose, and sodium. Initiate appropriate interventions as ordered  Outcome: Not Progressing  Goal: Absence of seizures  Description: INTERVENTIONS  1. Monitor for seizure activity  2. Administer anti-seizure medications as ordered  3. Monitor neurological status  4. Assist patient in avoiding triggers, if identified  Outcome: Not Progressing  Goal: Remains free of injury related to seizures activity  Description: INTERVENTIONS  1. Maintain airway, patient safety  and administer oxygen as ordered  2. Monitor patient for seizure activity, document and report duration and description of seizure to provider  3. If seizure occurs, turn patient to side and suction secretions as needed  4. Reorient patient post seizure  5. Seizure pads on all 4 side rails  6. Instruct patient/family to notify RN of any seizure activity  7. Instruct patient/family to call for assistance with activity based on assessment  Outcome: Not Progressing  Goal: Achieves maximal functionality and self care  Description: INTERVENTIONS  1. Monitor swallowing and airway patency with patient fatigue and changes in neurological status  2. Encourage and assist patient to increase activity and self care with guidance from PT/OT  3. Encourage visually impaired, hearing impaired and aphasic patients to use assistive/communication devices  Outcome: Not Progressing     Problem: Cardiovascular - Adult  Goal: Maintains optimal cardiac output and hemodynamic stability  Description: INTERVENTIONS:  1. Monitor vital signs and rhythm  2. Monitor for hypotension and other signs of decreased cardiac output  3. Administer and titrate ordered vasoactive medications to optimize hemodynamic stability  4. Monitor for fluid overload/dehydration,  weight gain, shortness of breath and activity intolerance  5. Monitor arterial and/or venous puncture sites for bleeding and/or hematoma  6. Assess quality of pulses, capillary refill, edema, sensation, skin color and temperature  7. Assess for signs of decreased coronary artery perfusion - ex. angina  Outcome: Not Progressing  Goal: Absence of cardiac dysrhythmias or at baseline  Description: INTERVENTIONS:  1. Continuous cardiac monitoring, monitor vital signs, obtain 12 lead EKG if indicated  2. Administer antiarrhythmic and heart rate control medications as ordered  3. Initiate emergency measures for life threatening arrhythmias  4. Monitor electrolytes and administer replacement therapy as ordered  Outcome: Not Progressing     Problem: Respiratory - Adult  Goal: Achieves optimal ventilation and oxygenation  Description: INTERVENTIONS:  1. Assess for changes in respiratory status  2. Assess for changes in mentation and behavior  3. Position to facilitate oxygenation and minimize respiratory effort  4. Oxygen supplementation based on oxygen saturation or ABGs  5. Assess patient's ability to cough effectively  6. Encourage broncho-pulmonary hygiene including cough, deep breathe  7. Assess the need for suctioning   8. Assess and instruct to report SOB or any respiratory difficulty  9. Respiratory Therapy support as indicated, including medications and treatment.  Outcome: Not Progressing     Problem: Gastrointestinal - Adult  Goal: Minimal or absence of nausea and vomiting  Description: INTERVENTIONS:  1. Ensure adequate hydration  2. Monitor intake and output  3. Maintain NPO status until nausea and vomiting are resolved  4. Nasogastric tube to low intermittent suction as ordered  5. Administer ordered antiemetic medications as needed  6. Provide nonpharmacologic comfort measures as appropriate  7. Advance diet as ordered  8. Nutrition consult as indicated   Outcome: Not Progressing  Goal: Maintains or returns to  baseline digestive function  Description: INTERVENTIONS:  1. Assess bowel function  2. Ensure adequate hydration  3. Administer ordered medications as needed  4. Encourage mobilization and activity  5. Nutrition consult as indicated  6. Assess hydration and nutritional status  7. Assess characteristics and frequency of stool  8. Monitor for metabolic panel imbalances  9. Assess for treatment effectiveness  Outcome: Not Progressing  Goal: Maintains adequate nutritional intake  Description: INTERVENTIONS:  1. Monitor percentage of each meal consumed  2. Identify factors contributing to decreased intake, treat as appropriate  3. Assist with meals as needed  4. Monitor I&O, weight and lab values  5. Obtain nutritional consult as indicated  6. Administer alternative nutrition interventions as ordered  Outcome: Not Progressing  Goal: Maintains Optimal Tissue Perfusion  Description: INTERVENTIONS:  1. Monitor for increased abdominal girth, firmness or intra-abdominal pressure  2. Monitor nutritional intake, intake/output, and weight  3. Assess for signs of dehydration  4. Assess blood pressure, heart rate, and oxygen saturation  5. Assess skin color, capillary refill and edema  6. Monitor metabolic panels, blood count panels, and coagulations panels as ordered  7. Assess bowel function  8. Assess for blood in emesis and stool  Outcome: Not Progressing  Goal: Nutrient intake appropriate for improving, restoring or maintaining nutritional needs  Description: INTERVENTIONS:  1. Assess nutritional status  2. Monitor oral intake, labs, and treatment plans  3. Provide appropriate diets, oral nutritional supplements, and vitamin/mineral supplements  4. Monitor, and adjust tube feedings and TPN/PPN based on assessed needs  5. Provide specific nutrition education as appropriate  Outcome: Not Progressing     Problem: Genitourinary - Adult  Goal: Absence of urinary retention  Description: INTERVENTIONS:  1. Assess patient’s ability  to void and empty bladder.  2. Monitor intake/output and perform bladder scan if indicated.  3. Place urinary catheter per order and initiate and maintain CAUTI bundle.  4. Administer medications to alleviate retention as needed.  5. Discuss catheterization for long term situations as appropriate.    Outcome: Not Progressing  Goal: Urinary catheter remains patent  Description: INTERVENTIONS:  1. Assess patency of urinary catheter.  2. Irrigate catheter per order if indicated and notify provider if unable to irrigate.  3. Assess need for a larger catheter size or a 3-way catheter for continuous bladder irrigation.  Outcome: Not Progressing  Goal: Absence of Urinary Infection  Description: INTERVENTIONS:  1. Assess urinary status for burning, urgency, frequency, and pain  2. Assess urine for color, cloudiness, odor, and amount  3. Monitor intake/output  4. Monitor lab values  5. Obtain urinalysis as ordered  6. Assess for neurological status changes    Outcome: Not Progressing     Problem: Metabolic/Fluid and Electrolytes - Adult  Goal: Electrolytes maintained within normal limits  Description: INTERVENTIONS:  1. Monitor labs and assess patient for signs and symptoms of electrolyte imbalances  2. Administer electrolyte replacement as ordered  3. Monitor response to electrolyte replacements, including repeat lab results as appropriate  4. Fluid restriction as ordered  5. Instruct patient on fluid and nutrition restrictions as appropriate  Outcome: Not Progressing  Goal: Maintain Optimal Renal Function and Hemodynamic Stability  Description: INTERVENTIONS:  1. Monitor labs and assess for signs and symptoms of volume excess or deficit  2. Monitor intake, output and patient weight  3. Monitor urine specific gravity, serum osmolarity and serum sodium as indicated or ordered  4. Monitor response to interventions for patient's volume status, including labs, urine output, blood pressure (other measures as available)  5.  Encourage oral intake as appropriate  6. Instruct patient on fluid and nutrition restrictions as appropriate  Outcome: Not Progressing  Goal: Glucose maintained within prescribed range  Description: INTERVENTIONS:  1. Monitor blood glucose as ordered  2. Assess for signs and symptoms of hyperglycemia and hypoglycemia  3. Administer ordered medications to maintain glucose within target range  4. Assess barriers to adequate nutritional intake and initiate nutrition consult as needed  5. Assess baseline knowledge and provide education as indicated  6. Monitor exercise as may reduce the requirements for insulin  Outcome: Not Progressing     Problem: Skin/Tissue Integrity - Adult  Goal: Skin integrity remains intact  Description: INTERVENTIONS  1. Assess and document risk factors for pressure ulcer development  2. Assess and document skin integrity  3. Monitor for areas of redness and/or skin breakdown  4. Initiate pressure ulcer prevention measures as indicated  Outcome: Not Progressing  Goal: Incisions, wounds, or drain sites healing without S/S of infection  Description: INTERVENTIONS  1. Assess and document risk factors for skin breakdown  2. Assess and document skin integrity  3. Assess and document dressing/incision, wound bed, drain sites and surrounding tissue  4. Implement wound care per orders  Outcome: Not Progressing  Goal: Oral mucous membranes remain intact  Description: INTERVENTIONS  1. Assess oral mucosa and hygiene practices  2. Implement preventative oral hygiene regimen  3. Implement oral medicated treatments as ordered  Outcome: Not Progressing     Problem: Hematologic - Adult  Goal: Maintains hematologic stability  Description: INTERVENTIONS  1. Assess for signs and symptoms of bleeding or hemorrhage  2. Monitor labs  3. Administer supportive blood products/factors as ordered and appropriate  4. Administer medications as ordered  5. Initiate bleeding precautions as indicated  6. Educate  patient/family to report signs/symptoms of bleeding  Outcome: Not Progressing     Problem: Musculoskeletal - Adult  Goal: Return mobility to safest level of function  Description: INTERVENTIONS:  1. Assess patient stability and activity tolerance for standing, transferring and ambulating w/ or w/o assistive devices  2. Assist with transfers and ambulation using safe practices  3. Ensure adequate protection for wounds/incisions during mobilization  4. Obtain PT/OT and other consults as needed  5. Apply Continuous Passive Motion per order to increase flexion toward goal  6. Instruct patient/family in ordered activity level  Outcome: Not Progressing  Goal: Maintain proper alignment of affected body part  Description: INTERVENTIONS:  1. Support and protect limb and body alignment per provider order  2. Instruct and reinforce with patient and family use of appropriate assistive device and precautions (e.g. spinal or hip dislocation precautions)  Outcome: Not Progressing  Goal: Return ADL status to a safe level of function  Description: INTERVENTIONS:  1. Assess patient's ADL deficits and provide assistive devices as needed  2. Obtain PT/OT consults as needed  3. Assist and instruct patient to increase activity and self care  Outcome: Not Progressing     Problem: Safety Adult - Fall  Goal: Free from fall injury  Description: INTERVENTIONS:    Inpatient - Please reference Cares/Safety Flowsheet under [place] Fall Risk for interventions.  Pediatrics - Please reference Peds Daily Cares/Safety Flowsheet under [facility] Pediatric Fall Assessment Fall Bundle for interventions  LD/OB - Please reference OB Shift Screening Flowsheet under OB Fall Risk for interventions.  Outcome: Not Progressing     Problem: Balance  Goal: STG - Maintains static sitting balance without upper extremity support  Description: For 8 mins with SBA in prep for ADL's.   Outcome: Not Progressing     Problem: Transfers  Goal: STG - Patient will perform toilet  transfer  Description: With min of 2.   Outcome: Not Progressing     Problem: Mobility  Goal: LTG - Patient will ascend and descend stairs  Description: (3) with one hand rail and stand-by assistance.  Outcome: Not Progressing  Goal: LTG - Patient will ambulate household distance  Description: With the LRAD and stand-by assistance.  Outcome: Not Progressing     Problem: TRANSFERS  Goal: STG - Patient will transfer to and from sit to stand  Description: With the LRAD and minimal assistance x1.  Outcome: Not Progressing  Goal: STG - Patient will perform bed mobility  Description: With minimal assistance x1.  Outcome: Not Progressing
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
## 2020-08-14 NOTE — INTERDISCIPLINARY/THERAPY
Spiritual Care Service:     08/14/20 1540   Clinical Encounter Type   Referral By: Nurse   Spiritual Assessment   Completed by    Spiritual Interventions   Spiritual/[employer] Interventions Prayer provided   Patient sleeping when  arrived.  Reported to nurse Melo unable to visit at present and will attempt visit tomorrow.....Service following.
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
## 2020-08-14 NOTE — NURSING END OF SHIFT
Nursing End of Shift Summary:    Patient: Nabila Booth  MRN: [id number]  : 1985, Age: 34 y.o.    Location: [address] [place] [address]    Nursing Goals  Clinical Goals for the Shift: Maintain safety, provide for comfort.     Narrative Summary of Progress Toward Clinical Goals:  Pt maintains safety, dangles EOB with therapy. PCA started for comfort, PCA button explained to pt repeatedly. Pt continues to rate her pain 7/10 but sleeps for a couple hours in the afternoon.     Barriers to Goals/Nursing Concerns:  Yes - this RN is concerned about pt coping mechanisms with pt's  being extremely sick and potentially needing a higher level of care.  consult? Support person? Meds -- antidepressants?    New Patient or Family Concerns/Issues:  No    Shift Summary:      Significant Events & Communications to Providers (last 12 hours)      Last 5 Values    No documentation.              Oxygen Usage (last 12 hours)      Last 5 Values    No documentation.              Mobility (last 12 hours)      Last 5 Values     Row Name 20 0659 20 1626                Mobility    Activity  Bedrest  Bedrest;Turn;Sleeping       Level of Assistance  Dependent, patient does less than 25%  Maximum assist, patient does 25-49%       Anti-Embolism Devices  Bilateral;AE calf pump  --       Anti-Embolism Intervention  On  --           Urethral Catheter    Active Urethral Catheter     None            Active Lines    Active Central venous catheter / Peripherally inserted central catheter / Implantable Port / Hemodialysis catheter / Midline Catheter     None              Infusing Medications   Medication Dose Last Rate   • morphine       • LR  15 mL/hr 15 mL/hr (20 1438)     PRN Medications   Medication Dose Last Dose   • cyclobenzaprine  10 mg 10 mg at 20 0837   • naloxone  0.2 mg     • magnesium hydroxide  30 mL     • oxyCODONE  5-10 mg 5 mg at 20 1319   • sodium chloride  3 mL     • ondansetron  4 mg 4 mg at 20  1956    Or   • ondansetron  4 mg 4 mg at 08/10/20 2223   • sodium chloride 0.9% (NS)  25-50 mL 25 mL at 08/14/20 0945     _________________________  M'Valentine Jimenez RN  08/14/20 5:20 PM

## 2020-08-14 NOTE — PLAN OF CARE
Problem: Knowledge Deficit  Goal: Patient/family/caregiver demonstrates understanding of disease process, treatment plan, medications, and discharge instructions  Description: INTERVENTIONS:   1. Complete learning assessment and assess knowledge base  2. Provide teaching at level of understanding   3. Provide teaching via preferred learning methods  Outcome: Progressing     Problem: Potential for Compromised Skin Integrity  Goal: Skin Integrity is Maintained or Improved  Description: INTERVENTIONS:  1. Assess and monitor skin integrity  2. Collaborate with interdisciplinary team and initiate plans and interventions as needed  3. Alternate a full bath with partial baths for elderly   4. Monitor patient's hygiene practices   5. Collaborate with wound, ostomy, and continence nurse  Outcome: Progressing  Goal: Nutritional status is improving  Description: INTERVENTIONS:  1. Monitor and assess patient for malnutrition (ex- brittle hair, bruises, dry skin, pale skin and conjunctiva, muscle wasting, smooth red tongue, and disorientation)  2. Monitor patient's weight and dietary intake as ordered or per policy  3. Determine patient's food preferences and provide high-protein, high-caloric foods as appropriate  4. Assist patient with eating   5. Allow adequate time for meals   6. Encourage patient to take dietary supplement as ordered   7. Collaborate with dietitian  8. Include patient/family/caregiver in decisions related to nutrition  Outcome: Progressing  Goal: MOBILITY IS MAINTAINED OR IMPROVED  Description: INTERVENTIONS  1. Collaborate with interdisciplinary team and initiate plan and interventions as ordered (PT/OT)  2. Encourage ambulation  3. Up to chair for meals  4. Monitor for signs of deconditioning  Outcome: Progressing     Problem: Neurosensory - Adult  Goal: Achieves stable or improved neurological status  Description: INTERVENTIONS  1. Assess for and report changes in neurological status  2. Initiate  measures to prevent increased intracranial pressure  3. Maintain blood pressure and fluid volume within ordered parameters to optimize cerebral perfusion and minimize risk of hemorrhage  4. Monitor temperature, glucose, and sodium. Initiate appropriate interventions as ordered  Outcome: Progressing  Goal: Absence of seizures  Description: INTERVENTIONS  1. Monitor for seizure activity  2. Administer anti-seizure medications as ordered  3. Monitor neurological status  4. Assist patient in avoiding triggers, if identified  Outcome: Progressing  Goal: Remains free of injury related to seizures activity  Description: INTERVENTIONS  1. Maintain airway, patient safety  and administer oxygen as ordered  2. Monitor patient for seizure activity, document and report duration and description of seizure to provider  3. If seizure occurs, turn patient to side and suction secretions as needed  4. Reorient patient post seizure  5. Seizure pads on all 4 side rails  6. Instruct patient/family to notify RN of any seizure activity  7. Instruct patient/family to call for assistance with activity based on assessment  Outcome: Progressing  Goal: Achieves maximal functionality and self care  Description: INTERVENTIONS  1. Monitor swallowing and airway patency with patient fatigue and changes in neurological status  2. Encourage and assist patient to increase activity and self care with guidance from PT/OT  3. Encourage visually impaired, hearing impaired and aphasic patients to use assistive/communication devices  Outcome: Progressing     Problem: Cardiovascular - Adult  Goal: Maintains optimal cardiac output and hemodynamic stability  Description: INTERVENTIONS:  1. Monitor vital signs and rhythm  2. Monitor for hypotension and other signs of decreased cardiac output  3. Administer and titrate ordered vasoactive medications to optimize hemodynamic stability  4. Monitor for fluid overload/dehydration, weight gain, shortness of breath and  activity intolerance  5. Monitor arterial and/or venous puncture sites for bleeding and/or hematoma  6. Assess quality of pulses, capillary refill, edema, sensation, skin color and temperature  7. Assess for signs of decreased coronary artery perfusion - ex. angina  Outcome: Progressing  Goal: Absence of cardiac dysrhythmias or at baseline  Description: INTERVENTIONS:  1. Continuous cardiac monitoring, monitor vital signs, obtain 12 lead EKG if indicated  2. Administer antiarrhythmic and heart rate control medications as ordered  3. Initiate emergency measures for life threatening arrhythmias  4. Monitor electrolytes and administer replacement therapy as ordered  Outcome: Progressing     Problem: Respiratory - Adult  Goal: Achieves optimal ventilation and oxygenation  Description: INTERVENTIONS:  1. Assess for changes in respiratory status  2. Assess for changes in mentation and behavior  3. Position to facilitate oxygenation and minimize respiratory effort  4. Oxygen supplementation based on oxygen saturation or ABGs  5. Assess patient's ability to cough effectively  6. Encourage broncho-pulmonary hygiene including cough, deep breathe  7. Assess the need for suctioning   8. Assess and instruct to report SOB or any respiratory difficulty  9. Respiratory Therapy support as indicated, including medications and treatment.  Outcome: Progressing     Problem: Gastrointestinal - Adult  Goal: Minimal or absence of nausea and vomiting  Description: INTERVENTIONS:  1. Ensure adequate hydration  2. Monitor intake and output  3. Maintain NPO status until nausea and vomiting are resolved  4. Nasogastric tube to low intermittent suction as ordered  5. Administer ordered antiemetic medications as needed  6. Provide nonpharmacologic comfort measures as appropriate  7. Advance diet as ordered  8. Nutrition consult as indicated   Outcome: Progressing  Goal: Maintains or returns to baseline digestive function  Description:  INTERVENTIONS:  1. Assess bowel function  2. Ensure adequate hydration  3. Administer ordered medications as needed  4. Encourage mobilization and activity  5. Nutrition consult as indicated  6. Assess hydration and nutritional status  7. Assess characteristics and frequency of stool  8. Monitor for metabolic panel imbalances  9. Assess for treatment effectiveness  Outcome: Progressing  Goal: Maintains adequate nutritional intake  Description: INTERVENTIONS:  1. Monitor percentage of each meal consumed  2. Identify factors contributing to decreased intake, treat as appropriate  3. Assist with meals as needed  4. Monitor I&O, weight and lab values  5. Obtain nutritional consult as indicated  6. Administer alternative nutrition interventions as ordered  Outcome: Progressing  Goal: Maintains Optimal Tissue Perfusion  Description: INTERVENTIONS:  1. Monitor for increased abdominal girth, firmness or intra-abdominal pressure  2. Monitor nutritional intake, intake/output, and weight  3. Assess for signs of dehydration  4. Assess blood pressure, heart rate, and oxygen saturation  5. Assess skin color, capillary refill and edema  6. Monitor metabolic panels, blood count panels, and coagulations panels as ordered  7. Assess bowel function  8. Assess for blood in emesis and stool  Outcome: Progressing  Goal: Nutrient intake appropriate for improving, restoring or maintaining nutritional needs  Description: INTERVENTIONS:  1. Assess nutritional status  2. Monitor oral intake, labs, and treatment plans  3. Provide appropriate diets, oral nutritional supplements, and vitamin/mineral supplements  4. Monitor, and adjust tube feedings and TPN/PPN based on assessed needs  5. Provide specific nutrition education as appropriate  Outcome: Progressing     Problem: Metabolic/Fluid and Electrolytes - Adult  Goal: Electrolytes maintained within normal limits  Description: INTERVENTIONS:  1. Monitor labs and assess patient for signs and  symptoms of electrolyte imbalances  2. Administer electrolyte replacement as ordered  3. Monitor response to electrolyte replacements, including repeat lab results as appropriate  4. Fluid restriction as ordered  5. Instruct patient on fluid and nutrition restrictions as appropriate  Outcome: Progressing  Goal: Maintain Optimal Renal Function and Hemodynamic Stability  Description: INTERVENTIONS:  1. Monitor labs and assess for signs and symptoms of volume excess or deficit  2. Monitor intake, output and patient weight  3. Monitor urine specific gravity, serum osmolarity and serum sodium as indicated or ordered  4. Monitor response to interventions for patient's volume status, including labs, urine output, blood pressure (other measures as available)  5. Encourage oral intake as appropriate  6. Instruct patient on fluid and nutrition restrictions as appropriate  Outcome: Progressing  Goal: Glucose maintained within prescribed range  Description: INTERVENTIONS:  1. Monitor blood glucose as ordered  2. Assess for signs and symptoms of hyperglycemia and hypoglycemia  3. Administer ordered medications to maintain glucose within target range  4. Assess barriers to adequate nutritional intake and initiate nutrition consult as needed  5. Assess baseline knowledge and provide education as indicated  6. Monitor exercise as may reduce the requirements for insulin  Outcome: Progressing     Problem: Skin/Tissue Integrity - Adult  Goal: Skin integrity remains intact  Description: INTERVENTIONS  1. Assess and document risk factors for pressure ulcer development  2. Assess and document skin integrity  3. Monitor for areas of redness and/or skin breakdown  4. Initiate pressure ulcer prevention measures as indicated  Outcome: Progressing  Goal: Incisions, wounds, or drain sites healing without S/S of infection  Description: INTERVENTIONS  1. Assess and document risk factors for skin breakdown  2. Assess and document skin  integrity  3. Assess and document dressing/incision, wound bed, drain sites and surrounding tissue  4. Implement wound care per orders  Outcome: Progressing  Goal: Oral mucous membranes remain intact  Description: INTERVENTIONS  1. Assess oral mucosa and hygiene practices  2. Implement preventative oral hygiene regimen  3. Implement oral medicated treatments as ordered  Outcome: Progressing     Problem: Hematologic - Adult  Goal: Maintains hematologic stability  Description: INTERVENTIONS  1. Assess for signs and symptoms of bleeding or hemorrhage  2. Monitor labs  3. Administer supportive blood products/factors as ordered and appropriate  4. Administer medications as ordered  5. Initiate bleeding precautions as indicated  6. Educate patient/family to report signs/symptoms of bleeding  Outcome: Progressing     Problem: Musculoskeletal - Adult  Goal: Return mobility to safest level of function  Description: INTERVENTIONS:  1. Assess patient stability and activity tolerance for standing, transferring and ambulating w/ or w/o assistive devices  2. Assist with transfers and ambulation using safe practices  3. Ensure adequate protection for wounds/incisions during mobilization  4. Obtain PT/OT and other consults as needed  5. Apply Continuous Passive Motion per order to increase flexion toward goal  6. Instruct patient/family in ordered activity level  Outcome: Progressing  Goal: Maintain proper alignment of affected body part  Description: INTERVENTIONS:  1. Support and protect limb and body alignment per provider order  2. Instruct and reinforce with patient and family use of appropriate assistive device and precautions (e.g. spinal or hip dislocation precautions)  Outcome: Progressing  Goal: Return ADL status to a safe level of function  Description: INTERVENTIONS:  1. Assess patient's ADL deficits and provide assistive devices as needed  2. Obtain PT/OT consults as needed  3. Assist and instruct patient to increase  activity and self care  Outcome: Progressing     Problem: Safety Adult - Fall  Goal: Free from fall injury  Description: INTERVENTIONS:    Inpatient - Please reference Cares/Safety Flowsheet under [place] Fall Risk for interventions.  Pediatrics - Please reference Peds Daily Cares/Safety Flowsheet under [facility] Pediatric Fall Assessment Fall Bundle for interventions  LD/OB - Please reference OB Shift Screening Flowsheet under OB Fall Risk for interventions.  Outcome: Progressing
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
## 2020-08-15 LAB
ANION GAP SERPL CALC-SCNC: 8 MMOL/L (ref 3–11)
BUN SERPL-MCNC: 9 MG/DL (ref 7–25)
CALCIUM SERPL-MCNC: 8.8 MG/DL (ref 8.6–10.3)
CHLORIDE SERPL-SCNC: 102 MMOL/L (ref 98–107)
CO2 SERPL-SCNC: 28 MMOL/L (ref 21–32)
CREAT SERPL-MCNC: 0.52 MG/DL (ref 0.6–1.1)
ERYTHROCYTE [DISTWIDTH] IN BLOOD BY AUTOMATED COUNT: 13.7 % (ref 11.5–14)
GFR SERPL CREATININE-BSD FRML MDRD: 126 ML/MIN/1.73M*2
GLUCOSE SERPL-MCNC: 116 MG/DL (ref 70–105)
HCT VFR BLD AUTO: 29.2 % (ref 34–45)
HGB BLD-MCNC: 9.9 G/DL (ref 11.5–15.5)
MAGNESIUM SERPL-MCNC: 2 MG/DL (ref 1.8–2.4)
MCH RBC QN AUTO: 31.6 PG (ref 28–33)
MCHC RBC AUTO-ENTMCNC: 33.9 G/DL (ref 32–36)
MCV RBC AUTO: 93.2 FL (ref 81–97)
PLATELET # BLD AUTO: 255 10*3/UL (ref 140–350)
PMV BLD AUTO: 7.1 FL (ref 6.9–10.8)
POTASSIUM SERPL-SCNC: 3.8 MMOL/L (ref 3.5–5.1)
RBC # BLD AUTO: 3.14 10*6/ΜL (ref 3.7–5.3)
SODIUM SERPL-SCNC: 138 MMOL/L (ref 135–145)
WBC # BLD AUTO: 10.7 10*3/UL (ref 4.5–10.5)

## 2020-08-15 PROCEDURE — 6370000100 HC RX 637 (ALT 250 FOR IP): Performed by: NURSE PRACTITIONER

## 2020-08-15 PROCEDURE — 1110000100 HC ROOM PRIVATE W TELE

## 2020-08-15 PROCEDURE — 99231 SBSQ HOSP IP/OBS SF/LOW 25: CPT | Performed by: PHYSICIAN ASSISTANT

## 2020-08-15 PROCEDURE — 6360000200 HC RX 636 W HCPCS (ALT 250 FOR IP): Performed by: PHYSICIAN ASSISTANT

## 2020-08-15 PROCEDURE — 2590000100 HC RX 259: Performed by: ORTHOPAEDIC SURGERY

## 2020-08-15 PROCEDURE — 85027 COMPLETE CBC AUTOMATED: CPT | Performed by: NURSE PRACTITIONER

## 2020-08-15 PROCEDURE — 6360000200 HC RX 636 W HCPCS (ALT 250 FOR IP): Performed by: NURSE PRACTITIONER

## 2020-08-15 PROCEDURE — 80048 BASIC METABOLIC PNL TOTAL CA: CPT | Performed by: NURSE PRACTITIONER

## 2020-08-15 PROCEDURE — 2590000100 HC RX 259: Performed by: PHYSICIAN ASSISTANT

## 2020-08-15 PROCEDURE — (BLANK) HC ROOM PRIVATE

## 2020-08-15 PROCEDURE — 2580000300 HC RX 258: Performed by: PHYSICIAN ASSISTANT

## 2020-08-15 PROCEDURE — 2590000100 HC RX 259: Performed by: INTERNAL MEDICINE

## 2020-08-15 PROCEDURE — 97530 THERAPEUTIC ACTIVITIES: CPT | Mod: GP | Performed by: PHYSICAL THERAPIST

## 2020-08-15 PROCEDURE — 6370000100 HC RX 637 (ALT 250 FOR IP): Performed by: INTERNAL MEDICINE

## 2020-08-15 PROCEDURE — 36415 COLL VENOUS BLD VENIPUNCTURE: CPT | Performed by: NURSE PRACTITIONER

## 2020-08-15 PROCEDURE — 83735 ASSAY OF MAGNESIUM: CPT | Performed by: NURSE PRACTITIONER

## 2020-08-15 RX ORDER — BISACODYL 10 MG/1
10 SUPPOSITORY RECTAL DAILY PRN
Status: DISCONTINUED | OUTPATIENT
Start: 2020-08-15 | End: 2020-08-18 | Stop reason: HOSPADM

## 2020-08-15 RX ADMIN — KETOROLAC TROMETHAMINE 15 MG: 15 INJECTION, SOLUTION INTRAMUSCULAR; INTRAVENOUS at 23:40

## 2020-08-15 RX ADMIN — SODIUM CHLORIDE 25 ML: 9 INJECTION, SOLUTION INTRAVENOUS at 23:07

## 2020-08-15 RX ADMIN — ACETAMINOPHEN 650 MG: 325 TABLET ORAL at 23:40

## 2020-08-15 RX ADMIN — ACETAMINOPHEN 650 MG: 325 TABLET ORAL at 11:27

## 2020-08-15 RX ADMIN — ENOXAPARIN SODIUM 40 MG: 40 INJECTION SUBCUTANEOUS at 15:27

## 2020-08-15 RX ADMIN — GABAPENTIN 300 MG: 300 CAPSULE ORAL at 09:06

## 2020-08-15 RX ADMIN — Medication 8.6 MG: at 20:45

## 2020-08-15 RX ADMIN — KETOROLAC TROMETHAMINE 15 MG: 15 INJECTION, SOLUTION INTRAMUSCULAR; INTRAVENOUS at 11:27

## 2020-08-15 RX ADMIN — POLYETHYLENE GLYCOL 3350 17 G: 17 POWDER, FOR SOLUTION ORAL at 09:06

## 2020-08-15 RX ADMIN — GABAPENTIN 300 MG: 300 CAPSULE ORAL at 15:27

## 2020-08-15 RX ADMIN — KETOROLAC TROMETHAMINE 15 MG: 15 INJECTION, SOLUTION INTRAMUSCULAR; INTRAVENOUS at 00:17

## 2020-08-15 RX ADMIN — ACETAMINOPHEN 650 MG: 325 TABLET ORAL at 00:17

## 2020-08-15 RX ADMIN — KETOROLAC TROMETHAMINE 15 MG: 15 INJECTION, SOLUTION INTRAMUSCULAR; INTRAVENOUS at 05:20

## 2020-08-15 RX ADMIN — Medication 8.6 MG: at 09:06

## 2020-08-15 RX ADMIN — SODIUM CHLORIDE 25 ML: 9 INJECTION, SOLUTION INTRAVENOUS at 03:41

## 2020-08-15 RX ADMIN — ACETAMINOPHEN 650 MG: 325 TABLET ORAL at 17:04

## 2020-08-15 RX ADMIN — DOCUSATE SODIUM 200 MG: 100 CAPSULE, LIQUID FILLED ORAL at 20:45

## 2020-08-15 RX ADMIN — TAMSULOSIN HYDROCHLORIDE 0.4 MG: 0.4 CAPSULE ORAL at 17:04

## 2020-08-15 RX ADMIN — GABAPENTIN 300 MG: 300 CAPSULE ORAL at 20:45

## 2020-08-15 RX ADMIN — LIDOCAINE 1 PATCH: 560 PATCH PERCUTANEOUS; TOPICAL; TRANSDERMAL at 09:06

## 2020-08-15 RX ADMIN — DOCUSATE SODIUM 200 MG: 100 CAPSULE, LIQUID FILLED ORAL at 09:06

## 2020-08-15 RX ADMIN — SODIUM CHLORIDE, POTASSIUM CHLORIDE, SODIUM LACTATE AND CALCIUM CHLORIDE 15 ML/HR: 600; 310; 30; 20 INJECTION, SOLUTION INTRAVENOUS at 06:05

## 2020-08-15 RX ADMIN — DOCUSATE SODIUM 50MG AND SENNOSIDES 8.6MG 1 TABLET: 8.6; 5 TABLET, FILM COATED ORAL at 20:45

## 2020-08-15 RX ADMIN — DOCUSATE SODIUM 50MG AND SENNOSIDES 8.6MG 1 TABLET: 8.6; 5 TABLET, FILM COATED ORAL at 09:06

## 2020-08-15 RX ADMIN — ACETAMINOPHEN 650 MG: 325 TABLET ORAL at 05:20

## 2020-08-15 RX ADMIN — KETOROLAC TROMETHAMINE 15 MG: 15 INJECTION, SOLUTION INTRAMUSCULAR; INTRAVENOUS at 17:04
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
## 2020-08-15 NOTE — PLAN OF CARE
Problem: Knowledge Deficit  Goal: Patient/family/caregiver demonstrates understanding of disease process, treatment plan, medications, and discharge instructions  Description: INTERVENTIONS:   1. Complete learning assessment and assess knowledge base  2. Provide teaching at level of understanding   3. Provide teaching via preferred learning methods  Outcome: Progressing     Problem: Potential for Compromised Skin Integrity  Goal: Skin Integrity is Maintained or Improved  Description: INTERVENTIONS:  1. Assess and monitor skin integrity  2. Collaborate with interdisciplinary team and initiate plans and interventions as needed  3. Alternate a full bath with partial baths for elderly   4. Monitor patient's hygiene practices   5. Collaborate with wound, ostomy, and continence nurse  Outcome: Progressing  Goal: Nutritional status is improving  Description: INTERVENTIONS:  1. Monitor and assess patient for malnutrition (ex- brittle hair, bruises, dry skin, pale skin and conjunctiva, muscle wasting, smooth red tongue, and disorientation)  2. Monitor patient's weight and dietary intake as ordered or per policy  3. Determine patient's food preferences and provide high-protein, high-caloric foods as appropriate  4. Assist patient with eating   5. Allow adequate time for meals   6. Encourage patient to take dietary supplement as ordered   7. Collaborate with dietitian  8. Include patient/family/caregiver in decisions related to nutrition  Outcome: Progressing  Goal: MOBILITY IS MAINTAINED OR IMPROVED  Description: INTERVENTIONS  1. Collaborate with interdisciplinary team and initiate plan and interventions as ordered (PT/OT)  2. Encourage ambulation  3. Up to chair for meals  4. Monitor for signs of deconditioning  Outcome: Progressing     Problem: Neurosensory - Adult  Goal: Achieves stable or improved neurological status  Description: INTERVENTIONS  1. Assess for and report changes in neurological status  2. Initiate  measures to prevent increased intracranial pressure  3. Maintain blood pressure and fluid volume within ordered parameters to optimize cerebral perfusion and minimize risk of hemorrhage  4. Monitor temperature, glucose, and sodium. Initiate appropriate interventions as ordered  Outcome: Progressing  Goal: Absence of seizures  Description: INTERVENTIONS  1. Monitor for seizure activity  2. Administer anti-seizure medications as ordered  3. Monitor neurological status  4. Assist patient in avoiding triggers, if identified  Outcome: Progressing  Goal: Remains free of injury related to seizures activity  Description: INTERVENTIONS  1. Maintain airway, patient safety  and administer oxygen as ordered  2. Monitor patient for seizure activity, document and report duration and description of seizure to provider  3. If seizure occurs, turn patient to side and suction secretions as needed  4. Reorient patient post seizure  5. Seizure pads on all 4 side rails  6. Instruct patient/family to notify RN of any seizure activity  7. Instruct patient/family to call for assistance with activity based on assessment  Outcome: Progressing  Goal: Achieves maximal functionality and self care  Description: INTERVENTIONS  1. Monitor swallowing and airway patency with patient fatigue and changes in neurological status  2. Encourage and assist patient to increase activity and self care with guidance from PT/OT  3. Encourage visually impaired, hearing impaired and aphasic patients to use assistive/communication devices  Outcome: Progressing     Problem: Cardiovascular - Adult  Goal: Maintains optimal cardiac output and hemodynamic stability  Description: INTERVENTIONS:  1. Monitor vital signs and rhythm  2. Monitor for hypotension and other signs of decreased cardiac output  3. Administer and titrate ordered vasoactive medications to optimize hemodynamic stability  4. Monitor for fluid overload/dehydration, weight gain, shortness of breath and  activity intolerance  5. Monitor arterial and/or venous puncture sites for bleeding and/or hematoma  6. Assess quality of pulses, capillary refill, edema, sensation, skin color and temperature  7. Assess for signs of decreased coronary artery perfusion - ex. angina  Outcome: Progressing  Goal: Absence of cardiac dysrhythmias or at baseline  Description: INTERVENTIONS:  1. Continuous cardiac monitoring, monitor vital signs, obtain 12 lead EKG if indicated  2. Administer antiarrhythmic and heart rate control medications as ordered  3. Initiate emergency measures for life threatening arrhythmias  4. Monitor electrolytes and administer replacement therapy as ordered  Outcome: Progressing     Problem: Respiratory - Adult  Goal: Achieves optimal ventilation and oxygenation  Description: INTERVENTIONS:  1. Assess for changes in respiratory status  2. Assess for changes in mentation and behavior  3. Position to facilitate oxygenation and minimize respiratory effort  4. Oxygen supplementation based on oxygen saturation or ABGs  5. Assess patient's ability to cough effectively  6. Encourage broncho-pulmonary hygiene including cough, deep breathe  7. Assess the need for suctioning   8. Assess and instruct to report SOB or any respiratory difficulty  9. Respiratory Therapy support as indicated, including medications and treatment.  Outcome: Progressing     Problem: Gastrointestinal - Adult  Goal: Minimal or absence of nausea and vomiting  Description: INTERVENTIONS:  1. Ensure adequate hydration  2. Monitor intake and output  3. Maintain NPO status until nausea and vomiting are resolved  4. Nasogastric tube to low intermittent suction as ordered  5. Administer ordered antiemetic medications as needed  6. Provide nonpharmacologic comfort measures as appropriate  7. Advance diet as ordered  8. Nutrition consult as indicated   Outcome: Progressing  Goal: Maintains or returns to baseline digestive function  Description:  INTERVENTIONS:  1. Assess bowel function  2. Ensure adequate hydration  3. Administer ordered medications as needed  4. Encourage mobilization and activity  5. Nutrition consult as indicated  6. Assess hydration and nutritional status  7. Assess characteristics and frequency of stool  8. Monitor for metabolic panel imbalances  9. Assess for treatment effectiveness  Outcome: Progressing  Goal: Maintains adequate nutritional intake  Description: INTERVENTIONS:  1. Monitor percentage of each meal consumed  2. Identify factors contributing to decreased intake, treat as appropriate  3. Assist with meals as needed  4. Monitor I&O, weight and lab values  5. Obtain nutritional consult as indicated  6. Administer alternative nutrition interventions as ordered  Outcome: Progressing  Goal: Maintains Optimal Tissue Perfusion  Description: INTERVENTIONS:  1. Monitor for increased abdominal girth, firmness or intra-abdominal pressure  2. Monitor nutritional intake, intake/output, and weight  3. Assess for signs of dehydration  4. Assess blood pressure, heart rate, and oxygen saturation  5. Assess skin color, capillary refill and edema  6. Monitor metabolic panels, blood count panels, and coagulations panels as ordered  7. Assess bowel function  8. Assess for blood in emesis and stool  Outcome: Progressing  Goal: Nutrient intake appropriate for improving, restoring or maintaining nutritional needs  Description: INTERVENTIONS:  1. Assess nutritional status  2. Monitor oral intake, labs, and treatment plans  3. Provide appropriate diets, oral nutritional supplements, and vitamin/mineral supplements  4. Monitor, and adjust tube feedings and TPN/PPN based on assessed needs  5. Provide specific nutrition education as appropriate  Outcome: Progressing     Problem: Genitourinary - Adult  Goal: Absence of urinary retention  Description: INTERVENTIONS:  1. Assess patient’s ability to void and empty bladder.  2. Monitor intake/output and  perform bladder scan if indicated.  3. Place urinary catheter per order and initiate and maintain CAUTI bundle.  4. Administer medications to alleviate retention as needed.  5. Discuss catheterization for long term situations as appropriate.    Outcome: Progressing  Goal: Urinary catheter remains patent  Description: INTERVENTIONS:  1. Assess patency of urinary catheter.  2. Irrigate catheter per order if indicated and notify provider if unable to irrigate.  3. Assess need for a larger catheter size or a 3-way catheter for continuous bladder irrigation.  Outcome: Progressing  Goal: Absence of Urinary Infection  Description: INTERVENTIONS:  1. Assess urinary status for burning, urgency, frequency, and pain  2. Assess urine for color, cloudiness, odor, and amount  3. Monitor intake/output  4. Monitor lab values  5. Obtain urinalysis as ordered  6. Assess for neurological status changes    Outcome: Progressing     Problem: Metabolic/Fluid and Electrolytes - Adult  Goal: Electrolytes maintained within normal limits  Description: INTERVENTIONS:  1. Monitor labs and assess patient for signs and symptoms of electrolyte imbalances  2. Administer electrolyte replacement as ordered  3. Monitor response to electrolyte replacements, including repeat lab results as appropriate  4. Fluid restriction as ordered  5. Instruct patient on fluid and nutrition restrictions as appropriate  Outcome: Progressing  Goal: Maintain Optimal Renal Function and Hemodynamic Stability  Description: INTERVENTIONS:  1. Monitor labs and assess for signs and symptoms of volume excess or deficit  2. Monitor intake, output and patient weight  3. Monitor urine specific gravity, serum osmolarity and serum sodium as indicated or ordered  4. Monitor response to interventions for patient's volume status, including labs, urine output, blood pressure (other measures as available)  5. Encourage oral intake as appropriate  6. Instruct patient on fluid and  nutrition restrictions as appropriate  Outcome: Progressing  Goal: Glucose maintained within prescribed range  Description: INTERVENTIONS:  1. Monitor blood glucose as ordered  2. Assess for signs and symptoms of hyperglycemia and hypoglycemia  3. Administer ordered medications to maintain glucose within target range  4. Assess barriers to adequate nutritional intake and initiate nutrition consult as needed  5. Assess baseline knowledge and provide education as indicated  6. Monitor exercise as may reduce the requirements for insulin  Outcome: Progressing     Problem: Skin/Tissue Integrity - Adult  Goal: Skin integrity remains intact  Description: INTERVENTIONS  1. Assess and document risk factors for pressure ulcer development  2. Assess and document skin integrity  3. Monitor for areas of redness and/or skin breakdown  4. Initiate pressure ulcer prevention measures as indicated  Outcome: Progressing  Goal: Incisions, wounds, or drain sites healing without S/S of infection  Description: INTERVENTIONS  1. Assess and document risk factors for skin breakdown  2. Assess and document skin integrity  3. Assess and document dressing/incision, wound bed, drain sites and surrounding tissue  4. Implement wound care per orders  Outcome: Progressing  Goal: Oral mucous membranes remain intact  Description: INTERVENTIONS  1. Assess oral mucosa and hygiene practices  2. Implement preventative oral hygiene regimen  3. Implement oral medicated treatments as ordered  Outcome: Progressing     Problem: Hematologic - Adult  Goal: Maintains hematologic stability  Description: INTERVENTIONS  1. Assess for signs and symptoms of bleeding or hemorrhage  2. Monitor labs  3. Administer supportive blood products/factors as ordered and appropriate  4. Administer medications as ordered  5. Initiate bleeding precautions as indicated  6. Educate patient/family to report signs/symptoms of bleeding  Outcome: Progressing     Problem: Musculoskeletal  - Adult  Goal: Return mobility to safest level of function  Description: INTERVENTIONS:  1. Assess patient stability and activity tolerance for standing, transferring and ambulating w/ or w/o assistive devices  2. Assist with transfers and ambulation using safe practices  3. Ensure adequate protection for wounds/incisions during mobilization  4. Obtain PT/OT and other consults as needed  5. Apply Continuous Passive Motion per order to increase flexion toward goal  6. Instruct patient/family in ordered activity level  Outcome: Progressing  Goal: Maintain proper alignment of affected body part  Description: INTERVENTIONS:  1. Support and protect limb and body alignment per provider order  2. Instruct and reinforce with patient and family use of appropriate assistive device and precautions (e.g. spinal or hip dislocation precautions)  Outcome: Progressing  Goal: Return ADL status to a safe level of function  Description: INTERVENTIONS:  1. Assess patient's ADL deficits and provide assistive devices as needed  2. Obtain PT/OT consults as needed  3. Assist and instruct patient to increase activity and self care  Outcome: Progressing     Problem: Safety Adult - Fall  Goal: Free from fall injury  Description: INTERVENTIONS:    Inpatient - Please reference Cares/Safety Flowsheet under [place] Fall Risk for interventions.  Pediatrics - Please reference Peds Daily Cares/Safety Flowsheet under [facility] Pediatric Fall Assessment Fall Bundle for interventions  LD/OB - Please reference OB Shift Screening Flowsheet under OB Fall Risk for interventions.  Outcome: Progressing
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
## 2020-08-15 NOTE — PLAN OF CARE
Problem: Knowledge Deficit  Goal: Patient/family/caregiver demonstrates understanding of disease process, treatment plan, medications, and discharge instructions  Description: INTERVENTIONS:   1. Complete learning assessment and assess knowledge base  2. Provide teaching at level of understanding   3. Provide teaching via preferred learning methods  Outcome: Progressing  Flowsheets (Taken 8/15/2020 0828)  Patient/family/caregiver demonstrates understanding of disease process, treatment plan, medications, and discharge instructions:   Complete learning assessment and assess knowledge base   Provide teaching via preferred learning methods   Provide teaching at level of understanding     Problem: Potential for Compromised Skin Integrity  Goal: Skin Integrity is Maintained or Improved  Description: INTERVENTIONS:  1. Assess and monitor skin integrity  2. Collaborate with interdisciplinary team and initiate plans and interventions as needed  3. Alternate a full bath with partial baths for elderly   4. Monitor patient's hygiene practices   5. Collaborate with wound, ostomy, and continence nurse  Outcome: Progressing  Flowsheets (Taken 8/15/2020 0828)  Skin integrity is maintained or improved:   Assess and monitor skin integrity   Alternate a full bath with partial baths for elderly   Collaborate with interdisciplinary team and initiate plans and interventions as needed   Monitor patient's hygiene practices  Goal: Nutritional status is improving  Description: INTERVENTIONS:  1. Monitor and assess patient for malnutrition (ex- brittle hair, bruises, dry skin, pale skin and conjunctiva, muscle wasting, smooth red tongue, and disorientation)  2. Monitor patient's weight and dietary intake as ordered or per policy  3. Determine patient's food preferences and provide high-protein, high-caloric foods as appropriate  4. Assist patient with eating   5. Allow adequate time for meals   6. Encourage patient to take dietary  supplement as ordered   7. Collaborate with dietitian  8. Include patient/family/caregiver in decisions related to nutrition  Outcome: Progressing  Flowsheets (Taken 8/15/2020 0828)  Nutritional status is improving:   Monitor and assess patient for malnutrition (ex- brittle hair, bruises, dry skin, pale skin and conjunctiva, muscle wasting, smooth red tongue, and disorientation)   Monitor patient's weight and dietary intake as ordered or per policy   Determine patient's food preferences and provide high-protein, high-caloric foods as appropriate   Assist patient with eating   Allow adequate time for meals   Encourage patient to take dietary supplement as ordered   Collaborate with dietitian   Include patient/family/caregiver in decisions related to nutrition  Goal: MOBILITY IS MAINTAINED OR IMPROVED  Description: INTERVENTIONS  1. Collaborate with interdisciplinary team and initiate plan and interventions as ordered (PT/OT)  2. Encourage ambulation  3. Up to chair for meals  4. Monitor for signs of deconditioning  Outcome: Progressing  Flowsheets (Taken 8/15/2020 0828)  Mobility is Maintained or Improved:   Collaborate with interdisciplinary team and initiate plan and interventions as ordered (PT/OT)   Encourage ambulation   Up to chair for meals   Monitor for signs of deconditioning     Problem: Neurosensory - Adult  Goal: Achieves stable or improved neurological status  Description: INTERVENTIONS  1. Assess for and report changes in neurological status  2. Initiate measures to prevent increased intracranial pressure  3. Maintain blood pressure and fluid volume within ordered parameters to optimize cerebral perfusion and minimize risk of hemorrhage  4. Monitor temperature, glucose, and sodium. Initiate appropriate interventions as ordered  Outcome: Progressing  Flowsheets (Taken 8/15/2020 0828)  Achieves stable or improved neurological status:   Assess for and report changes in neurological status   Initiate  measures to prevent increased intracranial pressure   Maintain blood pressure and fluid volume within ordered parameters to optimize cerebral perfusion and minimize risk of hemorrhage   Monitor temperature, glucose, and sodium. Initiate appropriate interventions as ordered  Goal: Absence of seizures  Description: INTERVENTIONS  1. Monitor for seizure activity  2. Administer anti-seizure medications as ordered  3. Monitor neurological status  4. Assist patient in avoiding triggers, if identified  Outcome: Progressing  Flowsheets (Taken 8/15/2020 0828)  Absence of seizures:   Monitor for seizure activity   Assist patient in avoiding triggers, if identified   Administer anti-seizure medications as ordered   Monitor neurological status  Goal: Remains free of injury related to seizures activity  Description: INTERVENTIONS  1. Maintain airway, patient safety  and administer oxygen as ordered  2. Monitor patient for seizure activity, document and report duration and description of seizure to provider  3. If seizure occurs, turn patient to side and suction secretions as needed  4. Reorient patient post seizure  5. Seizure pads on all 4 side rails  6. Instruct patient/family to notify RN of any seizure activity  7. Instruct patient/family to call for assistance with activity based on assessment  Outcome: Progressing  Flowsheets (Taken 8/15/2020 0828)  Remains free of injury related to seizure activity:   Maintain airway, patient safety  and administer oxygen as ordered   If seizure occurs, turn patient to side and suction secretions as needed   Seizure pads on all 4 side rails   Instruct patient/family to call for assistance with activity based on assessment   Monitor patient for seizure activity, document and report duration and description of seizure to provider   Reorient patient post seizure   Instruct patient/family to notify RN of any seizure activity  Goal: Achieves maximal functionality and self care  Description:  INTERVENTIONS  1. Monitor swallowing and airway patency with patient fatigue and changes in neurological status  2. Encourage and assist patient to increase activity and self care with guidance from PT/OT  3. Encourage visually impaired, hearing impaired and aphasic patients to use assistive/communication devices  Outcome: Progressing  Flowsheets (Taken 8/15/2020 0828)  Achieves maximal functionality and self care:   Monitor swallowing and airway patency with patient fatigue and changes in neurological status   Encourage and assist patient to increase activity and self care with guidance from PT/OT   Encourage visually impaired, hearing impaired and aphasic patients to use assistive/communication devices     Problem: Cardiovascular - Adult  Goal: Maintains optimal cardiac output and hemodynamic stability  Description: INTERVENTIONS:  1. Monitor vital signs and rhythm  2. Monitor for hypotension and other signs of decreased cardiac output  3. Administer and titrate ordered vasoactive medications to optimize hemodynamic stability  4. Monitor for fluid overload/dehydration, weight gain, shortness of breath and activity intolerance  5. Monitor arterial and/or venous puncture sites for bleeding and/or hematoma  6. Assess quality of pulses, capillary refill, edema, sensation, skin color and temperature  7. Assess for signs of decreased coronary artery perfusion - ex. angina  Outcome: Progressing  Flowsheets (Taken 8/15/2020 0828)  Maintain optimal cardiac output and hemodynamic stability:   Monitor vital signs and rhythm   Monitor for hypotension and other signs of decreased cardiac output   Administer and titrate ordered vasoactive medications to optimize hemodynamic stability   Monitor for fluid overload/dehydration, weight gain, shortness of breath and activity intolerance   Monitor arterial and/or venous puncture sites for bleeding and/or hematoma   Assess quality of pulses, capillary refill, edema, sensation, skin  color and temperature   Assess for signs of decreased coronary artery perfusion - ex. angina  Goal: Absence of cardiac dysrhythmias or at baseline  Description: INTERVENTIONS:  1. Continuous cardiac monitoring, monitor vital signs, obtain 12 lead EKG if indicated  2. Administer antiarrhythmic and heart rate control medications as ordered  3. Initiate emergency measures for life threatening arrhythmias  4. Monitor electrolytes and administer replacement therapy as ordered  Outcome: Progressing  Flowsheets (Taken 8/15/2020 0828)  Absence of cardiac dysrhythmias or at baseline:   Continuous cardiac monitoring, monitor vital signs, obtain 12 lead EKG if indicated   Initiate emergency measures for life threatening arrhythmias   Administer antiarrhythmic and heart rate control medications as ordered   Monitor electrolytes and administer replacement therapy as ordered     Problem: Respiratory - Adult  Goal: Achieves optimal ventilation and oxygenation  Description: INTERVENTIONS:  1. Assess for changes in respiratory status  2. Assess for changes in mentation and behavior  3. Position to facilitate oxygenation and minimize respiratory effort  4. Oxygen supplementation based on oxygen saturation or ABGs  5. Assess patient's ability to cough effectively  6. Encourage broncho-pulmonary hygiene including cough, deep breathe  7. Assess the need for suctioning   8. Assess and instruct to report SOB or any respiratory difficulty  9. Respiratory Therapy support as indicated, including medications and treatment.  Outcome: Progressing  Flowsheets (Taken 8/15/2020 0828)  Achieves optimal ventilation and oxygenation:   Assess for changes in respiratory status   Position to facilitate oxygenation and minimize respiratory effort   Assess patient's ability to cough effectively   Assess the need for suctioning   Respiratory Therapy support as indicated, including medications and treatment   Assess for changes in mentation and behavior    Oxygen supplementation based on oxygen saturation or arterial blood gases   Encourage broncho-pulmonary hygiene including cough, deep breathe   Assess and instruct to report shortness of breath or any respiratory difficulty     Problem: Gastrointestinal - Adult  Goal: Minimal or absence of nausea and vomiting  Description: INTERVENTIONS:  1. Ensure adequate hydration  2. Monitor intake and output  3. Maintain NPO status until nausea and vomiting are resolved  4. Nasogastric tube to low intermittent suction as ordered  5. Administer ordered antiemetic medications as needed  6. Provide nonpharmacologic comfort measures as appropriate  7. Advance diet as ordered  8. Nutrition consult as indicated   Outcome: Progressing  Flowsheets (Taken 8/15/2020 0828)  Minimal or absence of nausea and vomiting:   Ensure adequate hydration   Administer ordered antiemetic medications as needed   Advance diet as ordered   Monitor intake and output   Provide nonpharmacologic comfort measures as appropriate   Nutrition consult as indicated  Goal: Maintains or returns to baseline digestive function  Description: INTERVENTIONS:  1. Assess bowel function  2. Ensure adequate hydration  3. Administer ordered medications as needed  4. Encourage mobilization and activity  5. Nutrition consult as indicated  6. Assess hydration and nutritional status  7. Assess characteristics and frequency of stool  8. Monitor for metabolic panel imbalances  9. Assess for treatment effectiveness  Outcome: Progressing  Flowsheets (Taken 8/15/2020 0828)  Maintains or returns to baseline bowel function:   Assess bowel function   Encourage mobilization and activity   Assess characteristics and frequency of stool   Ensure adequate hydration   Nutrition consult as indicated   Monitor for metabolic panel imbalances   Administer ordered medications as needed   Assess hydration and nutritional status   Assess for treatment effectiveness  Goal: Maintains adequate  nutritional intake  Description: INTERVENTIONS:  1. Monitor percentage of each meal consumed  2. Identify factors contributing to decreased intake, treat as appropriate  3. Assist with meals as needed  4. Monitor I&O, weight and lab values  5. Obtain nutritional consult as indicated  6. Administer alternative nutrition interventions as ordered  Outcome: Progressing  Flowsheets (Taken 8/15/2020 0828)  Maintains adequate nutritional intake:   Monitor percentage of each meal consumed   Assist with meals as needed   Obtain nutritional consult as indicated   Identify factors contributing to decreased intake, treat as appropriate   Monitor I&O, weight and lab values   Administer alternative nutrition interventions as ordered  Goal: Maintains Optimal Tissue Perfusion  Description: INTERVENTIONS:  1. Monitor for increased abdominal girth, firmness or intra-abdominal pressure  2. Monitor nutritional intake, intake/output, and weight  3. Assess for signs of dehydration  4. Assess blood pressure, heart rate, and oxygen saturation  5. Assess skin color, capillary refill and edema  6. Monitor metabolic panels, blood count panels, and coagulations panels as ordered  7. Assess bowel function  8. Assess for blood in emesis and stool  Outcome: Progressing  Flowsheets (Taken 8/15/2020 0828)  Maintains Optimal Tissue Perfusion:   Monitor for increased abdominal girth, firmness or intra-abdominal pressure   Assess for signs of dehydration   Assess skin color, capillary refill and edema   Assess bowel function   Monitor nutritional intake, intake/output, and weight   Assess blood pressure, heart rate, and oxygen saturation   Monitor metabolic panels, blood count panels, and coagulations panels as ordered   Assess for blood in emesis and stool  Goal: Nutrient intake appropriate for improving, restoring or maintaining nutritional needs  Description: INTERVENTIONS:  1. Assess nutritional status  2. Monitor oral intake, labs, and treatment  plans  3. Provide appropriate diets, oral nutritional supplements, and vitamin/mineral supplements  4. Monitor, and adjust tube feedings and TPN/PPN based on assessed needs  5. Provide specific nutrition education as appropriate  Outcome: Progressing  Flowsheets (Taken 8/15/2020 0828)  Nutrient intake appropriate for improving, restoring or maintaining nutritional needs:   Assess nutritional status   Provide appropriate diets, oral nutritional supplements, and vitamin/mineral supplements   Provide specific nutrition education as appropriate   Monitor oral intake, labs, and treatment plans     Problem: Genitourinary - Adult  Goal: Absence of urinary retention  Description: INTERVENTIONS:  1. Assess patient’s ability to void and empty bladder.  2. Monitor intake/output and perform bladder scan if indicated.  3. Place urinary catheter per order and initiate and maintain CAUTI bundle.  4. Administer medications to alleviate retention as needed.  5. Discuss catheterization for long term situations as appropriate.    Outcome: Progressing  Flowsheets (Taken 8/15/2020 0828)  Absence of urinary retention:   Assess patient’s ability to void and empty bladder   Place urinary catheter per order and initiate and maintain CAUTI bundle.   Discuss catheterization for long term situations as appropriate   Monitor intake/output and perform bladder scan if indicated   Administer medications to alleviate retention as needed  Goal: Urinary catheter remains patent  Description: INTERVENTIONS:  1. Assess patency of urinary catheter.  2. Irrigate catheter per order if indicated and notify provider if unable to irrigate.  3. Assess need for a larger catheter size or a 3-way catheter for continuous bladder irrigation.  Outcome: Progressing  Flowsheets (Taken 8/15/2020 0828)  Urinary catheter remains patent:   Assess patency of urinary catheter   Irrigate catheter per order if indicated and notify provider if unable to irrigate   Assess need  for a larger catheter size or a 3-way catheter for continuous bladder irrigation  Goal: Absence of Urinary Infection  Description: INTERVENTIONS:  1. Assess urinary status for burning, urgency, frequency, and pain  2. Assess urine for color, cloudiness, odor, and amount  3. Monitor intake/output  4. Monitor lab values  5. Obtain urinalysis as ordered  6. Assess for neurological status changes    Outcome: Progressing  Flowsheets (Taken 8/15/2020 0828)  Absence of Urinary Infection:   Assess urinary status for burning, urgency, frequency, and pain   Monitor lab values   Obtain urinalysis as ordered   Monitor intake/output   Assess urine for color, cloudiness, odor, and amount   Assess for neurological status changes     Problem: Metabolic/Fluid and Electrolytes - Adult  Goal: Electrolytes maintained within normal limits  Description: INTERVENTIONS:  1. Monitor labs and assess patient for signs and symptoms of electrolyte imbalances  2. Administer electrolyte replacement as ordered  3. Monitor response to electrolyte replacements, including repeat lab results as appropriate  4. Fluid restriction as ordered  5. Instruct patient on fluid and nutrition restrictions as appropriate  Outcome: Progressing  Flowsheets (Taken 8/15/2020 0828)  Electrolytes maintained within normal limits:   Monitor labs and assess patient for signs and symptoms of electrolyte imbalances   Monitor response to electrolyte replacements, including repeat lab results as appropriate   Instruct patient on fluid and nutrition restrictions as appropriate   Administer electrolyte replacement as ordered   Fluid restriction as ordered  Goal: Maintain Optimal Renal Function and Hemodynamic Stability  Description: INTERVENTIONS:  1. Monitor labs and assess for signs and symptoms of volume excess or deficit  2. Monitor intake, output and patient weight  3. Monitor urine specific gravity, serum osmolarity and serum sodium as indicated or ordered  4. Monitor  response to interventions for patient's volume status, including labs, urine output, blood pressure (other measures as available)  5. Encourage oral intake as appropriate  6. Instruct patient on fluid and nutrition restrictions as appropriate  Outcome: Progressing  Flowsheets (Taken 8/15/2020 0828)  Maintain optimal renal function and [facility] stability:   Monitor labs and assess for signs and symptoms of volume excess or deficit   Monitor intake, output and patient weight   Monitor urine specific gravity, serum osmolarity and serum sodium as indicated or ordered   Monitor response to interventions for patient's volume status, including labs, urine output, blood pressure (other measures as available)   Encourage oral intake as appropriate   Instruct patient on fluid and nutrition restrictions as appropriate  Goal: Glucose maintained within prescribed range  Description: INTERVENTIONS:  1. Monitor blood glucose as ordered  2. Assess for signs and symptoms of hyperglycemia and hypoglycemia  3. Administer ordered medications to maintain glucose within target range  4. Assess barriers to adequate nutritional intake and initiate nutrition consult as needed  5. Assess baseline knowledge and provide education as indicated  6. Monitor exercise as may reduce the requirements for insulin  Outcome: Progressing  Flowsheets (Taken 8/15/2020 0828)  Glucose maintained within prescribed range:   Monitor blood glucose as ordered   Administer ordered medications to maintain glucose within target range   Assess baseline knowledge and provide education as indicated   Assess for signs and symptoms of hyperglycemia and hypoglycemia   Assess barriers to adequate nutritional intake and initiate nutrition consult as needed   Monitor exercise as may reduce the requirements for insulin     Problem: Skin/Tissue Integrity - Adult  Goal: Skin integrity remains intact  Description: INTERVENTIONS  1. Assess and document risk factors for  pressure ulcer development  2. Assess and document skin integrity  3. Monitor for areas of redness and/or skin breakdown  4. Initiate pressure ulcer prevention measures as indicated  Outcome: Progressing  Flowsheets (Taken 8/15/2020 0828)  Skin integrity remains intact:   Assess and document risk factors for pressure ulcer development   Monitor for areas of redness and/or skin breakdown   Assess and document skin integrity   Initiate pressure ulcer prevention measures as indicated  Goal: Incisions, wounds, or drain sites healing without S/S of infection  Description: INTERVENTIONS  1. Assess and document risk factors for skin breakdown  2. Assess and document skin integrity  3. Assess and document dressing/incision, wound bed, drain sites and surrounding tissue  4. Implement wound care per orders  Outcome: Progressing  Flowsheets (Taken 8/15/2020 0828)  Incision(s), wound(s) or drain site(s) healing without S/S of infection:   Assess and document risk factors for skin breakdown   Assess and document skin integrity   Assess and document dressing/incision, wound bed, drain sites and surrounding tissue   Implement wound care per orders  Goal: Oral mucous membranes remain intact  Description: INTERVENTIONS  1. Assess oral mucosa and hygiene practices  2. Implement preventative oral hygiene regimen  3. Implement oral medicated treatments as ordered  Outcome: Progressing  Flowsheets (Taken 8/15/2020 0828)  Oral mucous membranes remain intact:   Assess oral mucosa and hygiene practices   Implement preventative oral hygiene regimen   Implement oral medicated treatments as ordered     Problem: Hematologic - Adult  Goal: Maintains hematologic stability  Description: INTERVENTIONS  1. Assess for signs and symptoms of bleeding or hemorrhage  2. Monitor labs  3. Administer supportive blood products/factors as ordered and appropriate  4. Administer medications as ordered  5. Initiate bleeding precautions as indicated  6. Educate  patient/family to report signs/symptoms of bleeding  Outcome: Progressing  Flowsheets (Taken 8/15/2020 0828)  Maintains hematologic stability:   Assess for signs and symptoms of bleeding or hemorrhage   Administer supportive blood products/factors as ordered and appropriate   Initiate bleeding precautions as indicated   Monitor labs   [place] medications as ordered   Educate patient/family to report signs/symptoms of bleeding     Problem: Musculoskeletal - Adult  Goal: Return mobility to safest level of function  Description: INTERVENTIONS:  1. Assess patient stability and activity tolerance for standing, transferring and ambulating w/ or w/o assistive devices  2. Assist with transfers and ambulation using safe practices  3. Ensure adequate protection for wounds/incisions during mobilization  4. Obtain PT/OT and other consults as needed  5. Apply Continuous Passive Motion per order to increase flexion toward goal  6. Instruct patient/family in ordered activity level  Outcome: Progressing  Flowsheets (Taken 8/15/2020 0828)  Return mobility to safest level of function:   Assess patient stability and activity tolerance for standing, transferring and ambulating with or without assistive devices   Ensure adequate protection for wounds/incisions during mobilization   Apply Continuous Passive Motion per order to increase flexion toward goal   Assist with transfers and ambulation using safe practices   Obtain PT/OT and other consults as needed   Instruct patient/family in ordered activity level  Goal: Maintain proper alignment of affected body part  Description: INTERVENTIONS:  1. Support and protect limb and body alignment per provider order  2. Instruct and reinforce with patient and family use of appropriate assistive device and precautions (e.g. spinal or hip dislocation precautions)  Outcome: Progressing  Flowsheets (Taken 8/15/2020 0828)  Maintain proper alignment of affected body part:   Support and protect limb  and body alignment per provider's orders   Instruct and reinforce with patient and family use of appropriate assistive device and precautions (e.g. spinal or hip dislocation precautions)  Goal: Return ADL status to a safe level of function  Description: INTERVENTIONS:  1. Assess patient's ADL deficits and provide assistive devices as needed  2. Obtain PT/OT consults as needed  3. Assist and instruct patient to increase activity and self care  Outcome: Progressing  Flowsheets (Taken 8/15/2020 0828)  Return activities of daily living status to a safe level of function:   Assess patient's activities of daily living deficits and provide assistive devices as needed   Obtain PT/OT consults as needed   Assist and instruct patient to increase activity and self care     Problem: Safety Adult - Fall  Goal: Free from fall injury  Description: INTERVENTIONS:    Inpatient - Please reference Cares/Safety Flowsheet under [place] Fall Risk for interventions.  Pediatrics - Please reference Peds Daily Cares/Safety Flowsheet under [facility] Pediatric Fall Assessment Fall Bundle for interventions  LD/OB - Please reference OB Shift Screening Flowsheet under OB Fall Risk for interventions.  Outcome: Progressing

## 2020-08-15 NOTE — PROGRESS NOTES
Patient Name: Nabila Booth  Admission Date: 8/10/2020   YOB: 1985  Date of Service: 8/15/2020    MRN: [id number]  Site: [place], [place]   Length of Stay: 5 Primary Physician: Pcp No   Code Status: Full Code  Attending Physician: Chhaya Hunt MD         Patient Summary: Nabila Booth is a 34 y.o. female with a PMHx of  admitted on 8/10/2020 for further eval & Tx    Temp:  [36.4 °C (97.5 °F)-37.2 °C (99 °F)] 36.9 °C (98.4 °F)  Heart Rate:  [] 88  Resp:  [16-20] 18  BP: (134-158)/(69-86) 158/81  Vitals:    08/14/20 1709 08/14/20 2200 08/15/20 0200 08/15/20 0521   BP: 144/69 150/79 134/86 158/81   BP Location: Right arm Right arm Right arm Right arm   Patient Position: Head of bed 30 degrees or higher Head of bed 30 degrees or higher Head of bed 30 degrees or higher Head of bed 30 degrees or higher   Cuff Size: Regular Adult Regular Adult Regular Adult Regular Adult   Pulse: 97 101 83 88   Resp: 16 20 18 18   Temp: 36.4 °C (97.5 °F) 36.8 °C (98.2 °F) 36.8 °C (98.2 °F) 36.9 °C (98.4 °F)   TempSrc: Oral Oral Oral Oral   SpO2: 100% 99% 98% 98%   Weight:       Height:           Subjective   Doing well, some discomfort.    Objective   Febrile, vital signs stable.  Right shoulder dressing clean dry and intact.  Right upper extremity neurovascularly intact.        Hemoglobin   Date Value Ref Range Status   08/15/2020 9.9 (L) 11.5 - 15.5 g/dL Final               Assessment   Hematocrit 29.9.  Doing well      Plan    To new mobilization and discharge planning.  Pain controlled.

## 2020-08-16 LAB
ANION GAP SERPL CALC-SCNC: 9 MMOL/L (ref 3–11)
BUN SERPL-MCNC: 10 MG/DL (ref 7–25)
CALCIUM SERPL-MCNC: 9 MG/DL (ref 8.6–10.3)
CHLORIDE SERPL-SCNC: 103 MMOL/L (ref 98–107)
CO2 SERPL-SCNC: 26 MMOL/L (ref 21–32)
CREAT SERPL-MCNC: 0.43 MG/DL (ref 0.6–1.1)
ERYTHROCYTE [DISTWIDTH] IN BLOOD BY AUTOMATED COUNT: 14.1 % (ref 11.5–14)
GFR SERPL CREATININE-BSD FRML MDRD: 134 ML/MIN/1.73M*2
GLUCOSE SERPL-MCNC: 110 MG/DL (ref 70–105)
HCT VFR BLD AUTO: 26.7 % (ref 34–45)
HGB BLD-MCNC: 9.1 G/DL (ref 11.5–15.5)
MAGNESIUM SERPL-MCNC: 1.7 MG/DL (ref 1.8–2.4)
MCH RBC QN AUTO: 31.2 PG (ref 28–33)
MCHC RBC AUTO-ENTMCNC: 34.1 G/DL (ref 32–36)
MCV RBC AUTO: 91.5 FL (ref 81–97)
PLATELET # BLD AUTO: 262 10*3/UL (ref 140–350)
PMV BLD AUTO: 7.4 FL (ref 6.9–10.8)
POTASSIUM SERPL-SCNC: 3.9 MMOL/L (ref 3.5–5.1)
RBC # BLD AUTO: 2.92 10*6/ΜL (ref 3.7–5.3)
SODIUM SERPL-SCNC: 138 MMOL/L (ref 135–145)
WBC # BLD AUTO: 10.2 10*3/UL (ref 4.5–10.5)

## 2020-08-16 PROCEDURE — 83735 ASSAY OF MAGNESIUM: CPT | Performed by: NURSE PRACTITIONER

## 2020-08-16 PROCEDURE — 2590000100 HC RX 259: Performed by: PHYSICIAN ASSISTANT

## 2020-08-16 PROCEDURE — 36415 COLL VENOUS BLD VENIPUNCTURE: CPT | Performed by: NURSE PRACTITIONER

## 2020-08-16 PROCEDURE — 1110000100 HC ROOM PRIVATE W TELE

## 2020-08-16 PROCEDURE — 85027 COMPLETE CBC AUTOMATED: CPT | Performed by: NURSE PRACTITIONER

## 2020-08-16 PROCEDURE — (BLANK) HC ROOM PRIVATE

## 2020-08-16 PROCEDURE — 2590000100 HC RX 259: Performed by: ORTHOPAEDIC SURGERY

## 2020-08-16 PROCEDURE — 99231 SBSQ HOSP IP/OBS SF/LOW 25: CPT | Performed by: PHYSICIAN ASSISTANT

## 2020-08-16 PROCEDURE — 6360000200 HC RX 636 W HCPCS (ALT 250 FOR IP): Performed by: PHYSICIAN ASSISTANT

## 2020-08-16 PROCEDURE — 6360000200 HC RX 636 W HCPCS (ALT 250 FOR IP): Performed by: NURSE PRACTITIONER

## 2020-08-16 PROCEDURE — 6370000100 HC RX 637 (ALT 250 FOR IP): Performed by: NURSE PRACTITIONER

## 2020-08-16 PROCEDURE — 2590000100 HC RX 259: Performed by: INTERNAL MEDICINE

## 2020-08-16 PROCEDURE — 6370000100 HC RX 637 (ALT 250 FOR IP): Performed by: INTERNAL MEDICINE

## 2020-08-16 PROCEDURE — 80048 BASIC METABOLIC PNL TOTAL CA: CPT | Performed by: NURSE PRACTITIONER

## 2020-08-16 RX ORDER — IBUPROFEN 600 MG/1
600 TABLET ORAL EVERY 6 HOURS SCHEDULED
Status: DISCONTINUED | OUTPATIENT
Start: 2020-08-17 | End: 2020-08-18 | Stop reason: HOSPADM

## 2020-08-16 RX ADMIN — ACETAMINOPHEN 650 MG: 325 TABLET ORAL at 11:47

## 2020-08-16 RX ADMIN — GABAPENTIN 300 MG: 300 CAPSULE ORAL at 20:52

## 2020-08-16 RX ADMIN — MULTIPLE VITAMINS W/ MINERALS TAB 1 TABLET: TAB at 12:50

## 2020-08-16 RX ADMIN — DOCUSATE SODIUM 200 MG: 100 CAPSULE, LIQUID FILLED ORAL at 20:52

## 2020-08-16 RX ADMIN — TAMSULOSIN HYDROCHLORIDE 0.4 MG: 0.4 CAPSULE ORAL at 18:28

## 2020-08-16 RX ADMIN — LIDOCAINE 1 PATCH: 560 PATCH PERCUTANEOUS; TOPICAL; TRANSDERMAL at 08:00

## 2020-08-16 RX ADMIN — DOCUSATE SODIUM 200 MG: 100 CAPSULE, LIQUID FILLED ORAL at 08:00

## 2020-08-16 RX ADMIN — KETOROLAC TROMETHAMINE 15 MG: 15 INJECTION, SOLUTION INTRAMUSCULAR; INTRAVENOUS at 05:44

## 2020-08-16 RX ADMIN — SODIUM CHLORIDE 25 ML: 9 INJECTION, SOLUTION INTRAVENOUS at 20:52

## 2020-08-16 RX ADMIN — POLYETHYLENE GLYCOL 3350 17 G: 17 POWDER, FOR SOLUTION ORAL at 08:00

## 2020-08-16 RX ADMIN — KETOROLAC TROMETHAMINE 15 MG: 15 INJECTION, SOLUTION INTRAMUSCULAR; INTRAVENOUS at 11:47

## 2020-08-16 RX ADMIN — OXYCODONE HYDROCHLORIDE 5 MG: 5 TABLET ORAL at 20:52

## 2020-08-16 RX ADMIN — Medication 8.6 MG: at 20:52

## 2020-08-16 RX ADMIN — ACETAMINOPHEN 650 MG: 325 TABLET ORAL at 18:28

## 2020-08-16 RX ADMIN — GABAPENTIN 300 MG: 300 CAPSULE ORAL at 14:12

## 2020-08-16 RX ADMIN — GABAPENTIN 300 MG: 300 CAPSULE ORAL at 08:00

## 2020-08-16 RX ADMIN — DOCUSATE SODIUM 50MG AND SENNOSIDES 8.6MG 1 TABLET: 8.6; 5 TABLET, FILM COATED ORAL at 08:00

## 2020-08-16 RX ADMIN — ENOXAPARIN SODIUM 40 MG: 40 INJECTION SUBCUTANEOUS at 14:12

## 2020-08-16 RX ADMIN — ACETAMINOPHEN 650 MG: 325 TABLET ORAL at 05:44

## 2020-08-16 RX ADMIN — Medication 8.6 MG: at 08:00

## 2020-08-16 RX ADMIN — MORPHINE SULFATE: 1 INJECTION INTRAVENOUS at 16:42

## 2020-08-16 RX ADMIN — DOCUSATE SODIUM 50MG AND SENNOSIDES 8.6MG 1 TABLET: 8.6; 5 TABLET, FILM COATED ORAL at 20:52

## 2020-08-16 RX ADMIN — KETOROLAC TROMETHAMINE 15 MG: 15 INJECTION, SOLUTION INTRAMUSCULAR; INTRAVENOUS at 18:28

## 2020-08-16 NOTE — PLAN OF CARE
Problem: Knowledge Deficit  Goal: Patient/family/caregiver demonstrates understanding of disease process, treatment plan, medications, and discharge instructions  Description: INTERVENTIONS:   1. Complete learning assessment and assess knowledge base  2. Provide teaching at level of understanding   3. Provide teaching via preferred learning methods  Outcome: Progressing  Flowsheets (Taken 8/16/2020 1308)  Patient/family/caregiver demonstrates understanding of disease process, treatment plan, medications, and discharge instructions:   Complete learning assessment and assess knowledge base   Provide teaching at level of understanding   Provide teaching via preferred learning methods     Problem: Potential for Compromised Skin Integrity  Goal: Skin Integrity is Maintained or Improved  Description: INTERVENTIONS:  1. Assess and monitor skin integrity  2. Collaborate with interdisciplinary team and initiate plans and interventions as needed  3. Alternate a full bath with partial baths for elderly   4. Monitor patient's hygiene practices   5. Collaborate with wound, ostomy, and continence nurse  Outcome: Progressing  Flowsheets (Taken 8/16/2020 1308)  Skin integrity is maintained or improved:   Assess and monitor skin integrity   Collaborate with interdisciplinary team and initiate plans and interventions as needed   Alternate a full bath with partial baths for elderly   Monitor patient's hygiene practices   Collaborate with wound, ostomy, and continence nurse  Goal: Nutritional status is improving  Description: INTERVENTIONS:  1. Monitor and assess patient for malnutrition (ex- brittle hair, bruises, dry skin, pale skin and conjunctiva, muscle wasting, smooth red tongue, and disorientation)  2. Monitor patient's weight and dietary intake as ordered or per policy  3. Determine patient's food preferences and provide high-protein, high-caloric foods as appropriate  4. Assist patient with eating   5. Allow adequate time  for meals   6. Encourage patient to take dietary supplement as ordered   7. Collaborate with dietitian  8. Include patient/family/caregiver in decisions related to nutrition  Outcome: Progressing  Flowsheets (Taken 8/16/2020 1308)  Nutritional status is improving:   Monitor and assess patient for malnutrition (ex- brittle hair, bruises, dry skin, pale skin and conjunctiva, muscle wasting, smooth red tongue, and disorientation)   Monitor patient's weight and dietary intake as ordered or per policy   Determine patient's food preferences and provide high-protein, high-caloric foods as appropriate   Assist patient with eating   Allow adequate time for meals   Encourage patient to take dietary supplement as ordered   Collaborate with dietitian   Include patient/family/caregiver in decisions related to nutrition  Goal: MOBILITY IS MAINTAINED OR IMPROVED  Description: INTERVENTIONS  1. Collaborate with interdisciplinary team and initiate plan and interventions as ordered (PT/OT)  2. Encourage ambulation  3. Up to chair for meals  4. Monitor for signs of deconditioning  Outcome: Progressing  Flowsheets (Taken 8/16/2020 1308)  Mobility is Maintained or Improved:   Collaborate with interdisciplinary team and initiate plan and interventions as ordered (PT/OT)   Encourage ambulation   Up to chair for meals   Monitor for signs of deconditioning     Problem: Neurosensory - Adult  Goal: Achieves stable or improved neurological status  Description: INTERVENTIONS  1. Assess for and report changes in neurological status  2. Initiate measures to prevent increased intracranial pressure  3. Maintain blood pressure and fluid volume within ordered parameters to optimize cerebral perfusion and minimize risk of hemorrhage  4. Monitor temperature, glucose, and sodium. Initiate appropriate interventions as ordered  Outcome: Progressing  Flowsheets (Taken 8/16/2020 1308)  Achieves stable or improved neurological status:   Assess for and report  changes in neurological status   Initiate measures to prevent increased intracranial pressure   Maintain blood pressure and fluid volume within ordered parameters to optimize cerebral perfusion and minimize risk of hemorrhage   Monitor temperature, glucose, and sodium. Initiate appropriate interventions as ordered  Goal: Absence of seizures  Description: INTERVENTIONS  1. Monitor for seizure activity  2. Administer anti-seizure medications as ordered  3. Monitor neurological status  4. Assist patient in avoiding triggers, if identified  Outcome: Progressing  Flowsheets (Taken 8/16/2020 1308)  Absence of seizures:   Monitor for seizure activity   Administer anti-seizure medications as ordered   Monitor neurological status   Assist patient in avoiding triggers, if identified  Goal: Remains free of injury related to seizures activity  Description: INTERVENTIONS  1. Maintain airway, patient safety  and administer oxygen as ordered  2. Monitor patient for seizure activity, document and report duration and description of seizure to provider  3. If seizure occurs, turn patient to side and suction secretions as needed  4. Reorient patient post seizure  5. Seizure pads on all 4 side rails  6. Instruct patient/family to notify RN of any seizure activity  7. Instruct patient/family to call for assistance with activity based on assessment  Outcome: Progressing  Flowsheets (Taken 8/16/2020 1308)  Remains free of injury related to seizure activity:   Maintain airway, patient safety  and administer oxygen as ordered   Monitor patient for seizure activity, document and report duration and description of seizure to provider   If seizure occurs, turn patient to side and suction secretions as needed   Reorient patient post seizure   Seizure pads on all 4 side rails   Instruct patient/family to notify RN of any seizure activity   Instruct patient/family to call for assistance with activity based on assessment  Goal: Achieves maximal  functionality and self care  Description: INTERVENTIONS  1. Monitor swallowing and airway patency with patient fatigue and changes in neurological status  2. Encourage and assist patient to increase activity and self care with guidance from PT/OT  3. Encourage visually impaired, hearing impaired and aphasic patients to use assistive/communication devices  Outcome: Progressing  Flowsheets (Taken 8/16/2020 1308)  Achieves maximal functionality and self care:   Monitor swallowing and airway patency with patient fatigue and changes in neurological status   Encourage and assist patient to increase activity and self care with guidance from PT/OT   Encourage visually impaired, hearing impaired and aphasic patients to use assistive/communication devices     Problem: Cardiovascular - Adult  Goal: Maintains optimal cardiac output and hemodynamic stability  Description: INTERVENTIONS:  1. Monitor vital signs and rhythm  2. Monitor for hypotension and other signs of decreased cardiac output  3. Administer and titrate ordered vasoactive medications to optimize hemodynamic stability  4. Monitor for fluid overload/dehydration, weight gain, shortness of breath and activity intolerance  5. Monitor arterial and/or venous puncture sites for bleeding and/or hematoma  6. Assess quality of pulses, capillary refill, edema, sensation, skin color and temperature  7. Assess for signs of decreased coronary artery perfusion - ex. angina  Outcome: Progressing  Flowsheets (Taken 8/16/2020 1308)  Maintain optimal cardiac output and hemodynamic stability:   Monitor vital signs and rhythm   Monitor for hypotension and other signs of decreased cardiac output   Administer and titrate ordered vasoactive medications to optimize hemodynamic stability   Monitor for fluid overload/dehydration, weight gain, shortness of breath and activity intolerance   Monitor arterial and/or venous puncture sites for bleeding and/or hematoma   Assess quality of pulses,  capillary refill, edema, sensation, skin color and temperature   Assess for signs of decreased coronary artery perfusion - ex. angina  Goal: Absence of cardiac dysrhythmias or at baseline  Description: INTERVENTIONS:  1. Continuous cardiac monitoring, monitor vital signs, obtain 12 lead EKG if indicated  2. Administer antiarrhythmic and heart rate control medications as ordered  3. Initiate emergency measures for life threatening arrhythmias  4. Monitor electrolytes and administer replacement therapy as ordered  Outcome: Progressing  Flowsheets (Taken 8/16/2020 1308)  Absence of cardiac dysrhythmias or at baseline:   Continuous cardiac monitoring, monitor vital signs, obtain 12 lead EKG if indicated   Administer antiarrhythmic and heart rate control medications as ordered   Initiate emergency measures for life threatening arrhythmias   Monitor electrolytes and administer replacement therapy as ordered     Problem: Respiratory - Adult  Goal: Achieves optimal ventilation and oxygenation  Description: INTERVENTIONS:  1. Assess for changes in respiratory status  2. Assess for changes in mentation and behavior  3. Position to facilitate oxygenation and minimize respiratory effort  4. Oxygen supplementation based on oxygen saturation or ABGs  5. Assess patient's ability to cough effectively  6. Encourage broncho-pulmonary hygiene including cough, deep breathe  7. Assess the need for suctioning   8. Assess and instruct to report SOB or any respiratory difficulty  9. Respiratory Therapy support as indicated, including medications and treatment.  Outcome: Progressing  Flowsheets (Taken 8/16/2020 1308)  Achieves optimal ventilation and oxygenation:   Assess for changes in respiratory status   Assess for changes in mentation and behavior   Position to facilitate oxygenation and minimize respiratory effort   Oxygen supplementation based on oxygen saturation or arterial blood gases   Assess patient's ability to cough  effectively   Encourage broncho-pulmonary hygiene including cough, deep breathe   Assess the need for suctioning   Assess and instruct to report shortness of breath or any respiratory difficulty   Respiratory Therapy support as indicated, including medications and treatment     Problem: Gastrointestinal - Adult  Goal: Minimal or absence of nausea and vomiting  Description: INTERVENTIONS:  1. Ensure adequate hydration  2. Monitor intake and output  3. Maintain NPO status until nausea and vomiting are resolved  4. Nasogastric tube to low intermittent suction as ordered  5. Administer ordered antiemetic medications as needed  6. Provide nonpharmacologic comfort measures as appropriate  7. Advance diet as ordered  8. Nutrition consult as indicated   Outcome: Progressing  Flowsheets (Taken 8/16/2020 1308)  Minimal or absence of nausea and vomiting:   Ensure adequate hydration   Monitor intake and output   Administer ordered antiemetic medications as needed   Provide nonpharmacologic comfort measures as appropriate   Advance diet as ordered   Nutrition consult as indicated  Goal: Maintains or returns to baseline digestive function  Description: INTERVENTIONS:  1. Assess bowel function  2. Ensure adequate hydration  3. Administer ordered medications as needed  4. Encourage mobilization and activity  5. Nutrition consult as indicated  6. Assess hydration and nutritional status  7. Assess characteristics and frequency of stool  8. Monitor for metabolic panel imbalances  9. Assess for treatment effectiveness  Outcome: Progressing  Flowsheets (Taken 8/16/2020 1308)  Maintains or returns to baseline bowel function:   Assess bowel function   Administer ordered medications as needed   Nutrition consult as indicated   Encourage mobilization and activity   Ensure adequate hydration   Monitor for metabolic panel imbalances   Assess hydration and nutritional status   Assess characteristics and frequency of stool   Assess for  treatment effectiveness  Goal: Maintains adequate nutritional intake  Description: INTERVENTIONS:  1. Monitor percentage of each meal consumed  2. Identify factors contributing to decreased intake, treat as appropriate  3. Assist with meals as needed  4. Monitor I&O, weight and lab values  5. Obtain nutritional consult as indicated  6. Administer alternative nutrition interventions as ordered  Outcome: Progressing  Flowsheets (Taken 8/16/2020 1308)  Maintains adequate nutritional intake:   Monitor percentage of each meal consumed   Identify factors contributing to decreased intake, treat as appropriate   Assist with meals as needed   Monitor I&O, weight and lab values   Obtain nutritional consult as indicated   Administer alternative nutrition interventions as ordered  Goal: Maintains Optimal Tissue Perfusion  Description: INTERVENTIONS:  1. Monitor for increased abdominal girth, firmness or intra-abdominal pressure  2. Monitor nutritional intake, intake/output, and weight  3. Assess for signs of dehydration  4. Assess blood pressure, heart rate, and oxygen saturation  5. Assess skin color, capillary refill and edema  6. Monitor metabolic panels, blood count panels, and coagulations panels as ordered  7. Assess bowel function  8. Assess for blood in emesis and stool  Outcome: Progressing  Flowsheets (Taken 8/16/2020 1308)  Maintains Optimal Tissue Perfusion:   Monitor for increased abdominal girth, firmness or intra-abdominal pressure   Monitor nutritional intake, intake/output, and weight   Assess for signs of dehydration   Assess blood pressure, heart rate, and oxygen saturation   Assess skin color, capillary refill and edema   Monitor metabolic panels, blood count panels, and coagulations panels as ordered   Assess bowel function   Assess for blood in emesis and stool  Goal: Nutrient intake appropriate for improving, restoring or maintaining nutritional needs  Description: INTERVENTIONS:  1. Assess nutritional  status  2. Monitor oral intake, labs, and treatment plans  3. Provide appropriate diets, oral nutritional supplements, and vitamin/mineral supplements  4. Monitor, and adjust tube feedings and TPN/PPN based on assessed needs  5. Provide specific nutrition education as appropriate  Outcome: Progressing  Flowsheets (Taken 8/16/2020 1308)  Nutrient intake appropriate for improving, restoring or maintaining nutritional needs:   Assess nutritional status   Monitor oral intake, labs, and treatment plans   Provide appropriate diets, oral nutritional supplements, and vitamin/mineral supplements   Monitor, and adjust tube feedings and TPN/PPN based on assessed needs   Provide specific nutrition education as appropriate     Problem: Genitourinary - Adult  Goal: Absence of urinary retention  Description: INTERVENTIONS:  1. Assess patient’s ability to void and empty bladder.  2. Monitor intake/output and perform bladder scan if indicated.  3. Place urinary catheter per order and initiate and maintain CAUTI bundle.  4. Administer medications to alleviate retention as needed.  5. Discuss catheterization for long term situations as appropriate.    Outcome: Progressing  Flowsheets (Taken 8/16/2020 1308)  Absence of urinary retention:   Assess patient’s ability to void and empty bladder   Monitor intake/output and perform bladder scan if indicated   Place urinary catheter per order and initiate and maintain CAUTI bundle.   Administer medications to alleviate retention as needed   Discuss catheterization for long term situations as appropriate  Goal: Urinary catheter remains patent  Description: INTERVENTIONS:  1. Assess patency of urinary catheter.  2. Irrigate catheter per order if indicated and notify provider if unable to irrigate.  3. Assess need for a larger catheter size or a 3-way catheter for continuous bladder irrigation.  Outcome: Progressing  Flowsheets (Taken 8/16/2020 1308)  Urinary catheter remains patent:   Assess  patency of urinary catheter   Irrigate catheter per order if indicated and notify provider if unable to irrigate   Assess need for a larger catheter size or a 3-way catheter for continuous bladder irrigation  Goal: Absence of Urinary Infection  Description: INTERVENTIONS:  1. Assess urinary status for burning, urgency, frequency, and pain  2. Assess urine for color, cloudiness, odor, and amount  3. Monitor intake/output  4. Monitor lab values  5. Obtain urinalysis as ordered  6. Assess for neurological status changes    Outcome: Progressing  Flowsheets (Taken 8/16/2020 1308)  Absence of Urinary Infection:   Assess urinary status for burning, urgency, frequency, and pain   Assess urine for color, cloudiness, odor, and amount   Monitor intake/output   Monitor lab values   Obtain urinalysis as ordered   Assess for neurological status changes     Problem: Metabolic/Fluid and Electrolytes - Adult  Goal: Electrolytes maintained within normal limits  Description: INTERVENTIONS:  1. Monitor labs and assess patient for signs and symptoms of electrolyte imbalances  2. Administer electrolyte replacement as ordered  3. Monitor response to electrolyte replacements, including repeat lab results as appropriate  4. Fluid restriction as ordered  5. Instruct patient on fluid and nutrition restrictions as appropriate  Outcome: Progressing  Flowsheets (Taken 8/16/2020 1308)  Electrolytes maintained within normal limits:   Monitor labs and assess patient for signs and symptoms of electrolyte imbalances   Administer electrolyte replacement as ordered   Monitor response to electrolyte replacements, including repeat lab results as appropriate   Fluid restriction as ordered   Instruct patient on fluid and nutrition restrictions as appropriate  Goal: Maintain Optimal Renal Function and Hemodynamic Stability  Description: INTERVENTIONS:  1. Monitor labs and assess for signs and symptoms of volume excess or deficit  2. Monitor intake, output  and patient weight  3. Monitor urine specific gravity, serum osmolarity and serum sodium as indicated or ordered  4. Monitor response to interventions for patient's volume status, including labs, urine output, blood pressure (other measures as available)  5. Encourage oral intake as appropriate  6. Instruct patient on fluid and nutrition restrictions as appropriate  Outcome: Progressing  Flowsheets (Taken 8/16/2020 1308)  Maintain optimal renal function and [facility] stability:   Monitor labs and assess for signs and symptoms of volume excess or deficit   Monitor intake, output and patient weight   Monitor urine specific gravity, serum osmolarity and serum sodium as indicated or ordered   Monitor response to interventions for patient's volume status, including labs, urine output, blood pressure (other measures as available)   Encourage oral intake as appropriate   Instruct patient on fluid and nutrition restrictions as appropriate  Goal: Glucose maintained within prescribed range  Description: INTERVENTIONS:  1. Monitor blood glucose as ordered  2. Assess for signs and symptoms of hyperglycemia and hypoglycemia  3. Administer ordered medications to maintain glucose within target range  4. Assess barriers to adequate nutritional intake and initiate nutrition consult as needed  5. Assess baseline knowledge and provide education as indicated  6. Monitor exercise as may reduce the requirements for insulin  Outcome: Progressing  Flowsheets (Taken 8/16/2020 1308)  Glucose maintained within prescribed range:   Monitor blood glucose as ordered   Assess for signs and symptoms of hyperglycemia and hypoglycemia   Administer ordered medications to maintain glucose within target range   Assess barriers to adequate nutritional intake and initiate nutrition consult as needed   Assess baseline knowledge and provide education as indicated   Monitor exercise as may reduce the requirements for insulin     Problem: Skin/Tissue  Integrity - Adult  Goal: Skin integrity remains intact  Description: INTERVENTIONS  1. Assess and document risk factors for pressure ulcer development  2. Assess and document skin integrity  3. Monitor for areas of redness and/or skin breakdown  4. Initiate pressure ulcer prevention measures as indicated  Outcome: Progressing  Flowsheets (Taken 8/16/2020 1308)  Skin integrity remains intact:   Assess and document risk factors for pressure ulcer development   Assess and document skin integrity   Monitor for areas of redness and/or skin breakdown   Initiate pressure ulcer prevention measures as indicated  Goal: Incisions, wounds, or drain sites healing without S/S of infection  Description: INTERVENTIONS  1. Assess and document risk factors for skin breakdown  2. Assess and document skin integrity  3. Assess and document dressing/incision, wound bed, drain sites and surrounding tissue  4. Implement wound care per orders  Outcome: Progressing  Flowsheets (Taken 8/16/2020 1308)  Incision(s), wound(s) or drain site(s) healing without S/S of infection:   Assess and document risk factors for skin breakdown   Assess and document skin integrity   Assess and document dressing/incision, wound bed, drain sites and surrounding tissue   Implement wound care per orders  Goal: Oral mucous membranes remain intact  Description: INTERVENTIONS  1. Assess oral mucosa and hygiene practices  2. Implement preventative oral hygiene regimen  3. Implement oral medicated treatments as ordered  Outcome: Progressing  Flowsheets (Taken 8/16/2020 1308)  Oral mucous membranes remain intact:   Assess oral mucosa and hygiene practices   Implement preventative oral hygiene regimen   Implement oral medicated treatments as ordered     Problem: Hematologic - Adult  Goal: Maintains hematologic stability  Description: INTERVENTIONS  1. Assess for signs and symptoms of bleeding or hemorrhage  2. Monitor labs  3. Administer supportive blood products/factors  as ordered and appropriate  4. Administer medications as ordered  5. Initiate bleeding precautions as indicated  6. Educate patient/family to report signs/symptoms of bleeding  Outcome: Progressing  Flowsheets (Taken 8/16/2020 1308)  Maintains hematologic stability:   Assess for signs and symptoms of bleeding or hemorrhage   Monitor labs   Administer supportive blood products/factors as ordered and appropriate   [place] medications as ordered   Initiate bleeding precautions as indicated   Educate patient/family to report signs/symptoms of bleeding     Problem: Musculoskeletal - Adult  Goal: Return mobility to safest level of function  Description: INTERVENTIONS:  1. Assess patient stability and activity tolerance for standing, transferring and ambulating w/ or w/o assistive devices  2. Assist with transfers and ambulation using safe practices  3. Ensure adequate protection for wounds/incisions during mobilization  4. Obtain PT/OT and other consults as needed  5. Apply Continuous Passive Motion per order to increase flexion toward goal  6. Instruct patient/family in ordered activity level  Outcome: Progressing  Flowsheets (Taken 8/16/2020 1308)  Return mobility to safest level of function:   Assess patient stability and activity tolerance for standing, transferring and ambulating with or without assistive devices   Assist with transfers and ambulation using safe practices   Ensure adequate protection for wounds/incisions during mobilization   Obtain PT/OT and other consults as needed   Apply Continuous Passive Motion per order to increase flexion toward goal   Instruct patient/family in ordered activity level  Goal: Maintain proper alignment of affected body part  Description: INTERVENTIONS:  1. Support and protect limb and body alignment per provider order  2. Instruct and reinforce with patient and family use of appropriate assistive device and precautions (e.g. spinal or hip dislocation precautions)  Outcome:  Progressing  Flowsheets (Taken 8/16/2020 1308)  Maintain proper alignment of affected body part:   Support and protect limb and body alignment per provider's orders   Instruct and reinforce with patient and family use of appropriate assistive device and precautions (e.g. spinal or hip dislocation precautions)  Goal: Return ADL status to a safe level of function  Description: INTERVENTIONS:  1. Assess patient's ADL deficits and provide assistive devices as needed  2. Obtain PT/OT consults as needed  3. Assist and instruct patient to increase activity and self care  Outcome: Progressing  Flowsheets (Taken 8/16/2020 1308)  Return activities of daily living status to a safe level of function:   Assess patient's activities of daily living deficits and provide assistive devices as needed   Obtain PT/OT consults as needed   Assist and instruct patient to increase activity and self care     Problem: Safety Adult - Fall  Goal: Free from fall injury  Description: INTERVENTIONS:    Inpatient - Please reference Cares/Safety Flowsheet under [place] Fall Risk for interventions.  Pediatrics - Please reference Peds Daily Cares/Safety Flowsheet under [facility] Pediatric Fall Assessment Fall Bundle for interventions  LD/OB - Please reference OB Shift Screening Flowsheet under OB Fall Risk for interventions.  Outcome: Progressing
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
## 2020-08-16 NOTE — NURSING END OF SHIFT
Nursing End of Shift Summary:    Patient: Nabila Booth  MRN: [id number]  : 1985, Age: 34 y.o.    Location: [address] [place] [address]    Nursing Goals  Clinical Goals for the Shift: monitor PCA, maintain comfort and safety, monitor emotional health    Narrative Summary of Progress Toward Clinical Goals:  Patient rested well throughout the day.  Patient's PCA dose was adjusted.  Patient was up in the chair for a few hours today and went to visit her  in 534.  Patient did go back to bed after the visit and needs to go back to the chair for supper.  Patient ate well and drinks very well during the day.  Patient has been up pivoting to the commode to urinate.  Patient did not have any complaints of nausea or vomiting, but still has not had a bowel movement since admission.  Patient has been emotional on and off throughout the day, and this was discussed with KANCHAN Gresham upon her visit to the pod/patient.  New phone requested for her room, because the phone does not ring.  The phone works but it does not ring and her friends and family absolutely want to call her and cheer her on from afar to support her in recovery but the phone doesn't ring when they call.    Barriers to Goals/Nursing Concerns:  Yes - mobility, rehab    New Patient or Family Concerns/Issues:  No - no concerns today; mother-in-law Nat called twice today to check in on the patient    Shift Summary:      Significant Events & Communications to Providers (last 12 hours)      Last 5 Values    No documentation.              Oxygen Usage (last 12 hours)      Last 5 Values    No documentation.              Mobility (last 12 hours)      Last 5 Values     Row Name 20 0715 20 0800 20 1000 20 1245          Mobility    Activity  Turn  Chair;Turn  Turn  Chair     Level of Assistance  --  Maximum assist, patient does 25-49%  --  Moderate assist, patient does 50-74%     Anti-Embolism Devices  --  Bilateral;AE calf pump  --  --      Anti-Embolism Intervention  --  On  --  --         Urethral Catheter    Active Urethral Catheter     None            Active Lines    Active Central venous catheter / Peripherally inserted central catheter / Implantable Port / Hemodialysis catheter / Midline Catheter     None              Infusing Medications   Medication Dose Last Rate   • morphine         PRN Medications   Medication Dose Last Dose   • bisacodyL  10 mg     • cyclobenzaprine  10 mg 10 mg at 08/14/20 0837   • naloxone  0.2 mg     • magnesium hydroxide  30 mL     • oxyCODONE  5-10 mg 5 mg at 08/14/20 1319   • sodium chloride  3 mL     • ondansetron  4 mg 4 mg at 08/12/20 1956    Or   • ondansetron  4 mg 4 mg at 08/10/20 2223   • sodium chloride 0.9% (NS)  25-50 mL       _________________________  Samia Gonzalez RN  08/16/20 5:49 PM

## 2020-08-16 NOTE — NURSING END OF SHIFT
ICU Nursing Shift Summary:    Patient: Nabila Booth  MRN: [id number]  : 1985, Age: 34 y.o.    Location: [address] [place] [address]    Admit date: 8/10/2020  Primary Care Provider: Pcp No  Attending Provider: Mehran Sierra MD    20    Nursing Goals    Clinical Goals for the Shift: Monitor PCA, remain safe and comfortable, emotional health    Narrative Summary of Progress Towards Clinical Goals:  Patient had adequate pain control in the night via PCA. Patient became extremely tearful in the am over the health of her  and very anxious about how she is going to get back to [place] and what is going to happen next. RN offered emotional support and let the patient know that staff will help make arrangements for her to get back home. RN also let the patient know that the doctors will round later this am and they can answer more questions about when discharge can be expected.     Nursing Concerns/Thoughts for MD:  Yes- patient is very anxious and restless over situation- possible to start antianxiety medication?    New Patient or Family Concerns/Issues:  Yes- discharge planning            Significant Events & Communications to Providers (last 12 hours)      Last 5 Values    No documentation.              Oxygen Usage (last 12 hours)      Last 5 Values     Row Name 08/15/20 2120 20 0100 20 0101 20 0102 20 0103       Oxygen Therapy    SpO2  90 %  97 %  97 %  98 %  99 %    Row Name 20 0104 20 0105 20 0106 20 0146 20 0147       Oxygen Therapy    SpO2  100 %  100 %  99 %  92 %  --    O2 Delivery Interface  --  --  --  Oxymizer  --    O2 Flow Rate (L/min)  --  --  --  1 L/min  --    Row Name 20 0148 20 0541                Oxygen Therapy    SpO2  --  95 %                  Mobility (last 12 hours)      Last 5 Values     Row Name 08/15/20 1903 08/15/20 2000                Mobility    Activity  Turn;Sleeping  --       Level of Assistance  Maximum assist, patient  does 25-49%  --       Anti-Embolism Devices  --  Bilateral;AE calf pump       Anti-Embolism Intervention  --  On           Urethral Catheter    Active Urethral Catheter     None            Active Lines    Active Central venous catheter / Peripherally inserted central catheter / Implantable Port / Hemodialysis catheter / Midline Catheter     None              Infusing Medications   Medication Dose Last Rate   • morphine           Current LOC: General Med/Surg    Tabby Cifuentes RN  08/16/20 6:00 AM

## 2020-08-16 NOTE — NURSING END OF SHIFT
Nursing End of Shift Summary:    Patient: Nabila Booth  MRN: [id number]  : 1985, Age: 34 y.o.    Location: [address] [place] [address]    Nursing Goals  Clinical Goals for the Shift: maintain comfort and safety, monitor CO2 and PCA, monitor emotional health ( is in ICU)    Narrative Summary of Progress Toward Clinical Goals:  Comfort and safety maintained.  Patient was up to the wheelchair with PT and moved to TICU to visit her  for a short time with the ONC CRN.  Patient's pain controlled with PCA, IV and oral pain meds.  Patient cleaned up and hair washed.  Purewick changed this morning ~9am.  Patient was appropriate with questions, did need assist with eating and drinking.  Patient does not want to leave her PCA cannula in, it is tegadermed to help it stay in place.  Patient did not complain of nausea or vomiting.    Barriers to Goals/Nursing Concerns:  Yes - mobility, rehab    New Patient or Family Concerns/Issues:  Yes - family is concerned about length of stay and their travel back and forth with her and Abdoulaye ( in 534) both here and hospitalized and they are also caring their children    Shift Summary:      Significant Events & Communications to Providers (last 12 hours)      Last 5 Values    No documentation.              Oxygen Usage (last 12 hours)      Last 5 Values     Row Name 08/15/20 1600                   Oxygen Weaning Trial by Nursing    Is Patient on Room Air OR on the Same Amount of O2 as at Home?  Yes                   Mobility (last 12 hours)      Last 5 Values     Row Name 08/15/20 0800 08/15/20 1200 08/15/20 1903             Mobility    Activity  Bedrest  Chair  Turn;Sleeping      Level of Assistance  Maximum assist, patient does 25-49%  --  Maximum assist, patient does 25-49%      Anti-Embolism Devices  Bilateral;AE calf pump lovenox  --  --      Anti-Embolism Intervention  On  --  --          Urethral Catheter    Active Urethral Catheter     None            Active Lines     Active Central venous catheter / Peripherally inserted central catheter / Implantable Port / Hemodialysis catheter / Midline Catheter     None              Infusing Medications   Medication Dose Last Rate   • morphine         PRN Medications   Medication Dose Last Dose   • bisacodyL  10 mg     • cyclobenzaprine  10 mg 10 mg at 08/14/20 0837   • naloxone  0.2 mg     • magnesium hydroxide  30 mL     • oxyCODONE  5-10 mg 5 mg at 08/14/20 1319   • sodium chloride  3 mL     • ondansetron  4 mg 4 mg at 08/12/20 1956    Or   • ondansetron  4 mg 4 mg at 08/10/20 2223   • sodium chloride 0.9% (NS)  25-50 mL       _________________________  Samia Gonzalez RN  08/15/20 7:27 PM

## 2020-08-17 ENCOUNTER — APPOINTMENT (OUTPATIENT)
Dept: CT IMAGING | Facility: HOSPITAL | Age: 35
DRG: 957 | End: 2020-08-17
Payer: COMMERCIAL

## 2020-08-17 LAB
ANION GAP SERPL CALC-SCNC: 10 MMOL/L (ref 3–11)
BUN SERPL-MCNC: 12 MG/DL (ref 7–25)
CALCIUM SERPL-MCNC: 9.1 MG/DL (ref 8.6–10.3)
CHLORIDE SERPL-SCNC: 104 MMOL/L (ref 98–107)
CO2 SERPL-SCNC: 24 MMOL/L (ref 21–32)
CREAT SERPL-MCNC: 0.39 MG/DL (ref 0.6–1.1)
ERYTHROCYTE [DISTWIDTH] IN BLOOD BY AUTOMATED COUNT: 13.5 % (ref 11.5–14)
GFR SERPL CREATININE-BSD FRML MDRD: 138 ML/MIN/1.73M*2
GLUCOSE SERPL-MCNC: 95 MG/DL (ref 70–105)
HCT VFR BLD AUTO: 26.6 % (ref 34–45)
HGB BLD-MCNC: 9.2 G/DL (ref 11.5–15.5)
MAGNESIUM SERPL-MCNC: 1.8 MG/DL (ref 1.8–2.4)
MCH RBC QN AUTO: 31.8 PG (ref 28–33)
MCHC RBC AUTO-ENTMCNC: 34.4 G/DL (ref 32–36)
MCV RBC AUTO: 92.5 FL (ref 81–97)
PLATELET # BLD AUTO: 285 10*3/UL (ref 140–350)
PMV BLD AUTO: 7.7 FL (ref 6.9–10.8)
POTASSIUM SERPL-SCNC: 3.9 MMOL/L (ref 3.5–5.1)
RBC # BLD AUTO: 2.88 10*6/ΜL (ref 3.7–5.3)
SODIUM SERPL-SCNC: 138 MMOL/L (ref 135–145)
WBC # BLD AUTO: 8.4 10*3/UL (ref 4.5–10.5)

## 2020-08-17 PROCEDURE — 83735 ASSAY OF MAGNESIUM: CPT | Performed by: NURSE PRACTITIONER

## 2020-08-17 PROCEDURE — 6370000100 HC RX 637 (ALT 250 FOR IP): Performed by: NURSE PRACTITIONER

## 2020-08-17 PROCEDURE — 2590000100 HC RX 259: Performed by: PHYSICIAN ASSISTANT

## 2020-08-17 PROCEDURE — 6370000100 HC RX 637 (ALT 250 FOR IP): Performed by: PHYSICIAN ASSISTANT

## 2020-08-17 PROCEDURE — 6360000200 HC RX 636 W HCPCS (ALT 250 FOR IP): Performed by: NURSE PRACTITIONER

## 2020-08-17 PROCEDURE — 36415 COLL VENOUS BLD VENIPUNCTURE: CPT | Performed by: NURSE PRACTITIONER

## 2020-08-17 PROCEDURE — 80048 BASIC METABOLIC PNL TOTAL CA: CPT | Performed by: NURSE PRACTITIONER

## 2020-08-17 PROCEDURE — 6370000100 HC RX 637 (ALT 250 FOR IP): Performed by: INTERNAL MEDICINE

## 2020-08-17 PROCEDURE — 6360000200 HC RX 636 W HCPCS (ALT 250 FOR IP): Performed by: PHYSICIAN ASSISTANT

## 2020-08-17 PROCEDURE — G1004 CDSM NDSC: HCPCS

## 2020-08-17 PROCEDURE — 2590000100 HC RX 259: Performed by: ORTHOPAEDIC SURGERY

## 2020-08-17 PROCEDURE — 85027 COMPLETE CBC AUTOMATED: CPT | Performed by: NURSE PRACTITIONER

## 2020-08-17 PROCEDURE — 2590000100 HC RX 259: Performed by: INTERNAL MEDICINE

## 2020-08-17 PROCEDURE — 99231 SBSQ HOSP IP/OBS SF/LOW 25: CPT | Performed by: PHYSICIAN ASSISTANT

## 2020-08-17 PROCEDURE — (BLANK) HC ROOM PRIVATE

## 2020-08-17 RX ORDER — DEXTROMETHORPHAN POLISTIREX 30 MG/5 ML
1 SUSPENSION, EXTENDED RELEASE 12 HR ORAL ONCE
Status: DISCONTINUED | OUTPATIENT
Start: 2020-08-17 | End: 2020-08-18

## 2020-08-17 RX ORDER — OXYCODONE HYDROCHLORIDE 5 MG/1
5-15 TABLET ORAL EVERY 4 HOURS PRN
Status: DISCONTINUED | OUTPATIENT
Start: 2020-08-17 | End: 2020-08-18

## 2020-08-17 RX ORDER — ALPRAZOLAM 0.5 MG/1
0.5 TABLET ORAL 2 TIMES DAILY PRN
Status: DISCONTINUED | OUTPATIENT
Start: 2020-08-17 | End: 2020-08-18 | Stop reason: HOSPADM

## 2020-08-17 RX ADMIN — TAMSULOSIN HYDROCHLORIDE 0.4 MG: 0.4 CAPSULE ORAL at 17:10

## 2020-08-17 RX ADMIN — IBUPROFEN 600 MG: 600 TABLET, FILM COATED ORAL at 05:56

## 2020-08-17 RX ADMIN — DOCUSATE SODIUM 200 MG: 100 CAPSULE, LIQUID FILLED ORAL at 20:33

## 2020-08-17 RX ADMIN — DOCUSATE SODIUM 50MG AND SENNOSIDES 8.6MG 1 TABLET: 8.6; 5 TABLET, FILM COATED ORAL at 20:34

## 2020-08-17 RX ADMIN — GABAPENTIN 300 MG: 300 CAPSULE ORAL at 08:27

## 2020-08-17 RX ADMIN — GABAPENTIN 300 MG: 300 CAPSULE ORAL at 14:23

## 2020-08-17 RX ADMIN — OXYCODONE HYDROCHLORIDE 10 MG: 5 TABLET ORAL at 07:22

## 2020-08-17 RX ADMIN — IBUPROFEN 600 MG: 600 TABLET, FILM COATED ORAL at 12:01

## 2020-08-17 RX ADMIN — ENOXAPARIN SODIUM 40 MG: 40 INJECTION SUBCUTANEOUS at 14:23

## 2020-08-17 RX ADMIN — ALPRAZOLAM 0.5 MG: 0.5 TABLET ORAL at 20:34

## 2020-08-17 RX ADMIN — KETOROLAC TROMETHAMINE 15 MG: 15 INJECTION, SOLUTION INTRAMUSCULAR; INTRAVENOUS at 00:01

## 2020-08-17 RX ADMIN — MULTIPLE VITAMINS W/ MINERALS TAB 1 TABLET: TAB at 08:27

## 2020-08-17 RX ADMIN — IBUPROFEN 600 MG: 600 TABLET, FILM COATED ORAL at 17:10

## 2020-08-17 RX ADMIN — GABAPENTIN 300 MG: 300 CAPSULE ORAL at 21:00

## 2020-08-17 RX ADMIN — ACETAMINOPHEN 650 MG: 325 TABLET ORAL at 05:56

## 2020-08-17 RX ADMIN — DOCUSATE SODIUM 200 MG: 100 CAPSULE, LIQUID FILLED ORAL at 08:27

## 2020-08-17 RX ADMIN — ACETAMINOPHEN 650 MG: 325 TABLET ORAL at 00:02

## 2020-08-17 RX ADMIN — OXYCODONE HYDROCHLORIDE 10 MG: 5 TABLET ORAL at 14:25

## 2020-08-17 RX ADMIN — ACETAMINOPHEN 650 MG: 325 TABLET ORAL at 12:01

## 2020-08-17 RX ADMIN — LIDOCAINE 1 PATCH: 560 PATCH PERCUTANEOUS; TOPICAL; TRANSDERMAL at 08:26

## 2020-08-17 RX ADMIN — Medication 8.6 MG: at 20:34

## 2020-08-17 RX ADMIN — OXYCODONE HYDROCHLORIDE 10 MG: 5 TABLET ORAL at 20:34

## 2020-08-17 RX ADMIN — ACETAMINOPHEN 650 MG: 325 TABLET ORAL at 17:10
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
## 2020-08-17 NOTE — INTERDISCIPLINARY/THERAPY
attempted to visit patient to follow and for support; patient not available.  Will try again tomorrow.

## 2020-08-17 NOTE — FAX COVER SHEET
Facility Name: [facility]  Mailing address: [address] [place] [address], Zip: [place]   [address]      Attention: UR      From: Lucero Turner RN  Phone Number: [phone]      Comments: ROBERT - [id number]                       Tax ID [id number]    Auth/Cert Number: [id number]    I am covering this case the week of 8.17 to 8.21.2020 Please, see clinical.  Please call, email, or fax days authorized/approved.       Thank you!    Lucero Turner RN  Utilization Review    MAKE A DIFFERENCE, every day.    [address] [place] [address]  p:  [phone]    f: [phone]   e: [email or web address]  Starting January 17th, 2020 [facility]'s name changed to [facility]: Find out more at [email or web address]     This facsimile message is CONFIDENTIAL and may contain -privileged information and/or Protected Health Information (PHI) as defined in the federal Health Insurance Portability and Accountability Act, as amended.  This facsimile is  intended ONLY for the use of the individual or company named.  If the reader is NOT the intended recipient, or the employee or agent responsible to deliver it to the intended recipient, you are hereby notified that any dissemination, distribution, or copying of this communication is prohibited.  If you have received this communication in error, please immediately notify us by telephone so that we may arrange for the return of the original message.

## 2020-08-17 NOTE — PLAN OF CARE
Problem: Knowledge Deficit  Goal: Patient/family/caregiver demonstrates understanding of disease process, treatment plan, medications, and discharge instructions  Description: INTERVENTIONS:   1. Complete learning assessment and assess knowledge base  2. Provide teaching at level of understanding   3. Provide teaching via preferred learning methods  Outcome: Progressing     Problem: Potential for Compromised Skin Integrity  Goal: Skin Integrity is Maintained or Improved  Description: INTERVENTIONS:  1. Assess and monitor skin integrity  2. Collaborate with interdisciplinary team and initiate plans and interventions as needed  3. Alternate a full bath with partial baths for elderly   4. Monitor patient's hygiene practices   5. Collaborate with wound, ostomy, and continence nurse  Outcome: Progressing  Goal: Nutritional status is improving  Description: INTERVENTIONS:  1. Monitor and assess patient for malnutrition (ex- brittle hair, bruises, dry skin, pale skin and conjunctiva, muscle wasting, smooth red tongue, and disorientation)  2. Monitor patient's weight and dietary intake as ordered or per policy  3. Determine patient's food preferences and provide high-protein, high-caloric foods as appropriate  4. Assist patient with eating   5. Allow adequate time for meals   6. Encourage patient to take dietary supplement as ordered   7. Collaborate with dietitian  8. Include patient/family/caregiver in decisions related to nutrition  Outcome: Progressing  Goal: MOBILITY IS MAINTAINED OR IMPROVED  Description: INTERVENTIONS  1. Collaborate with interdisciplinary team and initiate plan and interventions as ordered (PT/OT)  2. Encourage ambulation  3. Up to chair for meals  4. Monitor for signs of deconditioning  Outcome: Progressing     Problem: Neurosensory - Adult  Goal: Achieves stable or improved neurological status  Description: INTERVENTIONS  1. Assess for and report changes in neurological status  2. Initiate  measures to prevent increased intracranial pressure  3. Maintain blood pressure and fluid volume within ordered parameters to optimize cerebral perfusion and minimize risk of hemorrhage  4. Monitor temperature, glucose, and sodium. Initiate appropriate interventions as ordered  Outcome: Progressing  Goal: Absence of seizures  Description: INTERVENTIONS  1. Monitor for seizure activity  2. Administer anti-seizure medications as ordered  3. Monitor neurological status  4. Assist patient in avoiding triggers, if identified  Outcome: Progressing  Goal: Remains free of injury related to seizures activity  Description: INTERVENTIONS  1. Maintain airway, patient safety  and administer oxygen as ordered  2. Monitor patient for seizure activity, document and report duration and description of seizure to provider  3. If seizure occurs, turn patient to side and suction secretions as needed  4. Reorient patient post seizure  5. Seizure pads on all 4 side rails  6. Instruct patient/family to notify RN of any seizure activity  7. Instruct patient/family to call for assistance with activity based on assessment  Outcome: Progressing  Goal: Achieves maximal functionality and self care  Description: INTERVENTIONS  1. Monitor swallowing and airway patency with patient fatigue and changes in neurological status  2. Encourage and assist patient to increase activity and self care with guidance from PT/OT  3. Encourage visually impaired, hearing impaired and aphasic patients to use assistive/communication devices  Outcome: Progressing     Problem: Cardiovascular - Adult  Goal: Maintains optimal cardiac output and hemodynamic stability  Description: INTERVENTIONS:  1. Monitor vital signs and rhythm  2. Monitor for hypotension and other signs of decreased cardiac output  3. Administer and titrate ordered vasoactive medications to optimize hemodynamic stability  4. Monitor for fluid overload/dehydration, weight gain, shortness of breath and  activity intolerance  5. Monitor arterial and/or venous puncture sites for bleeding and/or hematoma  6. Assess quality of pulses, capillary refill, edema, sensation, skin color and temperature  7. Assess for signs of decreased coronary artery perfusion - ex. angina  Outcome: Progressing  Goal: Absence of cardiac dysrhythmias or at baseline  Description: INTERVENTIONS:  1. Continuous cardiac monitoring, monitor vital signs, obtain 12 lead EKG if indicated  2. Administer antiarrhythmic and heart rate control medications as ordered  3. Initiate emergency measures for life threatening arrhythmias  4. Monitor electrolytes and administer replacement therapy as ordered  Outcome: Progressing     Problem: Respiratory - Adult  Goal: Achieves optimal ventilation and oxygenation  Description: INTERVENTIONS:  1. Assess for changes in respiratory status  2. Assess for changes in mentation and behavior  3. Position to facilitate oxygenation and minimize respiratory effort  4. Oxygen supplementation based on oxygen saturation or ABGs  5. Assess patient's ability to cough effectively  6. Encourage broncho-pulmonary hygiene including cough, deep breathe  7. Assess the need for suctioning   8. Assess and instruct to report SOB or any respiratory difficulty  9. Respiratory Therapy support as indicated, including medications and treatment.  Outcome: Progressing     Problem: Gastrointestinal - Adult  Goal: Minimal or absence of nausea and vomiting  Description: INTERVENTIONS:  1. Ensure adequate hydration  2. Monitor intake and output  3. Maintain NPO status until nausea and vomiting are resolved  4. Nasogastric tube to low intermittent suction as ordered  5. Administer ordered antiemetic medications as needed  6. Provide nonpharmacologic comfort measures as appropriate  7. Advance diet as ordered  8. Nutrition consult as indicated   Outcome: Progressing  Goal: Maintains or returns to baseline digestive function  Description:  INTERVENTIONS:  1. Assess bowel function  2. Ensure adequate hydration  3. Administer ordered medications as needed  4. Encourage mobilization and activity  5. Nutrition consult as indicated  6. Assess hydration and nutritional status  7. Assess characteristics and frequency of stool  8. Monitor for metabolic panel imbalances  9. Assess for treatment effectiveness  Outcome: Progressing  Goal: Maintains adequate nutritional intake  Description: INTERVENTIONS:  1. Monitor percentage of each meal consumed  2. Identify factors contributing to decreased intake, treat as appropriate  3. Assist with meals as needed  4. Monitor I&O, weight and lab values  5. Obtain nutritional consult as indicated  6. Administer alternative nutrition interventions as ordered  Outcome: Progressing  Goal: Maintains Optimal Tissue Perfusion  Description: INTERVENTIONS:  1. Monitor for increased abdominal girth, firmness or intra-abdominal pressure  2. Monitor nutritional intake, intake/output, and weight  3. Assess for signs of dehydration  4. Assess blood pressure, heart rate, and oxygen saturation  5. Assess skin color, capillary refill and edema  6. Monitor metabolic panels, blood count panels, and coagulations panels as ordered  7. Assess bowel function  8. Assess for blood in emesis and stool  Outcome: Progressing  Goal: Nutrient intake appropriate for improving, restoring or maintaining nutritional needs  Description: INTERVENTIONS:  1. Assess nutritional status  2. Monitor oral intake, labs, and treatment plans  3. Provide appropriate diets, oral nutritional supplements, and vitamin/mineral supplements  4. Monitor, and adjust tube feedings and TPN/PPN based on assessed needs  5. Provide specific nutrition education as appropriate  Outcome: Progressing     Problem: Genitourinary - Adult  Goal: Absence of urinary retention  Description: INTERVENTIONS:  1. Assess patient’s ability to void and empty bladder.  2. Monitor intake/output and  perform bladder scan if indicated.  3. Place urinary catheter per order and initiate and maintain CAUTI bundle.  4. Administer medications to alleviate retention as needed.  5. Discuss catheterization for long term situations as appropriate.    Outcome: Progressing  Goal: Urinary catheter remains patent  Description: INTERVENTIONS:  1. Assess patency of urinary catheter.  2. Irrigate catheter per order if indicated and notify provider if unable to irrigate.  3. Assess need for a larger catheter size or a 3-way catheter for continuous bladder irrigation.  Outcome: Progressing  Goal: Absence of Urinary Infection  Description: INTERVENTIONS:  1. Assess urinary status for burning, urgency, frequency, and pain  2. Assess urine for color, cloudiness, odor, and amount  3. Monitor intake/output  4. Monitor lab values  5. Obtain urinalysis as ordered  6. Assess for neurological status changes    Outcome: Progressing     Problem: Metabolic/Fluid and Electrolytes - Adult  Goal: Electrolytes maintained within normal limits  Description: INTERVENTIONS:  1. Monitor labs and assess patient for signs and symptoms of electrolyte imbalances  2. Administer electrolyte replacement as ordered  3. Monitor response to electrolyte replacements, including repeat lab results as appropriate  4. Fluid restriction as ordered  5. Instruct patient on fluid and nutrition restrictions as appropriate  Outcome: Progressing  Goal: Maintain Optimal Renal Function and Hemodynamic Stability  Description: INTERVENTIONS:  1. Monitor labs and assess for signs and symptoms of volume excess or deficit  2. Monitor intake, output and patient weight  3. Monitor urine specific gravity, serum osmolarity and serum sodium as indicated or ordered  4. Monitor response to interventions for patient's volume status, including labs, urine output, blood pressure (other measures as available)  5. Encourage oral intake as appropriate  6. Instruct patient on fluid and  nutrition restrictions as appropriate  Outcome: Progressing  Goal: Glucose maintained within prescribed range  Description: INTERVENTIONS:  1. Monitor blood glucose as ordered  2. Assess for signs and symptoms of hyperglycemia and hypoglycemia  3. Administer ordered medications to maintain glucose within target range  4. Assess barriers to adequate nutritional intake and initiate nutrition consult as needed  5. Assess baseline knowledge and provide education as indicated  6. Monitor exercise as may reduce the requirements for insulin  Outcome: Progressing     Problem: Skin/Tissue Integrity - Adult  Goal: Skin integrity remains intact  Description: INTERVENTIONS  1. Assess and document risk factors for pressure ulcer development  2. Assess and document skin integrity  3. Monitor for areas of redness and/or skin breakdown  4. Initiate pressure ulcer prevention measures as indicated  Outcome: Progressing  Goal: Incisions, wounds, or drain sites healing without S/S of infection  Description: INTERVENTIONS  1. Assess and document risk factors for skin breakdown  2. Assess and document skin integrity  3. Assess and document dressing/incision, wound bed, drain sites and surrounding tissue  4. Implement wound care per orders  Outcome: Progressing  Goal: Oral mucous membranes remain intact  Description: INTERVENTIONS  1. Assess oral mucosa and hygiene practices  2. Implement preventative oral hygiene regimen  3. Implement oral medicated treatments as ordered  Outcome: Progressing     Problem: Hematologic - Adult  Goal: Maintains hematologic stability  Description: INTERVENTIONS  1. Assess for signs and symptoms of bleeding or hemorrhage  2. Monitor labs  3. Administer supportive blood products/factors as ordered and appropriate  4. Administer medications as ordered  5. Initiate bleeding precautions as indicated  6. Educate patient/family to report signs/symptoms of bleeding  Outcome: Progressing     Problem: Musculoskeletal  - Adult  Goal: Return mobility to safest level of function  Description: INTERVENTIONS:  1. Assess patient stability and activity tolerance for standing, transferring and ambulating w/ or w/o assistive devices  2. Assist with transfers and ambulation using safe practices  3. Ensure adequate protection for wounds/incisions during mobilization  4. Obtain PT/OT and other consults as needed  5. Apply Continuous Passive Motion per order to increase flexion toward goal  6. Instruct patient/family in ordered activity level  Outcome: Progressing  Goal: Maintain proper alignment of affected body part  Description: INTERVENTIONS:  1. Support and protect limb and body alignment per provider order  2. Instruct and reinforce with patient and family use of appropriate assistive device and precautions (e.g. spinal or hip dislocation precautions)  Outcome: Progressing  Goal: Return ADL status to a safe level of function  Description: INTERVENTIONS:  1. Assess patient's ADL deficits and provide assistive devices as needed  2. Obtain PT/OT consults as needed  3. Assist and instruct patient to increase activity and self care  Outcome: Progressing     Problem: Safety Adult - Fall  Goal: Free from fall injury  Description: INTERVENTIONS:    Inpatient - Please reference Cares/Safety Flowsheet under [place] Fall Risk for interventions.  Pediatrics - Please reference Peds Daily Cares/Safety Flowsheet under [facility] Pediatric Fall Assessment Fall Bundle for interventions  LD/OB - Please reference OB Shift Screening Flowsheet under OB Fall Risk for interventions.  Outcome: Progressing     Problem: Balance  Goal: STG - Maintains static sitting balance without upper extremity support  Description: For 8 mins with SBA in prep for ADL's.   Outcome: Progressing     Problem: Transfers  Goal: STG - Patient will perform toilet transfer  Description: With min of 2.   Outcome: Progressing     Problem: Mobility  Goal: LTG - Patient will ascend and  descend stairs  Description: (3) with one hand rail and stand-by assistance.  Outcome: Progressing  Goal: LTG - Patient will ambulate household distance  Description: With the LRAD and stand-by assistance.  Outcome: Progressing     Problem: TRANSFERS  Goal: STG - Patient will transfer to and from sit to stand  Description: With the LRAD and minimal assistance x1.  Outcome: Progressing  Goal: STG - Patient will perform bed mobility  Description: With minimal assistance x1.  Outcome: Progressing

## 2020-08-17 NOTE — INTERDISCIPLINARY/THERAPY
08/17/20 1415   Subjective Comments   Subjective Comments Pt was not present in her room at this time as she was visiting her  in the ICU. Nursing did not know when she would be back to her room but did report that she is able to get up with only SBA.

## 2020-08-18 VITALS
OXYGEN SATURATION: 93 % | TEMPERATURE: 97.7 F | HEART RATE: 82 BPM | HEIGHT: 66 IN | WEIGHT: 176.15 LBS | BODY MASS INDEX: 28.31 KG/M2 | DIASTOLIC BLOOD PRESSURE: 89 MMHG | SYSTOLIC BLOOD PRESSURE: 140 MMHG | RESPIRATION RATE: 16 BRPM

## 2020-08-18 LAB
ANION GAP SERPL CALC-SCNC: 12 MMOL/L (ref 3–11)
BUN SERPL-MCNC: 11 MG/DL (ref 7–25)
CALCIUM SERPL-MCNC: 9.9 MG/DL (ref 8.6–10.3)
CHLORIDE SERPL-SCNC: 103 MMOL/L (ref 98–107)
CO2 SERPL-SCNC: 24 MMOL/L (ref 21–32)
CREAT SERPL-MCNC: 0.51 MG/DL (ref 0.6–1.1)
ERYTHROCYTE [DISTWIDTH] IN BLOOD BY AUTOMATED COUNT: 13.8 % (ref 11.5–14)
GFR SERPL CREATININE-BSD FRML MDRD: 126 ML/MIN/1.73M*2
GLUCOSE SERPL-MCNC: 97 MG/DL (ref 70–105)
HCT VFR BLD AUTO: 28.7 % (ref 34–45)
HGB BLD-MCNC: 9.9 G/DL (ref 11.5–15.5)
MAGNESIUM SERPL-MCNC: 1.9 MG/DL (ref 1.8–2.4)
MCH RBC QN AUTO: 31.6 PG (ref 28–33)
MCHC RBC AUTO-ENTMCNC: 34.5 G/DL (ref 32–36)
MCV RBC AUTO: 91.6 FL (ref 81–97)
PLATELET # BLD AUTO: 377 10*3/UL (ref 140–350)
PMV BLD AUTO: 7.6 FL (ref 6.9–10.8)
POTASSIUM SERPL-SCNC: 3.7 MMOL/L (ref 3.5–5.1)
RBC # BLD AUTO: 3.13 10*6/ΜL (ref 3.7–5.3)
SODIUM SERPL-SCNC: 139 MMOL/L (ref 135–145)
WBC # BLD AUTO: 8.6 10*3/UL (ref 4.5–10.5)

## 2020-08-18 PROCEDURE — 6370000100 HC RX 637 (ALT 250 FOR IP): Performed by: PHYSICIAN ASSISTANT

## 2020-08-18 PROCEDURE — 6370000100 HC RX 637 (ALT 250 FOR IP): Performed by: NURSE PRACTITIONER

## 2020-08-18 PROCEDURE — 83735 ASSAY OF MAGNESIUM: CPT | Performed by: NURSE PRACTITIONER

## 2020-08-18 PROCEDURE — 80048 BASIC METABOLIC PNL TOTAL CA: CPT | Performed by: NURSE PRACTITIONER

## 2020-08-18 PROCEDURE — 85027 COMPLETE CBC AUTOMATED: CPT | Performed by: NURSE PRACTITIONER

## 2020-08-18 PROCEDURE — 36415 COLL VENOUS BLD VENIPUNCTURE: CPT | Performed by: NURSE PRACTITIONER

## 2020-08-18 PROCEDURE — 2590000100 HC RX 259: Performed by: INTERNAL MEDICINE

## 2020-08-18 PROCEDURE — 2590000100 HC RX 259: Performed by: PHYSICIAN ASSISTANT

## 2020-08-18 PROCEDURE — 99239 HOSP IP/OBS DSCHRG MGMT >30: CPT | Performed by: PHYSICIAN ASSISTANT

## 2020-08-18 PROCEDURE — 2580000300 HC RX 258: Performed by: PHYSICIAN ASSISTANT

## 2020-08-18 RX ORDER — ACETAMINOPHEN 325 MG/1
650 TABLET ORAL EVERY 6 HOURS SCHEDULED
Qty: 80 TABLET | Refills: 0 | Status: SHIPPED | OUTPATIENT
Start: 2020-08-18 | End: 2020-08-28

## 2020-08-18 RX ORDER — OXYCODONE HYDROCHLORIDE 5 MG/1
5-10 TABLET ORAL EVERY 6 HOURS PRN
Qty: 30 TABLET | Refills: 0 | Status: SHIPPED | OUTPATIENT
Start: 2020-08-18 | End: 2020-08-23

## 2020-08-18 RX ORDER — OXYCODONE HYDROCHLORIDE 5 MG/1
5-10 TABLET ORAL EVERY 4 HOURS PRN
Qty: 15 TABLET | Refills: 0 | Status: SHIPPED | OUTPATIENT
Start: 2020-08-18 | End: 2020-08-18

## 2020-08-18 RX ORDER — IBUPROFEN 600 MG/1
600 TABLET ORAL EVERY 6 HOURS SCHEDULED
Qty: 40 TABLET | Refills: 0 | Status: SHIPPED | OUTPATIENT
Start: 2020-08-18 | End: 2020-08-28

## 2020-08-18 RX ORDER — SODIUM CHLORIDE 9 MG/ML
1000 INJECTION, SOLUTION INTRAVENOUS ONCE
Status: COMPLETED | OUTPATIENT
Start: 2020-08-18 | End: 2020-08-18

## 2020-08-18 RX ORDER — CYCLOBENZAPRINE HCL 10 MG
10 TABLET ORAL 3 TIMES DAILY PRN
Qty: 12 TABLET | Refills: 0 | Status: SHIPPED | OUTPATIENT
Start: 2020-08-18 | End: 2020-08-25

## 2020-08-18 RX ORDER — DOCUSATE SODIUM 100 MG/1
200 CAPSULE, LIQUID FILLED ORAL 2 TIMES DAILY
Qty: 40 CAPSULE | Refills: 0 | Status: SHIPPED | OUTPATIENT
Start: 2020-08-18 | End: 2020-08-28

## 2020-08-18 RX ORDER — OXYCODONE HYDROCHLORIDE 5 MG/1
5-10 TABLET ORAL EVERY 4 HOURS PRN
Status: DISCONTINUED | OUTPATIENT
Start: 2020-08-18 | End: 2020-08-18 | Stop reason: HOSPADM

## 2020-08-18 RX ADMIN — DOCUSATE SODIUM 200 MG: 100 CAPSULE, LIQUID FILLED ORAL at 08:34

## 2020-08-18 RX ADMIN — OXYCODONE HYDROCHLORIDE 15 MG: 5 TABLET ORAL at 01:00

## 2020-08-18 RX ADMIN — SODIUM CHLORIDE 1000 ML: 9 INJECTION, SOLUTION INTRAVENOUS at 10:14

## 2020-08-18 RX ADMIN — IBUPROFEN 600 MG: 600 TABLET, FILM COATED ORAL at 11:29

## 2020-08-18 RX ADMIN — OXYCODONE HYDROCHLORIDE 15 MG: 5 TABLET ORAL at 05:18

## 2020-08-18 RX ADMIN — ALPRAZOLAM 0.5 MG: 0.5 TABLET ORAL at 08:35

## 2020-08-18 RX ADMIN — ACETAMINOPHEN 650 MG: 325 TABLET ORAL at 00:00

## 2020-08-18 RX ADMIN — IBUPROFEN 600 MG: 600 TABLET, FILM COATED ORAL at 05:18

## 2020-08-18 RX ADMIN — MULTIPLE VITAMINS W/ MINERALS TAB 1 TABLET: TAB at 08:34

## 2020-08-18 RX ADMIN — ACETAMINOPHEN 650 MG: 325 TABLET ORAL at 05:18

## 2020-08-18 RX ADMIN — LIDOCAINE 1 PATCH: 560 PATCH PERCUTANEOUS; TOPICAL; TRANSDERMAL at 08:34

## 2020-08-18 RX ADMIN — ACETAMINOPHEN 650 MG: 325 TABLET ORAL at 11:29

## 2020-08-18 NOTE — DISCHARGE INSTRUCTIONS
-Change dressing daily as needed. Follow up with orthopedic surgery in [place] for further post-operative care, stitches removal.   -No weightbearing to right or left upper extremity, no lifting greater than 3 pounds with right upper extremity.  Okay to perform activities of daily living with left arm.  -No air travel or scuba diving x 6 weeks.       Opioid Pain Medicine Management  Opioid pain medicines are strong medicines that are used to treat bad or very bad pain. When you take them for a short time, they can help you:  · Sleep better.  · Do better in physical therapy.  · Feel better during the first few days after you get hurt.  · Recover from surgery.  Only take these medicines if a doctor says that you can. You should only take them for a short time. This is because opioids can be hard to stop taking (they are addictive). The longer you take opioids, the harder it may be to stop taking them (opioid use disorder).  What are the risks?  Opioids can cause problems (side effects). Taking them for more than 3 days raises your chance of problems, such as:  · Trouble pooping (constipation).  · Feeling sick to your stomach (nausea).  · Vomiting.  · Feeling very sleepy.  · Confusion.  · Not being able to stop taking the medicine.  · Breathing problems.  Taking opioids for a long time can make it hard for you to do daily tasks. It can also put you at risk for:  · Car accidents.  · Depression.  · Suicide.  · Heart attack.  · Taking too much of the medicine (overdose), which can sometimes lead to death.  What is a pain treatment plan?  A pain treatment plan is a plan made by you and your doctor. Work with your doctor to make a plan for treating your pain. To help you do this:  · Talk about the goals of your treatment, including:  ? How much pain you might expect to have.  ? How you will manage the pain.  · Talk about the risks and benefits of taking these medicines for your condition.  · Remember that a good treatment plan  uses more than one approach and lowers the risks of side effects.  · Tell your doctor about the amount of medicines you take and about any drug or alcohol use.  · Get your pain medicine prescriptions from only one doctor.  Pain can be managed with other treatments. Work with your doctor to find other ways to help your pain, such as:  · Physical therapy.  · Counseling.  · Eating healthy foods.  · Brain exercises.  · Massage.  · Meditation.  · Other pain medicines.  · Doing gentle exercises.  Tapering your use of opioids  If you have been taking opioids for more than a few weeks, you may need to slowly decrease (taper) how much you take until you stop taking them. Doing this can lower your chance of having symptoms, such as:  · Pain and cramping in your belly (abdomen).  · Feeling sick to your stomach.  · Sweating.  · Feeling very sleepy.  · Feeling restless.  · Shaking you cannot control (tremors).  · Cravings for the medicine.  Do not try to stop taking them by yourself. Work with your doctor to stop. Your doctor will help you take less until you are not taking the medicine at all.  Follow these instructions at home:  Safety and storage    · While you are taking opioids:  ? Do not drive.  ? Do not use machines or power tools.  ? Do not sign important papers (legal documents).  ? Do not drink alcohol.  ? Do not take sleeping pills.  ? Do not take care of children by yourself.  ? Do not do activities where you need to climb or be in high places, like working on a ladder.  ? Do not go into any water, such as a lake, river, ocean, swimming pool, or hot tub.  · Keep your opioids locked up or in a place where children cannot reach them.  · Do not share your pain medicine with anyone.  Getting rid of leftover pills  Do not save any leftover pills. Get rid of leftover pills safely by:  · Taking them to a take-back program in your area.  · Bringing them to a pharmacy that has a container for throwing away pills (pill  disposal).  · Throwing them in the trash. Check the label or package insert of your medicine to see whether this is safe to do. If it is safe to throw them out:  1. Take the pills out of their container.  2. Mix the pills with pet poop or food scraps.  3. Put this in the trash.  Activity  · Return to your normal activities as told by your doctor. Ask your doctor what activities are safe for you.  · Avoid doing things that make your pain worse.  · Do exercises as told by your doctor.  General instructions  · You may need to take these actions to prevent or treat trouble pooping:  ? Drink enough fluid to keep your pee (urine) pale yellow.  ? Take over-the-counter or prescription medicines.  ? Eat foods that are high in fiber. These include beans, whole grains, and fresh fruits and vegetables.  ? Limit foods that are high in fat and sugar. These include fried or sweet foods.  · Keep all follow-up visits as told by your doctor. This is important.  Where to find support  If you have been taking opioids for a long time, think about getting help quitting from a local support group or counselor. Ask your doctor about this.  Where to find more information  Centers for Disease Control and Prevention (CDC): www.cdc.gov  Get help right away if:  Seek medical care right away if you are taking opioids and you, or people close to you, notice any of the following:  · You have trouble breathing.  · Your breathing is slower or more shallow than normal.  · You have a very slow heartbeat.  · You feel very confused.  · You pass out (faint).  · You are very sleepy.  · Your speech is not normal.  · You feel sick to your stomach and vomit.  · You have cold skin.  · You have blue lips or fingernails.  · Your muscles are weak (limp) and your body seems floppy.  · The black centers of your eyes (pupils) are smaller than normal.  If you think that you or someone else may have taken too much of an opioid medicine, get medical help right away.  Call your local emergency services (488 in the [place] Do not drive yourself to the hospital.  If you ever feel like you may hurt yourself or others, or have thoughts about taking your own life, get help right away. You can go to your nearest emergency department or call:  · Your local emergency services (396 in the [place]  · The hotline of the National Poison Control Center (1-591.541.3692 in the [place]  · A suicide crisis helpline, such as the National Suicide Prevention Lifeline at 1-144.357.7108. This is open 24 hours a day.  Summary  · Opioid are strong medicines that are used to treat bad or very bad pain.  · A pain treatment plan is a plan made by you and your doctor. Work with your doctor to make a plan for treating your pain.  · Work with your doctor to find other ways to help your pain.  · If you think that you or someone else may have taken too much of an opioid, get help right away.  This information is not intended to replace advice given to you by your health care provider. Make sure you discuss any questions you have with your health care provider.  Document Released: 10/09/2018 Document Revised: 01/17/2020 Document Reviewed: 01/17/2020  Elsevier Patient Education © 2020 [employer]

## 2020-08-18 NOTE — DISCHARGE INSTR - APPOINTMENTS
Appointment 8/18/2020 at 1:00 PM with Dr. Cass NGUYỄN ([facility])  [address] [place] [address], [place] [address]  Please call [phone] from your vehicle when you arrive. Office staff with check you in prior to entering the building.

## 2020-08-18 NOTE — PROGRESS NOTES
Patient Name: Nabila Booth  Admission Date: 8/10/2020   YOB: 1985  Date of Service: 8/18/2020    MRN: [id number]  Site: [place], [place]   Length of Stay: 8 Primary Physician: Pcp No   Code Status: Full Code  Attending Physician: Chhaya Hunt MD         Patient Summary: Nabila Booth is a 34 y.o. female with a PMHx of  admitted on 8/10/2020 for further eval & Tx    Temp:  [36.5 °C (97.7 °F)-36.6 °C (97.9 °F)] 36.5 °C (97.7 °F)  Heart Rate:  [] 82  Resp:  [16-22] 16  BP: (135-155)/(79-97) 140/89  Vitals:    08/17/20 0555 08/17/20 1516 08/17/20 2110 08/18/20 0540   BP: 155/94 155/97 135/79 140/89   BP Location: Left arm Left arm Left arm Left arm   Patient Position: Head of bed 30 degrees or higher Sitting Head of bed 30 degrees or higher Head of bed 30 degrees or higher   Cuff Size: Long Adult Long Adult Long Adult Long Adult   Pulse: 95 105 78 82   Resp: 22 22 16 16   Temp: 36.9 °C (98.4 °F) 36.5 °C (97.7 °F) 36.6 °C (97.9 °F) 36.5 °C (97.7 °F)   TempSrc: Oral Oral Oral Oral   SpO2: 96% 96% 91% 93%   Weight:   79.9 kg (176 lb 2.4 oz)    Height:           Subjective   Resting, comfortably.  No specific complaints.    Objective   Right shoulder wound: Clean dry and intact, no signs of infection.  Gentle range of motion of shoulder elbow wrist and fingers all intact.  Neurovascular intact distally.        Hemoglobin   Date Value Ref Range Status   08/18/2020 9.9 (L) 11.5 - 15.5 g/dL Final               Assessment   Orthopedically stable      Plan    Early range of motion right shoulder, discharge planning.
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
## 2020-08-18 NOTE — NURSING END OF SHIFT
Nursing End of Shift Summary:    Patient: Nabila Booth  MRN: [id number]  : 1985, Age: 34 y.o.    Location: [address] [place] [address]    Nursing Goals  Clinical Goals for the Shift: Pt will remain safe; Monitor vitals, labs, I&Os; Encourage ambulation; Encourage oral intake; Manage pain with oral meds    Narrative Summary of Progress Toward Clinical Goals:  Patient woke up several times throughout shift disoriented to time thinking it was morning due to vision. She continues to be very anxious and tearful.  visited x2 throughout shift. Per patient she would like collaboration with ICU to be able to participate in her 's care rounds in morning.    Barriers to Goals/Nursing Concerns:  Yes - Recovery, psychosocial    New Patient or Family Concerns/Issues:  Yes - recovery, family    Shift Summary:      Significant Events & Communications to Providers (last 12 hours)      Last 5 Values    No documentation.              Oxygen Usage (last 12 hours)      Last 5 Values    No documentation.              Mobility (last 12 hours)      Last 5 Values     Row Name 20 2100                   Mobility    Anti-Embolism Devices  Bilateral;AE calf pump        Anti-Embolism Intervention  On            Urethral Catheter    Active Urethral Catheter     None            Active Lines    Active Central venous catheter / Peripherally inserted central catheter / Implantable Port / Hemodialysis catheter / Midline Catheter     None              Infusing Medications   Medication Dose Last Rate     PRN Medications   Medication Dose Last Dose   • oxyCODONE  5-15 mg 15 mg at 20 0100   • ALPRAZolam  0.5 mg 0.5 mg at 204   • bisacodyL  10 mg     • cyclobenzaprine  10 mg 10 mg at 20 0837   • naloxone  0.2 mg     • magnesium hydroxide  30 mL     • sodium chloride  3 mL     • ondansetron  4 mg 4 mg at 20 1956    Or   • ondansetron  4 mg 4 mg at 08/10/20 2223   • sodium chloride 0.9% (NS)  25-50 mL 25 mL at 20  2052     _________________________  Tiana Serrano RN  08/18/20 4:41 AM
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
## 2020-08-18 NOTE — PLAN OF CARE
Problem: Knowledge Deficit  Goal: Patient/family/caregiver demonstrates understanding of disease process, treatment plan, medications, and discharge instructions  Description: INTERVENTIONS:   1. Complete learning assessment and assess knowledge base  2. Provide teaching at level of understanding   3. Provide teaching via preferred learning methods  Outcome: Adequate for Discharge     Problem: Potential for Compromised Skin Integrity  Goal: Skin Integrity is Maintained or Improved  Description: INTERVENTIONS:  1. Assess and monitor skin integrity  2. Collaborate with interdisciplinary team and initiate plans and interventions as needed  3. Alternate a full bath with partial baths for elderly   4. Monitor patient's hygiene practices   5. Collaborate with wound, ostomy, and continence nurse  Outcome: Adequate for Discharge  Goal: Nutritional status is improving  Description: INTERVENTIONS:  1. Monitor and assess patient for malnutrition (ex- brittle hair, bruises, dry skin, pale skin and conjunctiva, muscle wasting, smooth red tongue, and disorientation)  2. Monitor patient's weight and dietary intake as ordered or per policy  3. Determine patient's food preferences and provide high-protein, high-caloric foods as appropriate  4. Assist patient with eating   5. Allow adequate time for meals   6. Encourage patient to take dietary supplement as ordered   7. Collaborate with dietitian  8. Include patient/family/caregiver in decisions related to nutrition  Outcome: Adequate for Discharge  Goal: MOBILITY IS MAINTAINED OR IMPROVED  Description: INTERVENTIONS  1. Collaborate with interdisciplinary team and initiate plan and interventions as ordered (PT/OT)  2. Encourage ambulation  3. Up to chair for meals  4. Monitor for signs of deconditioning  Outcome: Adequate for Discharge     Problem: Neurosensory - Adult  Goal: Achieves stable or improved neurological status  Description: INTERVENTIONS  1. Assess for and report  changes in neurological status  2. Initiate measures to prevent increased intracranial pressure  3. Maintain blood pressure and fluid volume within ordered parameters to optimize cerebral perfusion and minimize risk of hemorrhage  4. Monitor temperature, glucose, and sodium. Initiate appropriate interventions as ordered  Outcome: Adequate for Discharge  Goal: Absence of seizures  Description: INTERVENTIONS  1. Monitor for seizure activity  2. Administer anti-seizure medications as ordered  3. Monitor neurological status  4. Assist patient in avoiding triggers, if identified  Outcome: Adequate for Discharge  Goal: Remains free of injury related to seizures activity  Description: INTERVENTIONS  1. Maintain airway, patient safety  and administer oxygen as ordered  2. Monitor patient for seizure activity, document and report duration and description of seizure to provider  3. If seizure occurs, turn patient to side and suction secretions as needed  4. Reorient patient post seizure  5. Seizure pads on all 4 side rails  6. Instruct patient/family to notify RN of any seizure activity  7. Instruct patient/family to call for assistance with activity based on assessment  Outcome: Adequate for Discharge  Goal: Achieves maximal functionality and self care  Description: INTERVENTIONS  1. Monitor swallowing and airway patency with patient fatigue and changes in neurological status  2. Encourage and assist patient to increase activity and self care with guidance from PT/OT  3. Encourage visually impaired, hearing impaired and aphasic patients to use assistive/communication devices  Outcome: Adequate for Discharge     Problem: Cardiovascular - Adult  Goal: Maintains optimal cardiac output and hemodynamic stability  Description: INTERVENTIONS:  1. Monitor vital signs and rhythm  2. Monitor for hypotension and other signs of decreased cardiac output  3. Administer and titrate ordered vasoactive medications to optimize hemodynamic  stability  4. Monitor for fluid overload/dehydration, weight gain, shortness of breath and activity intolerance  5. Monitor arterial and/or venous puncture sites for bleeding and/or hematoma  6. Assess quality of pulses, capillary refill, edema, sensation, skin color and temperature  7. Assess for signs of decreased coronary artery perfusion - ex. angina  Outcome: Adequate for Discharge  Goal: Absence of cardiac dysrhythmias or at baseline  Description: INTERVENTIONS:  1. Continuous cardiac monitoring, monitor vital signs, obtain 12 lead EKG if indicated  2. Administer antiarrhythmic and heart rate control medications as ordered  3. Initiate emergency measures for life threatening arrhythmias  4. Monitor electrolytes and administer replacement therapy as ordered  Outcome: Adequate for Discharge     Problem: Respiratory - Adult  Goal: Achieves optimal ventilation and oxygenation  Description: INTERVENTIONS:  1. Assess for changes in respiratory status  2. Assess for changes in mentation and behavior  3. Position to facilitate oxygenation and minimize respiratory effort  4. Oxygen supplementation based on oxygen saturation or ABGs  5. Assess patient's ability to cough effectively  6. Encourage broncho-pulmonary hygiene including cough, deep breathe  7. Assess the need for suctioning   8. Assess and instruct to report SOB or any respiratory difficulty  9. Respiratory Therapy support as indicated, including medications and treatment.  Outcome: Adequate for Discharge     Problem: Gastrointestinal - Adult  Goal: Minimal or absence of nausea and vomiting  Description: INTERVENTIONS:  1. Ensure adequate hydration  2. Monitor intake and output  3. Maintain NPO status until nausea and vomiting are resolved  4. Nasogastric tube to low intermittent suction as ordered  5. Administer ordered antiemetic medications as needed  6. Provide nonpharmacologic comfort measures as appropriate  7. Advance diet as ordered  8. Nutrition  consult as indicated   Outcome: Adequate for Discharge  Goal: Maintains or returns to baseline digestive function  Description: INTERVENTIONS:  1. Assess bowel function  2. Ensure adequate hydration  3. Administer ordered medications as needed  4. Encourage mobilization and activity  5. Nutrition consult as indicated  6. Assess hydration and nutritional status  7. Assess characteristics and frequency of stool  8. Monitor for metabolic panel imbalances  9. Assess for treatment effectiveness  Outcome: Adequate for Discharge  Goal: Maintains adequate nutritional intake  Description: INTERVENTIONS:  1. Monitor percentage of each meal consumed  2. Identify factors contributing to decreased intake, treat as appropriate  3. Assist with meals as needed  4. Monitor I&O, weight and lab values  5. Obtain nutritional consult as indicated  6. Administer alternative nutrition interventions as ordered  Outcome: Adequate for Discharge  Goal: Maintains Optimal Tissue Perfusion  Description: INTERVENTIONS:  1. Monitor for increased abdominal girth, firmness or intra-abdominal pressure  2. Monitor nutritional intake, intake/output, and weight  3. Assess for signs of dehydration  4. Assess blood pressure, heart rate, and oxygen saturation  5. Assess skin color, capillary refill and edema  6. Monitor metabolic panels, blood count panels, and coagulations panels as ordered  7. Assess bowel function  8. Assess for blood in emesis and stool  Outcome: Adequate for Discharge  Goal: Nutrient intake appropriate for improving, restoring or maintaining nutritional needs  Description: INTERVENTIONS:  1. Assess nutritional status  2. Monitor oral intake, labs, and treatment plans  3. Provide appropriate diets, oral nutritional supplements, and vitamin/mineral supplements  4. Monitor, and adjust tube feedings and TPN/PPN based on assessed needs  5. Provide specific nutrition education as appropriate  Outcome: Adequate for Discharge     Problem:  Genitourinary - Adult  Goal: Absence of urinary retention  Description: INTERVENTIONS:  1. Assess patient’s ability to void and empty bladder.  2. Monitor intake/output and perform bladder scan if indicated.  3. Place urinary catheter per order and initiate and maintain CAUTI bundle.  4. Administer medications to alleviate retention as needed.  5. Discuss catheterization for long term situations as appropriate.    Outcome: Adequate for Discharge  Goal: Urinary catheter remains patent  Description: INTERVENTIONS:  1. Assess patency of urinary catheter.  2. Irrigate catheter per order if indicated and notify provider if unable to irrigate.  3. Assess need for a larger catheter size or a 3-way catheter for continuous bladder irrigation.  Outcome: Adequate for Discharge  Goal: Absence of Urinary Infection  Description: INTERVENTIONS:  1. Assess urinary status for burning, urgency, frequency, and pain  2. Assess urine for color, cloudiness, odor, and amount  3. Monitor intake/output  4. Monitor lab values  5. Obtain urinalysis as ordered  6. Assess for neurological status changes    Outcome: Adequate for Discharge     Problem: Metabolic/Fluid and Electrolytes - Adult  Goal: Electrolytes maintained within normal limits  Description: INTERVENTIONS:  1. Monitor labs and assess patient for signs and symptoms of electrolyte imbalances  2. Administer electrolyte replacement as ordered  3. Monitor response to electrolyte replacements, including repeat lab results as appropriate  4. Fluid restriction as ordered  5. Instruct patient on fluid and nutrition restrictions as appropriate  Outcome: Adequate for Discharge  Goal: Maintain Optimal Renal Function and Hemodynamic Stability  Description: INTERVENTIONS:  1. Monitor labs and assess for signs and symptoms of volume excess or deficit  2. Monitor intake, output and patient weight  3. Monitor urine specific gravity, serum osmolarity and serum sodium as indicated or ordered  4.  Monitor response to interventions for patient's volume status, including labs, urine output, blood pressure (other measures as available)  5. Encourage oral intake as appropriate  6. Instruct patient on fluid and nutrition restrictions as appropriate  Outcome: Adequate for Discharge  Goal: Glucose maintained within prescribed range  Description: INTERVENTIONS:  1. Monitor blood glucose as ordered  2. Assess for signs and symptoms of hyperglycemia and hypoglycemia  3. Administer ordered medications to maintain glucose within target range  4. Assess barriers to adequate nutritional intake and initiate nutrition consult as needed  5. Assess baseline knowledge and provide education as indicated  6. Monitor exercise as may reduce the requirements for insulin  Outcome: Adequate for Discharge     Problem: Skin/Tissue Integrity - Adult  Goal: Skin integrity remains intact  Description: INTERVENTIONS  1. Assess and document risk factors for pressure ulcer development  2. Assess and document skin integrity  3. Monitor for areas of redness and/or skin breakdown  4. Initiate pressure ulcer prevention measures as indicated  Outcome: Adequate for Discharge  Goal: Incisions, wounds, or drain sites healing without S/S of infection  Description: INTERVENTIONS  1. Assess and document risk factors for skin breakdown  2. Assess and document skin integrity  3. Assess and document dressing/incision, wound bed, drain sites and surrounding tissue  4. Implement wound care per orders  Outcome: Adequate for Discharge  Goal: Oral mucous membranes remain intact  Description: INTERVENTIONS  1. Assess oral mucosa and hygiene practices  2. Implement preventative oral hygiene regimen  3. Implement oral medicated treatments as ordered  Outcome: Adequate for Discharge     Problem: Hematologic - Adult  Goal: Maintains hematologic stability  Description: INTERVENTIONS  1. Assess for signs and symptoms of bleeding or hemorrhage  2. Monitor labs  3.  Administer supportive blood products/factors as ordered and appropriate  4. Administer medications as ordered  5. Initiate bleeding precautions as indicated  6. Educate patient/family to report signs/symptoms of bleeding  Outcome: Adequate for Discharge     Problem: Musculoskeletal - Adult  Goal: Return mobility to safest level of function  Description: INTERVENTIONS:  1. Assess patient stability and activity tolerance for standing, transferring and ambulating w/ or w/o assistive devices  2. Assist with transfers and ambulation using safe practices  3. Ensure adequate protection for wounds/incisions during mobilization  4. Obtain PT/OT and other consults as needed  5. Apply Continuous Passive Motion per order to increase flexion toward goal  6. Instruct patient/family in ordered activity level  Outcome: Adequate for Discharge  Goal: Maintain proper alignment of affected body part  Description: INTERVENTIONS:  1. Support and protect limb and body alignment per provider order  2. Instruct and reinforce with patient and family use of appropriate assistive device and precautions (e.g. spinal or hip dislocation precautions)  Outcome: Adequate for Discharge  Goal: Return ADL status to a safe level of function  Description: INTERVENTIONS:  1. Assess patient's ADL deficits and provide assistive devices as needed  2. Obtain PT/OT consults as needed  3. Assist and instruct patient to increase activity and self care  Outcome: Adequate for Discharge     Problem: Safety Adult - Fall  Goal: Free from fall injury  Description: INTERVENTIONS:    Inpatient - Please reference Cares/Safety Flowsheet under [place] Fall Risk for interventions.  Pediatrics - Please reference Peds Daily Cares/Safety Flowsheet under [place] Pediatric Fall Assessment Fall Bundle for interventions  LD/OB - Please reference OB Shift Screening Flowsheet under OB Fall Risk for interventions.  Outcome: Adequate for Discharge     Problem: Balance  Goal: STG -  Maintains static sitting balance without upper extremity support  Description: For 8 mins with SBA in prep for ADL's.   Outcome: Adequate for Discharge     Problem: Transfers  Goal: STG - Patient will perform toilet transfer  Description: With min of 2.   Outcome: Adequate for Discharge     Problem: Mobility  Goal: LTG - Patient will ascend and descend stairs  Description: (3) with one hand rail and stand-by assistance.  Outcome: Adequate for Discharge  Goal: LTG - Patient will ambulate household distance  Description: With the LRAD and stand-by assistance.  Outcome: Adequate for Discharge     Problem: TRANSFERS  Goal: STG - Patient will transfer to and from sit to stand  Description: With the LRAD and minimal assistance x1.  Outcome: Adequate for Discharge  Goal: STG - Patient will perform bed mobility  Description: With minimal assistance x1.  Outcome: Adequate for Discharge

## 2020-08-24 ENCOUNTER — OFFICE VISIT (OUTPATIENT)
Dept: FAMILY MEDICINE | Facility: CLINIC | Age: 35
End: 2020-08-24
Payer: COMMERCIAL

## 2020-08-24 VITALS
SYSTOLIC BLOOD PRESSURE: 134 MMHG | HEIGHT: 61 IN | DIASTOLIC BLOOD PRESSURE: 86 MMHG | HEART RATE: 84 BPM | OXYGEN SATURATION: 98 % | TEMPERATURE: 98.3 F | RESPIRATION RATE: 14 BRPM | BODY MASS INDEX: 30.93 KG/M2 | WEIGHT: 163.8 LBS

## 2020-08-24 DIAGNOSIS — S42.101D CLOSED FRACTURE OF RIGHT SCAPULA WITH ROUTINE HEALING, UNSPECIFIED PART OF SCAPULA, SUBSEQUENT ENCOUNTER: ICD-10-CM

## 2020-08-24 DIAGNOSIS — Z98.890 S/P ORIF (OPEN REDUCTION INTERNAL FIXATION) FRACTURE: ICD-10-CM

## 2020-08-24 DIAGNOSIS — T14.8XXA SKIN ABRASION: ICD-10-CM

## 2020-08-24 DIAGNOSIS — S22.43XD MULTIPLE CLOSED FRACTURES OF RIBS OF BOTH SIDES WITH ROUTINE HEALING, SUBSEQUENT ENCOUNTER: ICD-10-CM
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
## 2020-08-24 DIAGNOSIS — H53.8 BLURRED VISION: ICD-10-CM

## 2020-08-24 DIAGNOSIS — Z87.81 S/P ORIF (OPEN REDUCTION INTERNAL FIXATION) FRACTURE: ICD-10-CM

## 2020-08-24 PROCEDURE — 99204 OFFICE O/P NEW MOD 45 MIN: CPT | Performed by: FAMILY MEDICINE

## 2020-08-24 RX ORDER — DEXTROAMPHETAMINE SACCHARATE, AMPHETAMINE ASPARTATE, DEXTROAMPHETAMINE SULFATE AND AMPHETAMINE SULFATE 2.5; 2.5; 2.5; 2.5 MG/1; MG/1; MG/1; MG/1
10 TABLET ORAL
COMMUNITY
Start: 2020-07-30

## 2020-08-24 RX ORDER — ACETAMINOPHEN 325 MG/1
650 TABLET ORAL
COMMUNITY
Start: 2020-08-18 | End: 2020-08-29

## 2020-08-24 RX ORDER — CITALOPRAM HYDROBROMIDE 40 MG/1
40 TABLET ORAL
COMMUNITY
Start: 2019-11-06

## 2020-08-24 RX ORDER — DEXTROAMPHETAMINE SACCHARATE, AMPHETAMINE ASPARTATE MONOHYDRATE, DEXTROAMPHETAMINE SULFATE AND AMPHETAMINE SULFATE 5; 5; 5; 5 MG/1; MG/1; MG/1; MG/1
20 CAPSULE, EXTENDED RELEASE ORAL
COMMUNITY
Start: 2020-07-30

## 2020-08-24 ASSESSMENT — MIFFLIN-ST. JEOR: SCORE: 1385.37
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
## 2020-08-24 NOTE — PATIENT INSTRUCTIONS
You will be contacted in 1 business day to get a schedule for the orthopedic specialist.    .Get MRI done as ordered by the eye specialist.    Acetaminophen 500 mg orally 1-2 tablets every 4-6 hrs as needed for pain  Ibuprofen 200 mg 1-2 tablets with food every 8 hrs as needed for pain  Patient Education     Rib Fracture (Broken Rib)     A chest x-ray may be done.   Your ribs are curved bones in your chest. They help protect your lungs and expand and contract when you breathe. Children's ribs bend easily and can often withstand a blow or fall. But adult ribs are more likely to break (fracture) under stress. Even coughing or a hard sneeze can fracture a rib.  When to go to the Emergency Room (ER)  Although they can be painful, most rib fractures aren't serious. But they often make it hard to cough or breathe deeply. Get medical care right away if you have:    Trouble breathing.    Nausea, vomiting, or stomach pain with a sore or bruised rib.    Pain that worsens over time.    An injury to the chest or stomach.  What to expect in the ER  Here is what will happen in the ER:     A healthcare provider will ask about your injury and examine you carefully.    An X-ray of your chest will likely be taken to show any major damage to ribs and lungs. But ribs can have small breaks that don't show up on X-rays, even though they still hurt.    You may be given medicine to ease your discomfort.    In rare cases, rib fractures can cause a lung to collapse or lead to bleeding in the chest. In these cases, a tube will be inserted into the chest to reinflate the lung or drain the blood.  Follow-up  You are likely to heal in 6 to 8 weeks. Most rib fractures heal on their own with no lasting effects. Call your healthcare provider right away if you notice any of these symptoms:    Increased chest pain    Shortness of breath    Fever or chills    Coughing up blood  Date Last Reviewed: 4/1/2018 2000-2019 [employer] [address]  "[place] [address]. All rights reserved. This information is not intended as a substitute for professional medical care. Always follow your healthcare professional's instructions.           Patient Education     Shoulder Blade or Collarbone Fracture  You have one long collarbone (clavicle) on top of each shoulder. The collarbone is attached to your shoulder blade (scapula). These bones hold your arms in place and help them move. Collarbone breaks (fractures) are very common, and are often due to a blow or fall. Shoulder blade fractures are much less common. Without treatment, either injury can lead to chronic shoulder problems.    Risk factors  The collarbones don't harden fully until about age 20. As a result, children and teens can easily fracture these bones. A baby's collarbone may fracture during birth. Shoulder blades rarely break. When they do, it's often the result of violent trauma, such as a car crash.  When to go to the Emergency Room (ER)  Get help right away if you suspect a fractured collarbone or shoulder blade. The symptoms of a fractured collarbone or shoulder blade include:    Extreme pain when you move your arm    A shoulder that sags down and forward    A bump over the fracture site    A grinding feeling and extreme pain when you try to lift your arm    Bone sticking out (protruding) from skin, or \"tenting\" of skin at the fracture site  What to expect in the ER  Here is what will happen in the ER:     You will be examined.    X-rays will be taken of your chest and shoulder.    You may be given medicine to lessen your pain.    A (CT) scan may be done. This test provides detailed images of your bones.  Treatment  Both types of fractures are often treated with an arm sling. This holds the bone in place and keeps it from moving during healing. You also may be instructed to do special exercises to help improve strength and range of motion in your shoulder. In some cases, surgery is " recommended to realign the fracture. Or surgery may be done to hold it in place so that it can heal correctly.  Date Last Reviewed: 4/1/2018 2000-2019 [employer]. [address] [place] [address], [place] [address]. All rights reserved. This information is not intended as a substitute for professional medical care. Always follow your healthcare professional's instructions.           Patient Education     Abrasions  Abrasions are skin scrapes. Their treatment depends on how large and deep the abrasion is.  Home care  You may be prescribed an antibiotic cream or ointment to apply to the wound. This helps prevent infection. Follow instructions when using this medicine.  General care    To care for the abrasion, do the following each day for as long as directed by your healthcare provider.  ? If you were given a bandage, change it once a day. If your bandage sticks to the wound, soak it in warm water until it loosens.  ? Wash the area with soap and warm water. You may do this in a sink or under a tub faucet or shower. Rinse off the soap. Then pat the area dry with a clean towel.  ? If antibiotic ointment or cream was prescribed, reapply it to the wound as directed. Cover the wound with a fresh nonstick bandage. If the bandage becomes wet or dirty, change it as soon as possible.  ? Some antibiotic ointments or cream can cause an allergic reaction or dermatitis. This may cause redness, itching and or hives. If this occurs, stop using the ointment immediately and wash off any remaining ointment. You may need to take some allergy medicine to relieve symptoms.    You may use acetaminophen or ibuprofen to control pain unless another pain medicine was prescribed. Talk with your healthcare provider before using these medicines if you have chronic liver or kidney disease or ever had a stomach ulcer or GI bleeding. Don t use ibuprofen in children younger than six months old.    Most skin wounds heal within 10 days. But an  infection may occur even with treatment. So it s important to watch the wound for signs of infection as listed below.  Follow-up care  Follow up with your healthcare provider, or as advised.  When to seek medical advice  Call your healthcare provider right away if any of these occur:    Fever of 100.4 F (38 C) or higher, or as directed by your healthcare provider    Increasing pain, redness, swelling, or drainage from the wound    Bleeding from the wound that does not stop after a few minutes of steady, firm pressure    Decreased ability to move any body part near the wound  Date Last Reviewed: 3/3/2017    5227-9881 [employer]. [address] [place] [address], [place] [address]. All rights reserved. This information is not intended as a substitute for professional medical care. Always follow your healthcare professional's instructions.

## 2020-08-27 ENCOUNTER — OFFICE VISIT (OUTPATIENT)
Dept: ORTHOPEDICS | Facility: CLINIC | Age: 35
End: 2020-08-27
Payer: COMMERCIAL

## 2020-08-27 ENCOUNTER — ANCILLARY PROCEDURE (OUTPATIENT)
Dept: GENERAL RADIOLOGY | Facility: CLINIC | Age: 35
End: 2020-08-27
Attending: ORTHOPAEDIC SURGERY

## 2020-08-27 ENCOUNTER — TELEPHONE (OUTPATIENT)
Dept: NEUROLOGY | Facility: CLINIC | Age: 35
End: 2020-08-27

## 2020-08-27 VITALS
HEART RATE: 114 BPM | WEIGHT: 163 LBS | DIASTOLIC BLOOD PRESSURE: 86 MMHG | SYSTOLIC BLOOD PRESSURE: 131 MMHG | HEIGHT: 61 IN | BODY MASS INDEX: 30.78 KG/M2

## 2020-08-27 DIAGNOSIS — Z87.81 S/P ORIF (OPEN REDUCTION INTERNAL FIXATION) FRACTURE: ICD-10-CM

## 2020-08-27 DIAGNOSIS — Z98.890 S/P ORIF (OPEN REDUCTION INTERNAL FIXATION) FRACTURE: ICD-10-CM

## 2020-08-27 DIAGNOSIS — L03.312 CELLULITIS OF BACK EXCEPT BUTTOCK: Primary | ICD-10-CM

## 2020-08-27 DIAGNOSIS — S42.111A CLOSED DISPLACED FRACTURE OF BODY OF RIGHT SCAPULA, INITIAL ENCOUNTER: ICD-10-CM

## 2020-08-27 DIAGNOSIS — S42.101D CLOSED FRACTURE OF RIGHT SCAPULA WITH ROUTINE HEALING, UNSPECIFIED PART OF SCAPULA, SUBSEQUENT ENCOUNTER: ICD-10-CM

## 2020-08-27 PROCEDURE — 73030 X-RAY EXAM OF SHOULDER: CPT | Mod: RT

## 2020-08-27 PROCEDURE — 99243 OFF/OP CNSLTJ NEW/EST LOW 30: CPT | Performed by: ORTHOPAEDIC SURGERY

## 2020-08-27 RX ORDER — IBUPROFEN 600 MG/1
600 TABLET, FILM COATED ORAL EVERY 6 HOURS PRN
Qty: 60 TABLET | Refills: 1 | Status: SHIPPED | OUTPATIENT
Start: 2020-08-27

## 2020-08-27 RX ORDER — CEPHALEXIN 500 MG/1
500 CAPSULE ORAL 4 TIMES DAILY
Qty: 40 CAPSULE | Refills: 0 | Status: SHIPPED | OUTPATIENT
Start: 2020-08-27 | End: 2020-09-06

## 2020-08-27 ASSESSMENT — MIFFLIN-ST. JEOR: SCORE: 1381.74

## 2020-08-27 NOTE — PROGRESS NOTES
Send clinic notes when finished to [facility] Attn. Lea Tsang off an [facility].
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
## 2020-08-27 NOTE — TELEPHONE ENCOUNTER
I spoke with both Tatiana and her MIL Nancy.  I explained that Dr. Garcia' next availability would be 9/15/20.  I also offered an appointment with her Neurology colleague in [place]-- offering her an appointment for 9/1/20.  Rossana declined, saying she can't wait that long.  She advised she's heading back to [facility] this weekend. Nancy asked that I touch bases with Tatiana's primary at [place], asking them to reach out to [facility] about possibly being evaluated there next week.      I see Ananya Tsang PA-C ([facility]'s primary) had received information about Tatiana's condition from Dr. Jayson Esparza on 8/24/20, so there's some context.  In light of that, I've left a message for Ananya Tsang, asking her office to reach out to [facility] for possible evaluation early next week.  I left both Tatiana's contact number, and Nancy's.    Tatiana [phone]  Nancy [phone]
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
## 2022-01-27 ENCOUNTER — HOSPITAL ENCOUNTER (EMERGENCY)
Facility: OTHER | Age: 37
Discharge: HOME | End: 2022-01-27
Attending: FAMILY MEDICINE | Admitting: FAMILY MEDICINE
Payer: COMMERCIAL

## 2022-01-27 VITALS
DIASTOLIC BLOOD PRESSURE: 109 MMHG | RESPIRATION RATE: 22 BRPM | OXYGEN SATURATION: 97 % | TEMPERATURE: 98.1 F | SYSTOLIC BLOOD PRESSURE: 147 MMHG | HEART RATE: 93 BPM

## 2022-01-27 DIAGNOSIS — J01.00 ACUTE NON-RECURRENT MAXILLARY SINUSITIS: ICD-10-CM

## 2022-01-27 DIAGNOSIS — U07.1 INFECTION DUE TO 2019 NOVEL CORONAVIRUS: ICD-10-CM

## 2022-01-27 LAB
ANION GAP SERPL CALCULATED.3IONS-SCNC: 9 MMOL/L (ref 3–14)
BASOPHILS # BLD AUTO: 0 10E3/UL (ref 0–0.2)
BASOPHILS NFR BLD AUTO: 0 %
BUN SERPL-MCNC: 7 MG/DL (ref 7–25)
CALCIUM SERPL-MCNC: 10.3 MG/DL (ref 8.6–10.3)
CHLORIDE BLD-SCNC: 102 MMOL/L (ref 98–107)
CO2 SERPL-SCNC: 24 MMOL/L (ref 21–31)
CREAT SERPL-MCNC: 0.66 MG/DL (ref 0.6–1.2)
EOSINOPHIL # BLD AUTO: 0.2 10E3/UL (ref 0–0.7)
EOSINOPHIL NFR BLD AUTO: 2 %
ERYTHROCYTE [DISTWIDTH] IN BLOOD BY AUTOMATED COUNT: 13.1 % (ref 10–15)
FLUAV RNA SPEC QL NAA+PROBE: NEGATIVE
FLUBV RNA RESP QL NAA+PROBE: NEGATIVE
GFR SERPL CREATININE-BSD FRML MDRD: >90 ML/MIN/1.73M2
GLUCOSE BLD-MCNC: 89 MG/DL (ref 70–105)
GROUP A STREP BY PCR: NOT DETECTED
HCT VFR BLD AUTO: 35.9 % (ref 35–47)
HGB BLD-MCNC: 12.1 G/DL (ref 11.7–15.7)
IMM GRANULOCYTES # BLD: 0.1 10E3/UL
IMM GRANULOCYTES NFR BLD: 1 %
LYMPHOCYTES # BLD AUTO: 1.3 10E3/UL (ref 0.8–5.3)
LYMPHOCYTES NFR BLD AUTO: 14 %
MCH RBC QN AUTO: 28.9 PG (ref 26.5–33)
MCHC RBC AUTO-ENTMCNC: 33.7 G/DL (ref 31.5–36.5)
MCV RBC AUTO: 86 FL (ref 78–100)
MONOCYTES # BLD AUTO: 0.8 10E3/UL (ref 0–1.3)
MONOCYTES NFR BLD AUTO: 9 %
NEUTROPHILS # BLD AUTO: 6.9 10E3/UL (ref 1.6–8.3)
NEUTROPHILS NFR BLD AUTO: 74 %
NRBC # BLD AUTO: 0 10E3/UL
NRBC BLD AUTO-RTO: 0 /100
PLATELET # BLD AUTO: 345 10E3/UL (ref 150–450)
POTASSIUM BLD-SCNC: 3.9 MMOL/L (ref 3.5–5.1)
RBC # BLD AUTO: 4.19 10E6/UL (ref 3.8–5.2)
RSV RNA SPEC NAA+PROBE: NEGATIVE
SARS-COV-2 RNA RESP QL NAA+PROBE: POSITIVE
SODIUM SERPL-SCNC: 135 MMOL/L (ref 134–144)
WBC # BLD AUTO: 9.2 10E3/UL (ref 4–11)

## 2022-01-27 PROCEDURE — 250N000011 HC RX IP 250 OP 636: Performed by: FAMILY MEDICINE

## 2022-01-27 PROCEDURE — 36415 COLL VENOUS BLD VENIPUNCTURE: CPT | Performed by: FAMILY MEDICINE

## 2022-01-27 PROCEDURE — 99283 EMERGENCY DEPT VISIT LOW MDM: CPT | Performed by: FAMILY MEDICINE

## 2022-01-27 PROCEDURE — 87651 STREP A DNA AMP PROBE: CPT | Performed by: FAMILY MEDICINE

## 2022-01-27 PROCEDURE — 85025 COMPLETE CBC W/AUTO DIFF WBC: CPT | Performed by: FAMILY MEDICINE

## 2022-01-27 PROCEDURE — 258N000003 HC RX IP 258 OP 636: Performed by: FAMILY MEDICINE

## 2022-01-27 PROCEDURE — 82310 ASSAY OF CALCIUM: CPT | Performed by: FAMILY MEDICINE

## 2022-01-27 PROCEDURE — 250N000009 HC RX 250: Performed by: FAMILY MEDICINE

## 2022-01-27 PROCEDURE — 87637 SARSCOV2&INF A&B&RSV AMP PRB: CPT | Performed by: FAMILY MEDICINE

## 2022-01-27 PROCEDURE — 250N000013 HC RX MED GY IP 250 OP 250 PS 637: Performed by: FAMILY MEDICINE

## 2022-01-27 PROCEDURE — 99284 EMERGENCY DEPT VISIT MOD MDM: CPT | Mod: 25 | Performed by: FAMILY MEDICINE

## 2022-01-27 PROCEDURE — 96374 THER/PROPH/DIAG INJ IV PUSH: CPT | Performed by: FAMILY MEDICINE

## 2022-01-27 PROCEDURE — C9803 HOPD COVID-19 SPEC COLLECT: HCPCS | Performed by: FAMILY MEDICINE

## 2022-01-27 RX ORDER — ONDANSETRON 4 MG/1
4 TABLET, ORALLY DISINTEGRATING ORAL ONCE
Status: COMPLETED | OUTPATIENT
Start: 2022-01-27 | End: 2022-01-27

## 2022-01-27 RX ORDER — SODIUM CHLORIDE 9 MG/ML
INJECTION, SOLUTION INTRAVENOUS CONTINUOUS
Status: DISCONTINUED | OUTPATIENT
Start: 2022-01-27 | End: 2022-01-27 | Stop reason: HOSPADM

## 2022-01-27 RX ORDER — OXYCODONE AND ACETAMINOPHEN 5; 325 MG/1; MG/1
1 TABLET ORAL ONCE
Status: COMPLETED | OUTPATIENT
Start: 2022-01-27 | End: 2022-01-27

## 2022-01-27 RX ORDER — AMOXICILLIN 500 MG/1
500 CAPSULE ORAL 3 TIMES DAILY
Qty: 21 CAPSULE | Refills: 0 | Status: SHIPPED | OUTPATIENT
Start: 2022-01-27

## 2022-01-27 RX ORDER — DEXAMETHASONE SODIUM PHOSPHATE 4 MG/ML
6 VIAL (ML) INJECTION ONCE
Status: COMPLETED | OUTPATIENT
Start: 2022-01-27 | End: 2022-01-27

## 2022-01-27 RX ORDER — KETOROLAC TROMETHAMINE 15 MG/ML
15 INJECTION, SOLUTION INTRAMUSCULAR; INTRAVENOUS ONCE
Status: COMPLETED | OUTPATIENT
Start: 2022-01-27 | End: 2022-01-27

## 2022-01-27 RX ORDER — AMOXICILLIN 500 MG/1
500 CAPSULE ORAL 3 TIMES DAILY
Qty: 21 CAPSULE | Refills: 0 | Status: SHIPPED | OUTPATIENT
Start: 2022-01-27 | End: 2022-01-27

## 2022-01-27 RX ADMIN — KETOROLAC TROMETHAMINE 15 MG: 15 INJECTION, SOLUTION INTRAMUSCULAR; INTRAVENOUS at 10:32

## 2022-01-27 RX ADMIN — OXYCODONE HYDROCHLORIDE AND ACETAMINOPHEN 1 TABLET: 5; 325 TABLET ORAL at 11:21

## 2022-01-27 RX ADMIN — DEXAMETHASONE SODIUM PHOSPHATE 6 MG: 4 INJECTION, SOLUTION INTRA-ARTICULAR; INTRALESIONAL; INTRAMUSCULAR; INTRAVENOUS; SOFT TISSUE at 10:14

## 2022-01-27 RX ADMIN — SODIUM CHLORIDE 1000 ML: 9 INJECTION, SOLUTION INTRAVENOUS at 10:30

## 2022-01-27 RX ADMIN — ONDANSETRON 4 MG: 4 TABLET, ORALLY DISINTEGRATING ORAL at 10:13

## 2022-01-27 NOTE — ED PROVIDER NOTES
History     Chief Complaint   Patient presents with     Otalgia     HPI  Tatiana Garcia is a 36 year old female with history of scapular fracture, pregnancy who presents the emergency department not feeling well for the last couple of weeks.  Patient reports chills, body aches, no objective fever, sore throat, ear pain, decreased appetite, head pressure and cough.    Reviewed nurses notes below, similar history is related to me.  Tatiana Garcia is a 36 year old Female that presents to triage private car  With history of feeling unwell for almost 2 weeks and now worsening symptoms. Symptoms are head pressure, cough, sore throat, bilateral ear pain, body aches and decreased appetite. reported by patient      Allergies:  Allergies   Allergen Reactions     Ciprofloxacin Hives       Problem List:    Patient Active Problem List    Diagnosis Date Noted     Closed displaced fracture of body of right scapula 08/27/2020     Priority: Medium     Insufficient prenatal care 04/25/2007     Priority: Medium     Supervision of normal first pregnancy 12/17/2006     Priority: Medium        Past Medical History:    History reviewed. No pertinent past medical history.    Past Surgical History:    History reviewed. No pertinent surgical history.    Family History:    Family History   Problem Relation Age of Onset     Diabetes Maternal Grandfather         adult onset     Cerebrovascular Disease Maternal Grandmother      Hypertension Maternal Grandfather        Social History:  Marital Status:   [2]  Social History     Tobacco Use     Smoking status: Current Every Day Smoker     Years: 15.00     Types: Cigarettes     Smokeless tobacco: Never Used     Tobacco comment: 5 cigs per day   Substance Use Topics     Alcohol use: Yes     Comment: occasional     Drug use: Never        Medications:    amoxicillin (AMOXIL) 500 MG capsule  amphetamine-dextroamphetamine (ADDERALL XR) 20 MG 24 hr capsule  amphetamine-dextroamphetamine  (ADDERALL) 10 MG tablet  citalopram (CELEXA) 40 MG tablet  ibuprofen (ADVIL/MOTRIN) 600 MG tablet  PRENATAL VITAMINS OR TABS          Review of Systems   Constitutional: Positive for chills. Negative for fever.   HENT: Positive for sinus pressure and sinus pain. Negative for congestion.    Eyes: Negative for redness.   Respiratory: Positive for cough. Negative for shortness of breath.    Cardiovascular: Negative for chest pain.   Gastrointestinal: Negative for abdominal pain.   Genitourinary: Negative for difficulty urinating.   Musculoskeletal: Positive for myalgias. Negative for arthralgias and neck stiffness.   Skin: Negative for color change.   Neurological: Negative for headaches.   Psychiatric/Behavioral: Negative for confusion.       Physical Exam   BP: (!) 147/109  Pulse: 93  Temp: 98.1  F (36.7  C)  Resp: 22  SpO2: 97 %      Physical Exam  Vitals and nursing note reviewed.   Constitutional:       General: She is not in acute distress.     Appearance: She is not diaphoretic.   HENT:      Head: Atraumatic.      Right Ear: Tympanic membrane is erythematous and retracted. Tympanic membrane is not bulging.      Left Ear: Tympanic membrane is erythematous and retracted. Tympanic membrane is not bulging.      Nose:      Right Sinus: Maxillary sinus tenderness present. No frontal sinus tenderness.      Left Sinus: Maxillary sinus tenderness present. No frontal sinus tenderness.      Mouth/Throat:      Dentition: No gum lesions.      Pharynx: Uvula midline. Posterior oropharyngeal erythema present. No pharyngeal swelling, oropharyngeal exudate or uvula swelling.   Eyes:      General: No scleral icterus.     Pupils: Pupils are equal, round, and reactive to light.   Cardiovascular:      Heart sounds: Normal heart sounds.   Pulmonary:      Effort: No respiratory distress.      Breath sounds: Normal breath sounds.   Abdominal:      General: Bowel sounds are normal.      Palpations: Abdomen is soft.      Tenderness:  There is no abdominal tenderness.   Musculoskeletal:         General: No tenderness.   Skin:     General: Skin is warm.      Findings: No rash.         ED Course           No results found for this or any previous visit (from the past 24 hour(s)).    Medications   ondansetron (ZOFRAN-ODT) ODT tab 4 mg (4 mg Oral Given 1/27/22 1013)   dexamethasone (DECADRON) injectable solution used ORALLY 6 mg (6 mg Oral Given 1/27/22 1014)   0.9% sodium chloride BOLUS (0 mLs Intravenous Stopped 1/27/22 1143)   ketorolac (TORADOL) injection 15 mg (15 mg Intravenous Given 1/27/22 1032)   oxyCODONE-acetaminophen (PERCOCET) 5-325 MG per tablet 1 tablet (1 tablet Oral Given 1/27/22 1121)       Assessments & Plan (with Medical Decision Making)     I have reviewed the nursing notes.    I have reviewed the findings, diagnosis, plan and need for follow up with the patient.  Patient with significant cough and URI symptoms.  Due to duration of symptoms and suspicion for bacterial secondary infection regarding her sinus exam we will treat with empiric antibiotics and analgesia and antitussive treatment with Tylenol 3.  Return to the emergency department worsening symptoms such as uncontrolled fever, increasing shortness of breath, increasing headache or chest pain.  Patient verbalized understanding plan agreement she left the ED improving condition.    Discharge Medication List as of 1/27/2022 11:53 AM      START taking these medications    Details   acetaminophen-codeine (TYLENOL #3) 300-30 MG tablet Take 1 tablet by mouth every 6 hours as needed for severe pain, Disp-10 tablet, R-0, E-Prescribe         Amoxicillin 500 p.o. 3 times daily for 7 days    Final diagnoses:   Infection due to 2019 novel coronavirus   Acute non-recurrent maxillary sinusitis       1/27/2022   [facility]     Luc Morales MD  01/31/22 1941

## 2022-01-27 NOTE — ED TRIAGE NOTES
ED Nursing Triage Note (General)   ________________________________    Tatiana Garcia is a 36 year old Female that presents to triage private car  With history of feeling unwell for almost 2 weeks and now worsening symptoms. Symptoms are head pressure, cough, sore throat, bilateral ear pain, body aches and decreased appetite. reported by patient     BP (!) 147/109   Pulse 93   Temp 98.1  F (36.7  C) (Tympanic)   Resp 22   SpO2 97% t  Patient appears alert  and oriented, in moderate distress., and cooperative and calm behavior.  Depression:   GCS Total = 15  Airway: intact  Breathing noted as Normal  Circulation Normal  Skin:  Normal  Action taken:  Isolation precautions initiated      PRE HOSPITAL PRIOR LIVING SITUATION Children Only

## 2022-01-31 ASSESSMENT — ENCOUNTER SYMPTOMS
CONFUSION: 0
ARTHRALGIAS: 0
COLOR CHANGE: 0
ABDOMINAL PAIN: 0
SINUS PRESSURE: 1
SINUS PAIN: 1
SHORTNESS OF BREATH: 0
HEADACHES: 0
MYALGIAS: 1
EYE REDNESS: 0
COUGH: 1
FEVER: 0
DIFFICULTY URINATING: 0
CHILLS: 1
NECK STIFFNESS: 0

## 2022-04-18 ENCOUNTER — HOSPITAL ENCOUNTER (EMERGENCY)
Facility: OTHER | Age: 37
Discharge: HOME | End: 2022-04-18
Attending: EMERGENCY MEDICINE | Admitting: EMERGENCY MEDICINE
Payer: COMMERCIAL

## 2022-04-18 ENCOUNTER — APPOINTMENT (OUTPATIENT)
Dept: GENERAL RADIOLOGY | Facility: OTHER | Age: 37
End: 2022-04-18
Attending: EMERGENCY MEDICINE
Payer: COMMERCIAL

## 2022-04-18 ENCOUNTER — OFFICE VISIT (OUTPATIENT)
Dept: ORTHOPEDICS | Facility: OTHER | Age: 37
End: 2022-04-18
Attending: ORTHOPAEDIC SURGERY
Payer: COMMERCIAL

## 2022-04-18 VITALS
TEMPERATURE: 99 F | HEART RATE: 86 BPM | BODY MASS INDEX: 32.1 KG/M2 | WEIGHT: 170 LBS | RESPIRATION RATE: 16 BRPM | DIASTOLIC BLOOD PRESSURE: 84 MMHG | SYSTOLIC BLOOD PRESSURE: 149 MMHG | HEIGHT: 61 IN | OXYGEN SATURATION: 99 %

## 2022-04-18 VITALS
RESPIRATION RATE: 16 BRPM | HEIGHT: 61 IN | DIASTOLIC BLOOD PRESSURE: 80 MMHG | BODY MASS INDEX: 32.1 KG/M2 | WEIGHT: 170 LBS | HEART RATE: 93 BPM | SYSTOLIC BLOOD PRESSURE: 130 MMHG | OXYGEN SATURATION: 98 %

## 2022-04-18 DIAGNOSIS — S82.832A CLOSED FRACTURE OF DISTAL END OF LEFT FIBULA, UNSPECIFIED FRACTURE MORPHOLOGY, INITIAL ENCOUNTER: ICD-10-CM

## 2022-04-18 DIAGNOSIS — S82.892A ANKLE FRACTURE, LEFT, CLOSED, INITIAL ENCOUNTER: Primary | ICD-10-CM

## 2022-04-18 PROCEDURE — 99283 EMERGENCY DEPT VISIT LOW MDM: CPT | Performed by: EMERGENCY MEDICINE

## 2022-04-18 PROCEDURE — 250N000011 HC RX IP 250 OP 636: Performed by: EMERGENCY MEDICINE

## 2022-04-18 PROCEDURE — 96372 THER/PROPH/DIAG INJ SC/IM: CPT | Performed by: EMERGENCY MEDICINE

## 2022-04-18 PROCEDURE — G0463 HOSPITAL OUTPT CLINIC VISIT: HCPCS

## 2022-04-18 PROCEDURE — 99213 OFFICE O/P EST LOW 20 MIN: CPT | Mod: 57 | Performed by: ORTHOPAEDIC SURGERY

## 2022-04-18 PROCEDURE — G0463 HOSPITAL OUTPT CLINIC VISIT: HCPCS | Mod: 25

## 2022-04-18 PROCEDURE — 73610 X-RAY EXAM OF ANKLE: CPT | Mod: LT

## 2022-04-18 PROCEDURE — 99284 EMERGENCY DEPT VISIT MOD MDM: CPT | Performed by: EMERGENCY MEDICINE

## 2022-04-18 PROCEDURE — 250N000013 HC RX MED GY IP 250 OP 250 PS 637: Performed by: EMERGENCY MEDICINE

## 2022-04-18 RX ORDER — KETOROLAC TROMETHAMINE 30 MG/ML
60 INJECTION, SOLUTION INTRAMUSCULAR; INTRAVENOUS ONCE
Status: COMPLETED | OUTPATIENT
Start: 2022-04-18 | End: 2022-04-18

## 2022-04-18 RX ORDER — HYDROCODONE BITARTRATE AND ACETAMINOPHEN 5; 325 MG/1; MG/1
1 TABLET ORAL ONCE
Status: COMPLETED | OUTPATIENT
Start: 2022-04-18 | End: 2022-04-18

## 2022-04-18 RX ORDER — HYDROCODONE BITARTRATE AND ACETAMINOPHEN 5; 325 MG/1; MG/1
1-2 TABLET ORAL EVERY 6 HOURS PRN
Qty: 18 TABLET | Refills: 0 | Status: SHIPPED | OUTPATIENT
Start: 2022-04-18 | End: 2022-04-21

## 2022-04-18 RX ADMIN — HYDROCODONE BITARTRATE AND ACETAMINOPHEN 1 TABLET: 5; 325 TABLET ORAL at 10:36

## 2022-04-18 RX ADMIN — KETOROLAC TROMETHAMINE 60 MG: 30 INJECTION, SOLUTION INTRAMUSCULAR at 09:39

## 2022-04-18 ASSESSMENT — ENCOUNTER SYMPTOMS
WOUND: 0
COLOR CHANGE: 1
CHILLS: 0
CHEST TIGHTNESS: 0
LIGHT-HEADEDNESS: 0
AGITATION: 0
DYSURIA: 0
NAUSEA: 0
SHORTNESS OF BREATH: 0
ARTHRALGIAS: 1
VOMITING: 0
FEVER: 0

## 2022-04-18 ASSESSMENT — PAIN SCALES - GENERAL: PAINLEVEL: WORST PAIN (10)
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
## 2022-04-18 NOTE — PROGRESS NOTES
"Chief Complaint   Patient presents with     Consult     Left fib fx       Initial There were no vitals taken for this visit. Estimated body mass index is 31.86 kg/m  as calculated from the following:    Height as of an earlier encounter on 4/18/22: 1.556 m (5' 1.25\").    Weight as of an earlier encounter on 4/18/22: 77.1 kg (170 lb).  Medication Reconciliation: complete    Jessica Wu LPN    "
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
## 2022-04-18 NOTE — ED PROVIDER NOTES
"  History     Chief Complaint   Patient presents with     Ankle Pain     HPI  Tatiana Garcia is a 36 year old female who is here with ankle pain.  Yesterday she took out the dog on a leash.  She tripped and was dragged down \"3 flights of stairs\".  She says she hurt her ankle crack.  Since then she has had increased swelling, bruising, and she cannot bear any weight on it.  Also hurt her right shoulder a little bit, however she is really not too concerned about that.    Allergies:  Allergies   Allergen Reactions     Ciprofloxacin Hives       Problem List:    Patient Active Problem List    Diagnosis Date Noted     Closed displaced fracture of body of right scapula 08/27/2020     Priority: Medium     Insufficient prenatal care 04/25/2007     Priority: Medium     Supervision of normal first pregnancy 12/17/2006     Priority: Medium        Past Medical History:    History reviewed. No pertinent past medical history.    Past Surgical History:    History reviewed. No pertinent surgical history.    Family History:    Family History   Problem Relation Age of Onset     Diabetes Maternal Grandfather         adult onset     Cerebrovascular Disease Maternal Grandmother      Hypertension Maternal Grandfather        Social History:  Marital Status:   [2]  Social History     Tobacco Use     Smoking status: Current Every Day Smoker     Packs/day: 0.25     Years: 15.00     Pack years: 3.75     Types: Cigarettes     Smokeless tobacco: Never Used     Tobacco comment: 5 cigs per day   Substance Use Topics     Alcohol use: Yes     Comment: occasional     Drug use: Never        Medications:    HYDROcodone-acetaminophen (NORCO) 5-325 MG tablet  amoxicillin (AMOXIL) 500 MG capsule  amphetamine-dextroamphetamine (ADDERALL XR) 20 MG 24 hr capsule  amphetamine-dextroamphetamine (ADDERALL) 10 MG tablet  citalopram (CELEXA) 40 MG tablet  ibuprofen (ADVIL/MOTRIN) 600 MG tablet  PRENATAL VITAMINS OR TABS          Review of Systems " "  Constitutional: Negative for chills and fever.   HENT: Negative for congestion.    Eyes: Negative for visual disturbance.   Respiratory: Negative for chest tightness and shortness of breath.    Cardiovascular: Negative for chest pain.   Gastrointestinal: Negative for nausea and vomiting.   Genitourinary: Negative for dysuria.   Musculoskeletal: Positive for arthralgias.   Skin: Positive for color change. Negative for wound.   Neurological: Negative for light-headedness.   Psychiatric/Behavioral: Negative for agitation.       Physical Exam   BP: (!) 157/87  Pulse: 101  Temp: 99  F (37.2  C)  Resp: 16  Height: 155.6 cm (5' 1.25\")  Weight: 77.1 kg (170 lb)  SpO2: 100 %      Physical Exam  Vitals and nursing note reviewed.   Constitutional:       Appearance: Normal appearance.   HENT:      Head: Normocephalic and atraumatic.      Mouth/Throat:      Mouth: Mucous membranes are moist.   Eyes:      Conjunctiva/sclera: Conjunctivae normal.   Cardiovascular:      Rate and Rhythm: Normal rate.   Pulmonary:      Effort: Pulmonary effort is normal.   Musculoskeletal:      Comments: Left ankle significantly swollen, ecchymotic.  Ecchymosis is worse below medial malleolus.  Very tender everywhere.  Neurovascularly intact distally.   Skin:     General: Skin is warm and dry.   Neurological:      Mental Status: She is alert and oriented to person, place, and time.   Psychiatric:         Mood and Affect: Mood normal.         Behavior: Behavior normal.         ED Course                 Procedures                Results for orders placed or performed during the hospital encounter of 04/18/22 (from the past 24 hour(s))   XR Ankle Left G/E 3 Views    Narrative    PROCEDURE:  XR ANKLE LEFT G/E 3 VIEWS    HISTORY: fall.    COMPARISON:  None.    TECHNIQUE:  3 views left ankle.    FINDINGS:  There is an acute fracture of the distal left fibula with  lateral displacement measuring 4 mm. The margin is grossly congruent.  Circumferential soft " tissue swelling is noted. Consider radiographs of  the knee to assess for a proximal fibula or tibial fracture if  clinically warranted.       Impression    IMPRESSION: Acute distal diaphyseal fracture of the left fibula.      MELY ALVARADO MD         SYSTEM ID:  [id number]       Medications   ketorolac (TORADOL) injection 60 mg (60 mg Intramuscular Given 4/18/22 4915)   HYDROcodone-acetaminophen (NORCO) 5-325 MG per tablet 1 tablet (1 tablet Oral Given 4/18/22 1036)       Assessments & Plan (with Medical Decision Making)     I have reviewed the nursing notes.    I have reviewed the findings, diagnosis, plan and need for follow up with the patient.  Distal fibula fracture as above.  She is placed in a cast boot and given crutches.  We we will have her follow-up in the next few days with orthopedics for more definitive management.  Return if worse    New Prescriptions    HYDROCODONE-ACETAMINOPHEN (NORCO) 5-325 MG TABLET    Take 1-2 tablets by mouth every 6 hours as needed for pain       Final diagnoses:   Closed fracture of distal end of left fibula, unspecified fracture morphology, initial encounter       4/18/2022   [facility] AND [facility]     Miguel Russ MD  04/18/22 9039

## 2022-04-19 ENCOUNTER — PATIENT OUTREACH (OUTPATIENT)
Dept: FAMILY MEDICINE | Facility: CLINIC | Age: 37
End: 2022-04-19
Payer: COMMERCIAL

## 2022-04-28 ENCOUNTER — HOSPITAL ENCOUNTER (EMERGENCY)
Facility: OTHER | Age: 37
Discharge: HOME | End: 2022-04-28
Attending: PHYSICIAN ASSISTANT | Admitting: PHYSICIAN ASSISTANT
Payer: COMMERCIAL

## 2022-04-28 VITALS
TEMPERATURE: 98.2 F | BODY MASS INDEX: 32.1 KG/M2 | DIASTOLIC BLOOD PRESSURE: 75 MMHG | HEART RATE: 86 BPM | OXYGEN SATURATION: 98 % | HEIGHT: 61 IN | SYSTOLIC BLOOD PRESSURE: 118 MMHG | RESPIRATION RATE: 16 BRPM | WEIGHT: 170 LBS

## 2022-04-28 DIAGNOSIS — T50.901A ACCIDENTAL DRUG OVERDOSE, INITIAL ENCOUNTER: ICD-10-CM

## 2022-04-28 LAB
ALBUMIN SERPL-MCNC: 4.1 G/DL (ref 3.5–5.7)
ALBUMIN SERPL-MCNC: 4.1 G/DL (ref 3.5–5.7)
ALBUMIN UR-MCNC: 10 MG/DL
ALP SERPL-CCNC: 87 U/L (ref 34–104)
ALP SERPL-CCNC: 89 U/L (ref 34–104)
ALT SERPL W P-5'-P-CCNC: 45 U/L (ref 7–52)
ALT SERPL W P-5'-P-CCNC: 45 U/L (ref 7–52)
AMPHETAMINES UR QL: NOT DETECTED
ANION GAP SERPL CALCULATED.3IONS-SCNC: 10 MMOL/L (ref 3–14)
APAP SERPL-MCNC: 29 MG/L (ref 10–30)
APPEARANCE UR: ABNORMAL
AST SERPL W P-5'-P-CCNC: 39 U/L (ref 13–39)
AST SERPL W P-5'-P-CCNC: 39 U/L (ref 13–39)
BACTERIA #/AREA URNS HPF: ABNORMAL /HPF
BARBITURATES UR QL SCN: NOT DETECTED
BASOPHILS # BLD AUTO: 0 10E3/UL (ref 0–0.2)
BASOPHILS NFR BLD AUTO: 0 %
BENZODIAZ UR QL SCN: NOT DETECTED
BILIRUB DIRECT SERPL-MCNC: <0.1 MG/DL (ref 0–0.2)
BILIRUB SERPL-MCNC: 0.3 MG/DL (ref 0.3–1)
BILIRUB SERPL-MCNC: 0.3 MG/DL (ref 0.3–1)
BILIRUB UR QL STRIP: NEGATIVE
BUN SERPL-MCNC: 10 MG/DL (ref 7–25)
BUPRENORPHINE UR QL: NOT DETECTED
CALCIUM SERPL-MCNC: 9.6 MG/DL (ref 8.6–10.3)
CANNABINOIDS UR QL: ABNORMAL
CHLORIDE BLD-SCNC: 100 MMOL/L (ref 98–107)
CO2 SERPL-SCNC: 27 MMOL/L (ref 21–31)
COCAINE UR QL SCN: NOT DETECTED
COLOR UR AUTO: ABNORMAL
CREAT SERPL-MCNC: 0.65 MG/DL (ref 0.6–1.2)
D-METHAMPHET UR QL: NOT DETECTED
EOSINOPHIL # BLD AUTO: 0.2 10E3/UL (ref 0–0.7)
EOSINOPHIL NFR BLD AUTO: 2 %
ERYTHROCYTE [DISTWIDTH] IN BLOOD BY AUTOMATED COUNT: 15 % (ref 10–15)
ETHANOL SERPL-MCNC: <0.01 G/DL
GFR SERPL CREATININE-BSD FRML MDRD: >90 ML/MIN/1.73M2
GLUCOSE BLD-MCNC: 100 MG/DL (ref 70–105)
GLUCOSE UR STRIP-MCNC: NEGATIVE MG/DL
HCT VFR BLD AUTO: 37.3 % (ref 35–47)
HGB BLD-MCNC: 11.9 G/DL (ref 11.7–15.7)
HGB UR QL STRIP: ABNORMAL
IMM GRANULOCYTES # BLD: 0.2 10E3/UL
IMM GRANULOCYTES NFR BLD: 2 %
KETONES UR STRIP-MCNC: NEGATIVE MG/DL
LEUKOCYTE ESTERASE UR QL STRIP: NEGATIVE
LYMPHOCYTES # BLD AUTO: 1.5 10E3/UL (ref 0.8–5.3)
LYMPHOCYTES NFR BLD AUTO: 15 %
MCH RBC QN AUTO: 27.4 PG (ref 26.5–33)
MCHC RBC AUTO-ENTMCNC: 31.9 G/DL (ref 31.5–36.5)
MCV RBC AUTO: 86 FL (ref 78–100)
METHADONE UR QL SCN: NOT DETECTED
MONOCYTES # BLD AUTO: 1.2 10E3/UL (ref 0–1.3)
MONOCYTES NFR BLD AUTO: 12 %
MUCOUS THREADS #/AREA URNS LPF: PRESENT /LPF
NEUTROPHILS # BLD AUTO: 6.9 10E3/UL (ref 1.6–8.3)
NEUTROPHILS NFR BLD AUTO: 69 %
NITRATE UR QL: NEGATIVE
NRBC # BLD AUTO: 0 10E3/UL
NRBC BLD AUTO-RTO: 0 /100
OPIATES UR QL SCN: ABNORMAL
OXYCODONE UR QL SCN: NOT DETECTED
PCP UR QL SCN: NOT DETECTED
PH UR STRIP: 7.5 [PH] (ref 5–9)
PLATELET # BLD AUTO: 375 10E3/UL (ref 150–450)
POTASSIUM BLD-SCNC: 3.8 MMOL/L (ref 3.5–5.1)
PROPOXYPH UR QL: NOT DETECTED
PROT SERPL-MCNC: 6.6 G/DL (ref 6.4–8.9)
PROT SERPL-MCNC: 6.6 G/DL (ref 6.4–8.9)
RBC # BLD AUTO: 4.34 10E6/UL (ref 3.8–5.2)
RBC URINE: >182 /HPF
SODIUM SERPL-SCNC: 137 MMOL/L (ref 134–144)
SP GR UR STRIP: 1.02 (ref 1–1.03)
SQUAMOUS EPITHELIAL: 4 /HPF
TRICYCLICS UR QL SCN: NOT DETECTED
UROBILINOGEN UR STRIP-MCNC: NORMAL MG/DL
WBC # BLD AUTO: 10 10E3/UL (ref 4–11)
WBC URINE: 2 /HPF

## 2022-04-28 PROCEDURE — 80143 DRUG ASSAY ACETAMINOPHEN: CPT | Performed by: PHYSICIAN ASSISTANT

## 2022-04-28 PROCEDURE — 258N000003 HC RX IP 258 OP 636: Performed by: PHYSICIAN ASSISTANT

## 2022-04-28 PROCEDURE — 99284 EMERGENCY DEPT VISIT MOD MDM: CPT | Performed by: PHYSICIAN ASSISTANT

## 2022-04-28 PROCEDURE — 36415 COLL VENOUS BLD VENIPUNCTURE: CPT | Performed by: PHYSICIAN ASSISTANT

## 2022-04-28 PROCEDURE — 99284 EMERGENCY DEPT VISIT MOD MDM: CPT | Mod: 25 | Performed by: PHYSICIAN ASSISTANT

## 2022-04-28 PROCEDURE — 81003 URINALYSIS AUTO W/O SCOPE: CPT | Performed by: PHYSICIAN ASSISTANT

## 2022-04-28 PROCEDURE — 96361 HYDRATE IV INFUSION ADD-ON: CPT | Performed by: PHYSICIAN ASSISTANT

## 2022-04-28 PROCEDURE — 85018 HEMOGLOBIN: CPT | Performed by: PHYSICIAN ASSISTANT

## 2022-04-28 PROCEDURE — 80306 DRUG TEST PRSMV INSTRMNT: CPT | Performed by: PHYSICIAN ASSISTANT

## 2022-04-28 PROCEDURE — 82077 ASSAY SPEC XCP UR&BREATH IA: CPT | Performed by: PHYSICIAN ASSISTANT

## 2022-04-28 PROCEDURE — 96360 HYDRATION IV INFUSION INIT: CPT | Performed by: PHYSICIAN ASSISTANT

## 2022-04-28 PROCEDURE — 93005 ELECTROCARDIOGRAM TRACING: CPT | Performed by: PHYSICIAN ASSISTANT

## 2022-04-28 PROCEDURE — 82248 BILIRUBIN DIRECT: CPT | Performed by: PHYSICIAN ASSISTANT

## 2022-04-28 PROCEDURE — 93010 ELECTROCARDIOGRAM REPORT: CPT | Performed by: INTERNAL MEDICINE

## 2022-04-28 PROCEDURE — 85048 AUTOMATED LEUKOCYTE COUNT: CPT | Performed by: PHYSICIAN ASSISTANT

## 2022-04-28 RX ORDER — HYDROCODONE BITARTRATE AND ACETAMINOPHEN 5; 325 MG/1; MG/1
TABLET ORAL
COMMUNITY
Start: 2022-04-21 | End: 2022-11-24

## 2022-04-28 RX ORDER — SODIUM CHLORIDE 9 MG/ML
INJECTION, SOLUTION INTRAVENOUS CONTINUOUS
Status: DISCONTINUED | OUTPATIENT
Start: 2022-04-28 | End: 2022-04-28 | Stop reason: HOSPADM

## 2022-04-28 RX ADMIN — SODIUM CHLORIDE: 9 INJECTION, SOLUTION INTRAVENOUS at 13:50

## 2022-04-28 NOTE — ED NOTES
Poison control contacted, they reported to draw LFTs, INR and tylenol now and again at 3:30, if tylenol greater than 150 or abnormal LFTs, pt should be admitted with acetylcysteine

## 2022-04-28 NOTE — ED TRIAGE NOTES
Pt here by herself, pt reports that she just had an ankle surgery around 9 days ago, pt reports being in severe pain this AM and accidentally taking 5 of her hydrocodone instead of the tylenol this AM, pt reports that she has been taking tylenol frequently, but last had any meds around 11 pm last night, pt is sleepy and denies any suicidal ideation, pt into [facility] via [facility]     Triage Assessment     Row Name 04/28/22 1214       Triage Assessment (Adult)    Airway WDL WDL    Additional Documentation Breath Sounds (Group)       Breath Sounds    Breath Sounds All Fields       Skin Circulation/Temperature WDL    Skin Circulation/Temperature WDL WDL       Cardiac WDL    Cardiac WDL WDL       Peripheral/Neurovascular WDL    Peripheral Neurovascular WDL WDL       Cognitive/Neuro/Behavioral WDL    Cognitive/Neuro/Behavioral WDL WDL       Duke Coma Scale    Best Eye Response 4-->(E4) spontaneous    Best Motor Response 6-->(M6) obeys commands    Best Verbal Response 5-->(V5) oriented    Duke Coma Scale Score 15

## 2022-04-28 NOTE — DISCHARGE INSTRUCTIONS
Avoid tylenol or medications with tylenol in them for at least 24 hours. If you do need to take medications please take them as prescribed.  Tylenol can be given every 6-8 hours and you can take 1000 mg within that time.  Which will be 2 tablets every 6-8 hours.  Please do not exceed that if you have to take Norco or Vicodin that does have Tylenol in it.  Please do not take additional Tylenol as this is an overdose.

## 2022-04-28 NOTE — ED NOTES
"Patient did not give UA when up to the bathroom said \"it's just too difficult\".   " [facility]

## 2022-04-28 NOTE — ED PROVIDER NOTES
History     Chief Complaint   Patient presents with     Drug Overdose     HPI  Tatiana Garcia is a 36 year old female who inadvertently took 5 tablets of Norco.  She became concerned and presented to the emergency department for evaluation.  Is minus and she also takes 2 to 3 tablets of Tylenol at a time also.  This happened about 20 minutes prior to coming the emergency department.  She is alert she is oriented she answers questions appropriately she is nontoxic this was accidental it was not purposeful and she is not suicidal or homicidal.  She does not have any headaches dizziness visual disturbances cough no neck pain chest pain shortness of breath no abdominal pain or constipation no loss of bowel bladder function rashes or trauma complains of left lower extremity discomfort she is postop.    Allergies:  Allergies   Allergen Reactions     Ciprofloxacin Hives       Problem List:    Patient Active Problem List    Diagnosis Date Noted     Closed displaced fracture of body of right scapula 08/27/2020     Priority: Medium     Insufficient prenatal care 04/25/2007     Priority: Medium     Supervision of normal first pregnancy 12/17/2006     Priority: Medium        Past Medical History:    No past medical history on file.    Past Surgical History:    No past surgical history on file.    Family History:    Family History   Problem Relation Age of Onset     Diabetes Maternal Grandfather         adult onset     Cerebrovascular Disease Maternal Grandmother      Hypertension Maternal Grandfather        Social History:  Marital Status:   [2]  Social History     Tobacco Use     Smoking status: Current Every Day Smoker     Packs/day: 0.25     Years: 15.00     Pack years: 3.75     Types: Cigarettes     Smokeless tobacco: Never Used     Tobacco comment: 5 cigs per day   Vaping Use     Vaping Use: Never used   Substance Use Topics     Alcohol use: Yes     Comment: occasional     Drug use: Never        Medications:   "  HYDROcodone-acetaminophen (NORCO) 5-325 MG tablet  amoxicillin (AMOXIL) 500 MG capsule  amphetamine-dextroamphetamine (ADDERALL XR) 20 MG 24 hr capsule  amphetamine-dextroamphetamine (ADDERALL) 10 MG tablet  citalopram (CELEXA) 40 MG tablet  ibuprofen (ADVIL/MOTRIN) 600 MG tablet  PRENATAL VITAMINS OR TABS          Review of Systems  Please see HPI for pertinent positives and negatives.  All other systems reviewed and found to be negative.    Physical Exam   BP: (!) 148/105  Pulse: (!) 132  Temp: 98.2  F (36.8  C)  Resp: 16  Height: 154.9 cm (5' 1\")  Weight: 77.1 kg (170 lb)  SpO2: 99 %    Vitals:    04/28/22 1211 04/28/22 1547 04/28/22 1600 04/28/22 1615   BP: (!) 148/105 (!) 141/94 118/76 118/75   Pulse: (!) 132 91 86 86   Resp: 16      Temp: 98.2  F (36.8  C)      TempSrc: Tympanic      SpO2: 99% 98% 98% 98%   Weight: 77.1 kg (170 lb)      Height: 1.549 m (5' 1\")            Physical Exam  Exam:  Constitutional: healthy, alert and no distress  Head: Normocephalic.    Neck: Neck supple.    ENT: ENT exam normal, no neck nodes or sinus tenderness  Cardiovascular:   RRR. No murmurs, clicks gallops or rub  Respiratory: negative, Percussion normal. Good diaphragmatic excursion. Lungs clear  Gastrointestinal: Abdomen soft, non-tender. BS normal. No masses, organomegaly  : Deferred  Musculoskeletal: Patient does have a splint that is on her left lower extremity.  The splint was not removed there is no fracture blisters noted her pulses are present distally and CMS is intact.  She does not have any pain proximally.  She has full range of motion of her digits.  She does not have pain is out of proportion temperature changes or discoloration of her lower extremity.  At this time I do not believe she has any symptoms consistent with a compartment syndrome.  However precautions were discussed.  Skin: no suspicious lesions or rashes no blisters drainage or other areas of concern.  Neurologic:Sensation grossly " WNL.  Psychiatric: mentation appears normal and affect normal/bright       ED Course               Results for orders placed or performed during the hospital encounter of 04/28/22 (from the past 24 hour(s))   CBC with platelets differential    Narrative    The following orders were created for panel order CBC with platelets differential.  Procedure                               Abnormality         Status                     ---------                               -----------         ------                     CBC with platelets and d...[[id number]]                      Final result                 Please view results for these tests on the individual orders.   Comprehensive metabolic panel   Result Value Ref Range    Sodium 137 134 - 144 mmol/L    Potassium 3.8 3.5 - 5.1 mmol/L    Chloride 100 98 - 107 mmol/L    Carbon Dioxide (CO2) 27 21 - 31 mmol/L    Anion Gap 10 3 - 14 mmol/L    Urea Nitrogen 10 7 - 25 mg/dL    Creatinine 0.65 0.60 - 1.20 mg/dL    Calcium 9.6 8.6 - 10.3 mg/dL    Glucose 100 70 - 105 mg/dL    Alkaline Phosphatase 87 34 - 104 U/L    AST 39 13 - 39 U/L    ALT 45 7 - 52 U/L    Protein Total 6.6 6.4 - 8.9 g/dL    Albumin 4.1 3.5 - 5.7 g/dL    Bilirubin Total 0.3 0.3 - 1.0 mg/dL    GFR Estimate >90 >60 mL/min/1.73m2   Ethanol GH   Result Value Ref Range    Alcohol ethyl <0.01 <=0.01 g/dL   CBC with platelets and differential   Result Value Ref Range    WBC Count 10.0 4.0 - 11.0 10e3/uL    RBC Count 4.34 3.80 - 5.20 10e6/uL    Hemoglobin 11.9 11.7 - 15.7 g/dL    Hematocrit 37.3 35.0 - 47.0 %    MCV 86 78 - 100 fL    MCH 27.4 26.5 - 33.0 pg    MCHC 31.9 31.5 - 36.5 g/dL    RDW 15.0 10.0 - 15.0 %    Platelet Count 375 150 - 450 10e3/uL    % Neutrophils 69 %    % Lymphocytes 15 %    % Monocytes 12 %    % Eosinophils 2 %    % Basophils 0 %    % Immature Granulocytes 2 %    NRBCs per 100 WBC 0 <1 /100    Absolute Neutrophils 6.9 1.6 - 8.3 10e3/uL    Absolute Lymphocytes 1.5 0.8 - 5.3 10e3/uL    Absolute  Monocytes 1.2 0.0 - 1.3 10e3/uL    Absolute Eosinophils 0.2 0.0 - 0.7 10e3/uL    Absolute Basophils 0.0 0.0 - 0.2 10e3/uL    Absolute Immature Granulocytes 0.2 <=0.4 10e3/uL    Absolute NRBCs 0.0 10e3/uL   Urine Drugs of Abuse Screen    Narrative    The following orders were created for panel order Urine Drugs of Abuse Screen.  Procedure                               Abnormality         Status                     ---------                               -----------         ------                     Urine Drugs of Abuse Scr...[[id number]]  Abnormal            Final result                 Please view results for these tests on the individual orders.   UA with Microscopic reflex to Culture    Specimen: Urine, Clean Catch   Result Value Ref Range    Color Urine Light Yellow Colorless, Straw, Light Yellow, Yellow    Appearance Urine Slightly Cloudy (A) Clear    Glucose Urine Negative Negative mg/dL    Bilirubin Urine Negative Negative    Ketones Urine Negative Negative mg/dL    Specific Gravity Urine 1.023 1.000 - 1.030    Blood Urine Large (A) Negative    pH Urine 7.5 5.0 - 9.0    Protein Albumin Urine 10  (A) Negative mg/dL    Urobilinogen Urine Normal Normal, 2.0 mg/dL    Nitrite Urine Negative Negative    Leukocyte Esterase Urine Negative Negative    Bacteria Urine Few (A) None Seen /HPF    Mucus Urine Present (A) None Seen /LPF    RBC Urine >182 (H) <=2 /HPF    WBC Urine 2 <=5 /HPF    Squamous Epithelials Urine 4 (H) <=1 /HPF    Narrative    Urine Culture not indicated   Urine Drugs of Abuse Screen Panel 13   Result Value Ref Range    Cannabinoids (23-dqg-0-carboxy-9-THC) Presumptive positive-Unconfirmed result (A) Not Detected    Phencyclidine Not Detected Not Detected    Cocaine (Benzoylecgonine) Not Detected Not Detected    Methamphetamine (d-Methamphetamine) Not Detected Not Detected    Opiates (Morphine) Presumptive positive-Unconfirmed result (A) Not Detected    Amphetamine (d-Amphetamine) Not Detected Not  Detected    Benzodiazepines (Nordiazepam) Not Detected Not Detected    Tricyclic Antidepressants (Desipramine) Not Detected Not Detected    Methadone Not Detected Not Detected    Barbiturates (Butalbital) Not Detected Not Detected    Oxycodone Not Detected Not Detected    Propoxyphene (Norpropoxyphene) Not Detected Not Detected    Buprenorphine Not Detected Not Detected   Acetaminophen level   Result Value Ref Range    Acetaminophen 29 10 - 30 mg/L   Hepatic panel   Result Value Ref Range    Bilirubin Total 0.3 0.3 - 1.0 mg/dL    Bilirubin Direct <0.1 0.0 - 0.2 mg/dL    Protein Total 6.6 6.4 - 8.9 g/dL    Albumin 4.1 3.5 - 5.7 g/dL    Alkaline Phosphatase 89 34 - 104 U/L    AST 39 13 - 39 U/L    ALT 45 7 - 52 U/L       Medications   sodium chloride 0.9% infusion ( Intravenous New Bag 4/28/22 1350)       Assessments & Plan (with Medical Decision Making)     I have reviewed the nursing notes.    I have reviewed the findings, diagnosis, plan and need for follow up with the patient.  Pleasant female who presents to the emergency department who accidentally overdosed on 5 tablets of Norco.  Poison control was notified.  A 4-hour Tylenol level has been obtained and it is 29.  Her liver enzymes are unremarkable.  Rest of her laboratory studies are noted.  She did really see if a fluid bolus here in the emergency department as she was tachycardic on initial presentation and that is now down into the 80s.  She will be discharged home alert and oriented in improved condition with precautions both splint precautions and how to take Tylenol and what constitutes overdosing on Tylenol.  If symptoms worsen if there is further concerns she should return the emergency department.      New Prescriptions    No medications on file       Final diagnoses:   Accidental drug overdose, initial encounter       4/28/2022   [facility] AND [facility]     Martínez Hansen PA-C  04/28/22 0990

## 2022-04-29 DIAGNOSIS — Z98.890 S/P ORIF (OPEN REDUCTION INTERNAL FIXATION) FRACTURE: Primary | ICD-10-CM

## 2022-04-29 DIAGNOSIS — Z87.81 S/P ORIF (OPEN REDUCTION INTERNAL FIXATION) FRACTURE: Primary | ICD-10-CM

## 2022-04-29 LAB
ATRIAL RATE - MUSE: 114 BPM
DIASTOLIC BLOOD PRESSURE - MUSE: NORMAL MMHG
INTERPRETATION ECG - MUSE: NORMAL
P AXIS - MUSE: 50 DEGREES
PR INTERVAL - MUSE: 124 MS
QRS DURATION - MUSE: 86 MS
QT - MUSE: 332 MS
QTC - MUSE: 457 MS
R AXIS - MUSE: 57 DEGREES
SYSTOLIC BLOOD PRESSURE - MUSE: NORMAL MMHG
T AXIS - MUSE: -5 DEGREES
VENTRICULAR RATE- MUSE: 114 BPM

## 2022-05-02 ENCOUNTER — OFFICE VISIT (OUTPATIENT)
Dept: ORTHOPEDICS | Facility: OTHER | Age: 37
End: 2022-05-02
Attending: ORTHOPAEDIC SURGERY
Payer: COMMERCIAL

## 2022-05-02 ENCOUNTER — HOSPITAL ENCOUNTER (OUTPATIENT)
Dept: GENERAL RADIOLOGY | Facility: OTHER | Age: 37
Discharge: HOME | End: 2022-05-02
Attending: ORTHOPAEDIC SURGERY
Payer: COMMERCIAL

## 2022-05-02 DIAGNOSIS — Z98.890 S/P ORIF (OPEN REDUCTION INTERNAL FIXATION) FRACTURE: Primary | ICD-10-CM

## 2022-05-02 DIAGNOSIS — Z87.81 S/P ORIF (OPEN REDUCTION INTERNAL FIXATION) FRACTURE: ICD-10-CM

## 2022-05-02 DIAGNOSIS — Z87.81 S/P ORIF (OPEN REDUCTION INTERNAL FIXATION) FRACTURE: Primary | ICD-10-CM

## 2022-05-02 DIAGNOSIS — Z98.890 S/P ORIF (OPEN REDUCTION INTERNAL FIXATION) FRACTURE: ICD-10-CM

## 2022-05-02 PROCEDURE — G0463 HOSPITAL OUTPT CLINIC VISIT: HCPCS

## 2022-05-02 PROCEDURE — 73610 X-RAY EXAM OF ANKLE: CPT | Mod: LT

## 2022-05-02 PROCEDURE — 99024 POSTOP FOLLOW-UP VISIT: CPT | Performed by: ORTHOPAEDIC SURGERY

## 2022-05-02 NOTE — PROGRESS NOTES
Visit Date: 2022    FOLLOWUP EXAMINATION  She is now 13 days status post ORIF of a left Lee C ankle fracture.  She is doing really well at this point.  She has absolutely no problems with her ankle.  She has not been walking on it.  She has been doing very, very good job of protecting her ankle.  She comes out of the splint today.  Staples were removed.  Steri-Strips were applied.  Her ankle appears completely benign.    IMAGING:  X-ray examination of the ankle performed today shows a plate with 6 screws and TightRopes in good position across the syndesmosis.    IMPRESSION AND PLAN:  A 36-year-old female status post ORIF of a Lee C ankle fracture.  I think she is going to do great at this point, she has been protecting it very well.  We are going to continue to watch this and she is going to be nonweightbearing.  We put her in a Cam walker boot today just so that she can remove it and replace it, but she is to remain nonweightbearing on this for the time being.  She understands that. This is going to be a long time based upon the syndesmosis injury.  I am going to see her back in approximately 3-4 weeks.    Eddy Morillo MD        D: 2022   T: 2022   MT: NILES    Name:     BOO MEZA  MRN:      [id number]        Account:    [id number]   :      1985           Visit Date: 2022     Document: [id number]

## 2022-05-20 DIAGNOSIS — Z87.81 S/P ORIF (OPEN REDUCTION INTERNAL FIXATION) FRACTURE: Primary | ICD-10-CM

## 2022-05-20 DIAGNOSIS — Z98.890 S/P ORIF (OPEN REDUCTION INTERNAL FIXATION) FRACTURE: Primary | ICD-10-CM

## 2022-05-23 ENCOUNTER — OFFICE VISIT (OUTPATIENT)
Dept: ORTHOPEDICS | Facility: OTHER | Age: 37
End: 2022-05-23
Attending: ORTHOPAEDIC SURGERY
Payer: COMMERCIAL

## 2022-05-23 ENCOUNTER — HOSPITAL ENCOUNTER (OUTPATIENT)
Dept: GENERAL RADIOLOGY | Facility: OTHER | Age: 37
Discharge: HOME | End: 2022-05-23
Attending: ORTHOPAEDIC SURGERY
Payer: COMMERCIAL

## 2022-05-23 DIAGNOSIS — Z87.81 S/P ORIF (OPEN REDUCTION INTERNAL FIXATION) FRACTURE: ICD-10-CM

## 2022-05-23 DIAGNOSIS — Z98.890 S/P ORIF (OPEN REDUCTION INTERNAL FIXATION) FRACTURE: Primary | ICD-10-CM

## 2022-05-23 DIAGNOSIS — Z98.890 S/P ORIF (OPEN REDUCTION INTERNAL FIXATION) FRACTURE: ICD-10-CM

## 2022-05-23 DIAGNOSIS — Z87.81 S/P ORIF (OPEN REDUCTION INTERNAL FIXATION) FRACTURE: Primary | ICD-10-CM

## 2022-05-23 PROCEDURE — G0463 HOSPITAL OUTPT CLINIC VISIT: HCPCS

## 2022-05-23 PROCEDURE — G0463 HOSPITAL OUTPT CLINIC VISIT: HCPCS | Mod: 25

## 2022-05-23 PROCEDURE — 99024 POSTOP FOLLOW-UP VISIT: CPT | Performed by: ORTHOPAEDIC SURGERY

## 2022-05-23 PROCEDURE — 73610 X-RAY EXAM OF ANKLE: CPT | Mod: LT

## 2022-05-23 NOTE — NURSING NOTE
"Chief Complaint   Patient presents with     Left Ankle - Post-op Visit     ORIF of a left Lee C ankle fracture     Patient is here for post op ORIF of a left Lee C ankle fracture s/p 5 weeks.     Initial There were no vitals taken for this visit. Estimated body mass index is 32.12 kg/m  as calculated from the following:    Height as of 4/28/22: 1.549 m (5' 1\").    Weight as of 4/28/22: 77.1 kg (170 lb).       Medication Reconciliation: Complete    Rosa Santillan LPN .......  5/23/2022  2:05 PM   "

## 2022-05-23 NOTE — PROGRESS NOTES
Subjective:    36-year-old female status post Lee C ankle fracture ORIF.  Minimal complaints with the ankle pain is well controlled.    Objective:    Ankle is completely benign.  Wound is healing nicely.  She has some mild swelling.  No signs of infection.  Alignment is excellent.    Assessment:    36-year-old female status post open reduction internal fixation doing well.    Plan:    We will see her back in approximately 5 weeks.  She is to start physical therapy.

## 2022-11-23 PROCEDURE — 99283 EMERGENCY DEPT VISIT LOW MDM: CPT | Performed by: FAMILY MEDICINE

## 2022-11-24 ENCOUNTER — HOSPITAL ENCOUNTER (EMERGENCY)
Facility: OTHER | Age: 37
Discharge: HOME | End: 2022-11-24
Attending: FAMILY MEDICINE | Admitting: FAMILY MEDICINE

## 2022-11-24 VITALS
RESPIRATION RATE: 16 BRPM | DIASTOLIC BLOOD PRESSURE: 99 MMHG | TEMPERATURE: 97.7 F | BODY MASS INDEX: 32.1 KG/M2 | HEIGHT: 61 IN | SYSTOLIC BLOOD PRESSURE: 166 MMHG | OXYGEN SATURATION: 97 % | WEIGHT: 170 LBS | HEART RATE: 84 BPM

## 2022-11-24 DIAGNOSIS — H18.893 CORNEAL IRRITATION OF BOTH EYES: ICD-10-CM

## 2022-11-24 PROCEDURE — 250N000009 HC RX 250: Performed by: FAMILY MEDICINE

## 2022-11-24 PROCEDURE — 250N000013 HC RX MED GY IP 250 OP 250 PS 637: Performed by: FAMILY MEDICINE

## 2022-11-24 RX ORDER — HYDROCODONE BITARTRATE AND ACETAMINOPHEN 5; 325 MG/1; MG/1
1 TABLET ORAL ONCE
Status: COMPLETED | OUTPATIENT
Start: 2022-11-24 | End: 2022-11-24

## 2022-11-24 RX ORDER — HYDROCODONE BITARTRATE AND ACETAMINOPHEN 5; 325 MG/1; MG/1
1 TABLET ORAL EVERY 6 HOURS PRN
Qty: 6 TABLET | Refills: 0 | Status: SHIPPED | OUTPATIENT
Start: 2022-11-24

## 2022-11-24 RX ORDER — TETRACAINE HYDROCHLORIDE 5 MG/ML
1-2 SOLUTION OPHTHALMIC ONCE
Status: COMPLETED | OUTPATIENT
Start: 2022-11-24 | End: 2022-11-24

## 2022-11-24 RX ADMIN — HYDROCODONE BITARTRATE AND ACETAMINOPHEN 1 TABLET: 5; 325 TABLET ORAL at 00:47

## 2022-11-24 RX ADMIN — TETRACAINE HYDROCHLORIDE 1 DROP: 5 SOLUTION OPHTHALMIC at 00:34

## 2022-11-24 RX ADMIN — FLUORESCEIN SODIUM 1 STRIP: 1 STRIP OPHTHALMIC at 00:34

## 2022-11-24 ASSESSMENT — ENCOUNTER SYMPTOMS
EYE PAIN: 1
PHOTOPHOBIA: 1

## 2022-11-24 ASSESSMENT — ACTIVITIES OF DAILY LIVING (ADL): ADLS_ACUITY_SCORE: 35
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
## 2022-11-24 NOTE — DISCHARGE INSTRUCTIONS
Tatiana    Your exam shows no serious injury to the eyes but they will be very sore for a few days.  I will send you home with some Vicodin for pain. DO NOT DRIVE FOR SIX HOURS AFTER TAKING VICODIN.    Thank you for choosing our Emergency Department for your care.     You may receive a phone call or letter for a survey about your care in our ED.  Please complete this as this is how we improve care for our patients.     If you have any questions after leaving the ED you can call or text me on my cell phone at [phone] and I will get back to you at some point. This does not mean that I am on call and if you are not doing well please return to the ED.     Sincerely,    Dr Aldair Ren M.D.

## 2022-11-24 NOTE — LETTER
November 24, 2022      To Whom It May Concern:      Tatiana Garcia was seen in our Emergency Department today, 11/24/22.  I expect her condition to improve over the next few days.  She may return to work when improved.    Sincerely,              Vladimir Ren MD
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
## (undated) DEVICE — GOWN SURGICAL LEVEL 4 W/TOWEL XL AERO CHROME

## (undated) DEVICE — DRAPE C-ARM 3M 60X42

## (undated) DEVICE — BIT DRILL 2.0MM QC 140MM W/ DEPTH MARK

## (undated) DEVICE — DRESSING TEGADERM 4X4 3/4

## (undated) DEVICE — GLOVE SENSICARE 7.0 SURG

## (undated) DEVICE — SOL SOD CHL IRR .9% 1000ML BTL USP PLASTIC POUR BOTTLE

## (undated) DEVICE — GLOVE BIOGEL PI IND SURGICAL SZ 7.5

## (undated) DEVICE — K-WIRE ST 2.5MM 150MM

## (undated) DEVICE — SUTURE ETHILON 2-0 FS 18" BLACK MONOFILAMENT NON ABS

## (undated) DEVICE — TRAY TOTAL HIP CUSTOM RCRH CUSTOM PACK

## (undated) DEVICE — GLOVE SENSICARE 7.5 SURG

## (undated) DEVICE — GOWN SURGICAL XL LEVEL 4

## (undated) DEVICE — WIRE 1.25 PLATE REDUCT LG STOP

## (undated) DEVICE — SUTURE VICRYL 0 CT2 27" VIOLET

## (undated) DEVICE — SUTURE VICRYL 1 CT 36" VIOLET

## (undated) DEVICE — GLOVE SURG PROTEXIS SYNTH PI SZ 7

## (undated) DEVICE — IMPLANTABLE DEVICE
Type: IMPLANTABLE DEVICE | Site: SCAPULA | Status: NON-FUNCTIONAL
Removed: 2020-08-13

## (undated) DEVICE — TUBING SUCTION 3/16"X10'

## (undated) DEVICE — SUTURE VICRYL 2-0 CT-1 36" UNDYED

## (undated) DEVICE — GLOVE SURG PROTEXIS PI BLUE SZ 8.5 UNDERGLOVE

## (undated) DEVICE — GLOVE SENSICARE MICRO PF 6.0

## (undated) DEVICE — KIT ROOM TURNOVER STANDARD 01C INFECTION CONTROL SYSTEM

## (undated) DEVICE — PREP SKIN CHLORAPREP ORNG 26ML STL

## (undated) DEVICE — SPONGE GAUZE 12PLY 4X4 STL

## (undated) DEVICE — DRESSING ADAPTIC 3X8

## (undated) DEVICE — GLOVE SURG MICRO PROTEXIS SYNTH PI SZ 7

## (undated) DEVICE — DRAPE STERI U-DRAPE

## (undated) DEVICE — GLOVE 7 DERMAPRENE ULTRA POWDER FREE 8514

## (undated) DEVICE — GLOVE BIOGEL PI IND 7.0 SURGICAL

## (undated) DEVICE — SPONGE GAUZE 4X4-12 STL 10'S

## (undated) DEVICE — COVER LIGHT HANDLE STERIS

## (undated) DEVICE — SUTURE VICRYL 1 CT

## (undated) RX ORDER — TETRACAINE HYDROCHLORIDE 5 MG/ML
SOLUTION OPHTHALMIC
Status: DISPENSED
Start: 2022-11-24

## (undated) RX ORDER — OXYCODONE AND ACETAMINOPHEN 5; 325 MG/1; MG/1
TABLET ORAL
Status: DISPENSED
Start: 2022-01-27

## (undated) RX ORDER — ONDANSETRON 4 MG/1
TABLET, ORALLY DISINTEGRATING ORAL
Status: DISPENSED
Start: 2022-01-27

## (undated) RX ORDER — DEXAMETHASONE SODIUM PHOSPHATE 4 MG/ML
INJECTION, SOLUTION INTRA-ARTICULAR; INTRALESIONAL; INTRAMUSCULAR; INTRAVENOUS; SOFT TISSUE
Status: DISPENSED
Start: 2022-01-27

## (undated) RX ORDER — KETOROLAC TROMETHAMINE 15 MG/ML
INJECTION, SOLUTION INTRAMUSCULAR; INTRAVENOUS
Status: DISPENSED
Start: 2022-01-27

## (undated) RX ORDER — HYDROCODONE BITARTRATE AND ACETAMINOPHEN 5; 325 MG/1; MG/1
TABLET ORAL
Status: DISPENSED
Start: 2022-11-24

## (undated) RX ORDER — SODIUM CHLORIDE 9 MG/ML
INJECTION, SOLUTION INTRAVENOUS
Status: DISPENSED
Start: 2022-01-27

## (undated) RX ORDER — HYDROCODONE BITARTRATE AND ACETAMINOPHEN 5; 325 MG/1; MG/1
TABLET ORAL
Status: DISPENSED
Start: 2022-04-18

## (undated) RX ORDER — KETOROLAC TROMETHAMINE 30 MG/ML
INJECTION, SOLUTION INTRAMUSCULAR; INTRAVENOUS
Status: DISPENSED
Start: 2022-04-18